# Patient Record
Sex: FEMALE | Race: BLACK OR AFRICAN AMERICAN | NOT HISPANIC OR LATINO | URBAN - METROPOLITAN AREA
[De-identification: names, ages, dates, MRNs, and addresses within clinical notes are randomized per-mention and may not be internally consistent; named-entity substitution may affect disease eponyms.]

---

## 2021-09-14 ENCOUNTER — EMERGENCY (EMERGENCY)
Facility: HOSPITAL | Age: 26
LOS: 0 days | Discharge: HOME | End: 2021-09-14
Attending: STUDENT IN AN ORGANIZED HEALTH CARE EDUCATION/TRAINING PROGRAM | Admitting: STUDENT IN AN ORGANIZED HEALTH CARE EDUCATION/TRAINING PROGRAM
Payer: MEDICAID

## 2021-09-14 VITALS
OXYGEN SATURATION: 97 % | WEIGHT: 250 LBS | RESPIRATION RATE: 20 BRPM | SYSTOLIC BLOOD PRESSURE: 177 MMHG | TEMPERATURE: 98 F | DIASTOLIC BLOOD PRESSURE: 86 MMHG | HEART RATE: 64 BPM

## 2021-09-14 DIAGNOSIS — L98.9 DISORDER OF THE SKIN AND SUBCUTANEOUS TISSUE, UNSPECIFIED: ICD-10-CM

## 2021-09-14 DIAGNOSIS — Z72.89 OTHER PROBLEMS RELATED TO LIFESTYLE: ICD-10-CM

## 2021-09-14 DIAGNOSIS — L91.0 HYPERTROPHIC SCAR: ICD-10-CM

## 2021-09-14 PROCEDURE — 99284 EMERGENCY DEPT VISIT MOD MDM: CPT | Mod: 25

## 2021-09-14 PROCEDURE — 76882 US LMTD JT/FCL EVL NVASC XTR: CPT | Mod: 26

## 2021-09-14 NOTE — ED PROCEDURE NOTE - ATTENDING CONTRIBUTION TO CARE
I personally supervised the study. I reviewed the images and interpretation by the resident/ACP and have edited where appropriate.

## 2021-09-14 NOTE — ED PROVIDER NOTE - PATIENT PORTAL LINK FT
You can access the FollowMyHealth Patient Portal offered by [facility] by registering at the following website: [email or web address]. By joining [employer]’s FollowMyHealth portal, you will also be able to view your health information using other applications (apps) compatible with our system.

## 2021-09-14 NOTE — ED PROVIDER NOTE - ATTENDING CONTRIBUTION TO CARE
27 yo f, no pmh presents for L shin wound. pt scraped her knee 3 months ago on wooden picnic table. abrasion healed w/o infection. since then, pt developed raised lesion, slightly darker. no fluctuance/ tenderness/ discharge    vss  gen- NAD, aaox3  card-rrr  lungs-ctab, no wheezing or rhonchi  neuro- full str/sensation, cn ii-xii grossly intact, normal coordination and gait  L shin - 1inch round raised lesion, no fluctance/erythema/drainage    keloid, will ref to derm

## 2021-09-14 NOTE — ED PROVIDER NOTE - OBJECTIVE STATEMENT
The patient is a 26 year old -American female with no stated PMHx presenting with a chief complaint of left leg wound. About 3 months ago the patient scraped her left shin against a wooden picnic table and had an abrasion. The abrasion healed without ever developing pus or discharge, per patient, but there remain a small lesion to the area. The lesion has been non purulent and is not painful. No recent F/C. No similar wounds in the past.     SHx: non smoker. Occasional EtOH. No illict drug us.

## 2021-09-14 NOTE — ED PROVIDER NOTE - NSFOLLOWUPINSTRUCTIONS_ED_ALL_ED_FT
Rash    A rash is a change in the color of the skin. A rash can also change the way your skin feels. There are many different conditions and factors that can cause a rash, most of which are not dangerous. Make sure to follow up with your primary care physician or a dermatologist as instructed by your health care provider. You most likely have a keloid, which is a collection of scar tissue where you previously injured yourself.     SEEK IMMEDIATE MEDICAL CARE IF YOU HAVE ANY OF THE FOLLOWING SYMPTOMS: fever, blisters, a rash inside your mouth, vaginal or anal pain, or altered mental status.

## 2021-09-14 NOTE — ED PROVIDER NOTE - NS ED ROS FT
Constitutional:  No fever, chills, lethargy, or abnormal weight loss  Eyes:  No eye pain or visual changes  ENMT: No nasal discharge, no sore throat. No neck pain or stiffness  Cardiac:  No chest pain or palpitations  Respiratory:  No cough or respiratory distress  GI:  No nausea, vomiting, diarrhea or abdominal pain  :  No dysuria, frequency, or hematuria  MS:  No back or joint pain  Neuro:  No headache. No numbness, weakness, or tingling  Skin:  +per hpi  Except as documented in the HPI,  all other systems are negative

## 2021-09-14 NOTE — ED PROVIDER NOTE - CARE PROVIDER_API CALL
Jaziel Dash  DERMATOLOGY  [address] [place] [address], [address] [place] [address]  Phone: [phone]  Fax: [phone]  Follow Up Time: 1-3 Days

## 2021-09-14 NOTE — ED PROVIDER NOTE - PHYSICAL EXAMINATION
VITAL SIGNS: I have reviewed nursing notes and confirm.  CONSTITUTIONAL: Well-appearing, non-toxic, in NAD  SKIN: Warm dry, normal skin turgor. There is a 0.75cm raised dark keloid lesion to the left anterior shin that is NTTP without underlying flutuance or drainage.   HEAD: NCAT  EYES: No conjunctival injection, scleral anicteric  ENT: Moist mucous membranes, normal pharynx with no erythema or exudates  NECK: Supple; non tender. Full ROM. No cervical LAD  CARD: RRR, no murmurs, rubs or gallops  RESP: Clear to ausculation bilaterally.  No rales, rhonchi, or wheezing.  ABD: Soft, non-distended, non-tender, no rebound or guarding. No CVA tenderness  EXT: Full ROM, no bony tenderness, no pedal edema, no calf tenderness  NEURO: Normal motor, normal sensory. CN II-XII grossly intact. Cerebellar testing normal. Normal gait.  PSYCH: Cooperative, appropriate.

## 2022-02-17 ENCOUNTER — NON-APPOINTMENT (OUTPATIENT)
Age: 27
End: 2022-02-17

## 2022-02-17 ENCOUNTER — APPOINTMENT (OUTPATIENT)
Dept: OBGYN | Facility: CLINIC | Age: 27
End: 2022-02-17
Payer: MEDICAID

## 2022-02-17 PROCEDURE — 99214 OFFICE O/P EST MOD 30 MIN: CPT

## 2022-02-17 PROCEDURE — 76830 TRANSVAGINAL US NON-OB: CPT

## 2022-02-17 PROCEDURE — 87490 CHLMYD TRACH DNA DIR PROBE: CPT

## 2022-02-18 ENCOUNTER — LABORATORY RESULT (OUTPATIENT)
Age: 27
End: 2022-02-18

## 2022-03-03 ENCOUNTER — APPOINTMENT (OUTPATIENT)
Dept: OBGYN | Facility: CLINIC | Age: 27
End: 2022-03-03
Payer: MEDICAID

## 2022-03-03 PROCEDURE — 76817 TRANSVAGINAL US OBSTETRIC: CPT

## 2022-03-03 PROCEDURE — 0502F SUBSEQUENT PRENATAL CARE: CPT

## 2022-03-15 ENCOUNTER — APPOINTMENT (OUTPATIENT)
Dept: OBGYN | Facility: CLINIC | Age: 27
End: 2022-03-15
Payer: MEDICAID

## 2022-03-15 PROCEDURE — 99213 OFFICE O/P EST LOW 20 MIN: CPT

## 2022-03-18 NOTE — ED ADULT TRIAGE NOTE - TEMPERATURE IN CELSIUS (DEGREES C)
Patient is on phone very short of breath, does not feel water pill is helping. Was seen 2 weeks ago for bp   36.9

## 2022-03-26 ENCOUNTER — EMERGENCY (EMERGENCY)
Facility: HOSPITAL | Age: 27
LOS: 0 days | Discharge: HOME | End: 2022-03-26
Attending: EMERGENCY MEDICINE | Admitting: EMERGENCY MEDICINE
Payer: MEDICAID

## 2022-03-26 VITALS
DIASTOLIC BLOOD PRESSURE: 83 MMHG | RESPIRATION RATE: 16 BRPM | HEART RATE: 66 BPM | SYSTOLIC BLOOD PRESSURE: 124 MMHG | WEIGHT: 237 LBS | OXYGEN SATURATION: 99 % | TEMPERATURE: 97 F

## 2022-03-26 DIAGNOSIS — F17.200 NICOTINE DEPENDENCE, UNSPECIFIED, UNCOMPLICATED: ICD-10-CM

## 2022-03-26 DIAGNOSIS — O26.891 OTHER SPECIFIED PREGNANCY RELATED CONDITIONS, FIRST TRIMESTER: ICD-10-CM

## 2022-03-26 DIAGNOSIS — O23.41 UNSPECIFIED INFECTION OF URINARY TRACT IN PREGNANCY, FIRST TRIMESTER: ICD-10-CM

## 2022-03-26 DIAGNOSIS — F12.90 CANNABIS USE, UNSPECIFIED, UNCOMPLICATED: ICD-10-CM

## 2022-03-26 DIAGNOSIS — O99.331 SMOKING (TOBACCO) COMPLICATING PREGNANCY, FIRST TRIMESTER: ICD-10-CM

## 2022-03-26 DIAGNOSIS — O21.1 HYPEREMESIS GRAVIDARUM WITH METABOLIC DISTURBANCE: ICD-10-CM

## 2022-03-26 DIAGNOSIS — R10.13 EPIGASTRIC PAIN: ICD-10-CM

## 2022-03-26 DIAGNOSIS — Z3A.10 10 WEEKS GESTATION OF PREGNANCY: ICD-10-CM

## 2022-03-26 DIAGNOSIS — O21.9 VOMITING OF PREGNANCY, UNSPECIFIED: ICD-10-CM

## 2022-03-26 LAB
ALBUMIN SERPL ELPH-MCNC: 4.5 G/DL — SIGNIFICANT CHANGE UP (ref 3.5–5.2)
ALP SERPL-CCNC: 67 U/L — SIGNIFICANT CHANGE UP (ref 30–115)
ALT FLD-CCNC: 18 U/L — SIGNIFICANT CHANGE UP (ref 0–41)
ANION GAP SERPL CALC-SCNC: 19 MMOL/L — HIGH (ref 7–14)
APPEARANCE UR: ABNORMAL
APTT BLD: 29.1 SEC — SIGNIFICANT CHANGE UP (ref 27–39.2)
AST SERPL-CCNC: 17 U/L — SIGNIFICANT CHANGE UP (ref 0–41)
BACTERIA # UR AUTO: ABNORMAL
BASOPHILS # BLD AUTO: 0.02 K/UL — SIGNIFICANT CHANGE UP (ref 0–0.2)
BASOPHILS NFR BLD AUTO: 0.3 % — SIGNIFICANT CHANGE UP (ref 0–1)
BILIRUB SERPL-MCNC: 0.4 MG/DL — SIGNIFICANT CHANGE UP (ref 0.2–1.2)
BILIRUB UR-MCNC: ABNORMAL
BLD GP AB SCN SERPL QL: SIGNIFICANT CHANGE UP
BUN SERPL-MCNC: 6 MG/DL — LOW (ref 10–20)
CALCIUM SERPL-MCNC: 10.2 MG/DL — HIGH (ref 8.5–10.1)
CHLORIDE SERPL-SCNC: 99 MMOL/L — SIGNIFICANT CHANGE UP (ref 98–110)
CO2 SERPL-SCNC: 19 MMOL/L — SIGNIFICANT CHANGE UP (ref 17–32)
COLOR SPEC: YELLOW — SIGNIFICANT CHANGE UP
CREAT SERPL-MCNC: 0.6 MG/DL — LOW (ref 0.7–1.5)
DIFF PNL FLD: NEGATIVE — SIGNIFICANT CHANGE UP
EGFR: 126 ML/MIN/1.73M2 — SIGNIFICANT CHANGE UP
EOSINOPHIL # BLD AUTO: 0.03 K/UL — SIGNIFICANT CHANGE UP (ref 0–0.7)
EOSINOPHIL NFR BLD AUTO: 0.5 % — SIGNIFICANT CHANGE UP (ref 0–8)
EPI CELLS # UR: >27 /HPF — HIGH (ref 0–5)
GLUCOSE SERPL-MCNC: 89 MG/DL — SIGNIFICANT CHANGE UP (ref 70–99)
GLUCOSE UR QL: SIGNIFICANT CHANGE UP
HCG SERPL-ACNC: HIGH MIU/ML
HCT VFR BLD CALC: 39.1 % — SIGNIFICANT CHANGE UP (ref 37–47)
HGB BLD-MCNC: 13.9 G/DL — SIGNIFICANT CHANGE UP (ref 12–16)
HYALINE CASTS # UR AUTO: 14 /LPF — HIGH (ref 0–7)
IMM GRANULOCYTES NFR BLD AUTO: 0.2 % — SIGNIFICANT CHANGE UP (ref 0.1–0.3)
INR BLD: 1.22 RATIO — SIGNIFICANT CHANGE UP (ref 0.65–1.3)
KETONES UR-MCNC: ABNORMAL
LEUKOCYTE ESTERASE UR-ACNC: NEGATIVE — SIGNIFICANT CHANGE UP
LIDOCAIN IGE QN: 27 U/L — SIGNIFICANT CHANGE UP (ref 7–60)
LYMPHOCYTES # BLD AUTO: 1.7 K/UL — SIGNIFICANT CHANGE UP (ref 1.2–3.4)
LYMPHOCYTES # BLD AUTO: 29.2 % — SIGNIFICANT CHANGE UP (ref 20.5–51.1)
MCHC RBC-ENTMCNC: 29 PG — SIGNIFICANT CHANGE UP (ref 27–31)
MCHC RBC-ENTMCNC: 35.5 G/DL — SIGNIFICANT CHANGE UP (ref 32–37)
MCV RBC AUTO: 81.6 FL — SIGNIFICANT CHANGE UP (ref 81–99)
MONOCYTES # BLD AUTO: 0.51 K/UL — SIGNIFICANT CHANGE UP (ref 0.1–0.6)
MONOCYTES NFR BLD AUTO: 8.7 % — SIGNIFICANT CHANGE UP (ref 1.7–9.3)
NEUTROPHILS # BLD AUTO: 3.56 K/UL — SIGNIFICANT CHANGE UP (ref 1.4–6.5)
NEUTROPHILS NFR BLD AUTO: 61.1 % — SIGNIFICANT CHANGE UP (ref 42.2–75.2)
NITRITE UR-MCNC: NEGATIVE — SIGNIFICANT CHANGE UP
NRBC # BLD: 0 /100 WBCS — SIGNIFICANT CHANGE UP (ref 0–0)
PH UR: 6 — SIGNIFICANT CHANGE UP (ref 5–8)
PLATELET # BLD AUTO: 248 K/UL — SIGNIFICANT CHANGE UP (ref 130–400)
POTASSIUM SERPL-MCNC: 3.7 MMOL/L — SIGNIFICANT CHANGE UP (ref 3.5–5)
POTASSIUM SERPL-SCNC: 3.7 MMOL/L — SIGNIFICANT CHANGE UP (ref 3.5–5)
PROT SERPL-MCNC: 7.5 G/DL — SIGNIFICANT CHANGE UP (ref 6–8)
PROT UR-MCNC: ABNORMAL
PROTHROM AB SERPL-ACNC: 14 SEC — HIGH (ref 9.95–12.87)
RBC # BLD: 4.79 M/UL — SIGNIFICANT CHANGE UP (ref 4.2–5.4)
RBC # FLD: 11.9 % — SIGNIFICANT CHANGE UP (ref 11.5–14.5)
RBC CASTS # UR COMP ASSIST: 0 /HPF — SIGNIFICANT CHANGE UP (ref 0–4)
SODIUM SERPL-SCNC: 137 MMOL/L — SIGNIFICANT CHANGE UP (ref 135–146)
SP GR SPEC: 1.04 — HIGH (ref 1.01–1.03)
UROBILINOGEN FLD QL: ABNORMAL
WBC # BLD: 5.83 K/UL — SIGNIFICANT CHANGE UP (ref 4.8–10.8)
WBC # FLD AUTO: 5.83 K/UL — SIGNIFICANT CHANGE UP (ref 4.8–10.8)
WBC UR QL: 9 /HPF — HIGH (ref 0–5)

## 2022-03-26 PROCEDURE — 76815 OB US LIMITED FETUS(S): CPT | Mod: 26

## 2022-03-26 PROCEDURE — 76856 US EXAM PELVIC COMPLETE: CPT | Mod: 26

## 2022-03-26 PROCEDURE — 99285 EMERGENCY DEPT VISIT HI MDM: CPT

## 2022-03-26 RX ORDER — NITROFURANTOIN MACROCRYSTAL 50 MG
1 CAPSULE ORAL
Qty: 14 | Refills: 0
Start: 2022-03-26 | End: 2022-04-01

## 2022-03-26 RX ORDER — SODIUM CHLORIDE 9 MG/ML
1000 INJECTION INTRAMUSCULAR; INTRAVENOUS; SUBCUTANEOUS ONCE
Refills: 0 | Status: COMPLETED | OUTPATIENT
Start: 2022-03-26 | End: 2022-03-26

## 2022-03-26 RX ORDER — FAMOTIDINE 10 MG/ML
20 INJECTION INTRAVENOUS DAILY
Refills: 0 | Status: DISCONTINUED | OUTPATIENT
Start: 2022-03-26 | End: 2022-03-26

## 2022-03-26 RX ORDER — ONDANSETRON 8 MG/1
4 TABLET, FILM COATED ORAL ONCE
Refills: 0 | Status: COMPLETED | OUTPATIENT
Start: 2022-03-26 | End: 2022-03-26

## 2022-03-26 RX ADMIN — ONDANSETRON 4 MILLIGRAM(S): 8 TABLET, FILM COATED ORAL at 08:28

## 2022-03-26 RX ADMIN — FAMOTIDINE 104 MILLIGRAM(S): 10 INJECTION INTRAVENOUS at 08:28

## 2022-03-26 RX ADMIN — SODIUM CHLORIDE 1000 MILLILITER(S): 9 INJECTION INTRAMUSCULAR; INTRAVENOUS; SUBCUTANEOUS at 08:28

## 2022-03-26 NOTE — ED PROVIDER NOTE - PATIENT PORTAL LINK FT
You can access the FollowMyHealth Patient Portal offered by [facility] by registering at the following website: [email or web address]. By joining [employer]’s FollowMyHealth portal, you will also be able to view your health information using other applications (apps) compatible with our system.

## 2022-03-26 NOTE — ED PROVIDER NOTE - CLINICAL SUMMARY MEDICAL DECISION MAKING FREE TEXT BOX
pt tolerating po water and jello in ED, feels much better, dc home w macrobid for uti, already has script for antiemetics from her OB, f/u OB 1-2 weeks, strict return precautions provided

## 2022-03-26 NOTE — ED PROVIDER NOTE - PROGRESS NOTE DETAILS
Patient tolerated PO. She will fill rx for antiemetic that was prescribed for her by her OB. Will treat with Macrobid for UTI

## 2022-03-26 NOTE — ED PROVIDER NOTE - NS ED ROS FT
Review of Systems    Constitutional: (-) fever, (-) chills  Eyes/ENT: (-) blurry vision, (-) epistaxis, (-) sore throat  Cardiovascular: (-) chest pain, (-) syncope  Respiratory: (-) cough, (-) shortness of breath  Gastrointestinal: (+)epigastric burning, (+) nausea, (+) vomiting, (-) diarrhea  Musculoskeletal: (-) neck pain, (-) back pain, (-) body aches  Integumentary: (-) rash, (-) edema  Neurological: (-) headache, (-) altered mental status  Psychiatric: (-) hallucinations  Allergic/Immunologic: (-) pruritus

## 2022-03-26 NOTE — ED PROVIDER NOTE - NS ED ATTENDING STATEMENT MOD
This was a shared visit with the CHRIS. I reviewed and verified the documentation and independently performed the documented:

## 2022-03-26 NOTE — ED PROVIDER NOTE - OBJECTIVE STATEMENT
27 yo F , LMP unknown, 10 weeks pregnant c/o increased vomiting x 3days. + epigastric burning.  No abdominal pains. No f/c/c/d. No vaginal bleeding. No urinary complaints. 27 yo F , LMP unknown, 10 weeks pregnant c/o increased vomiting x 3days. + epigastric burning.  No abdominal pains. No f/c/c/d. No vaginal bleeding. No urinary complaints. +uses Marijuana

## 2022-03-26 NOTE — ED PROVIDER NOTE - ATTENDING CONTRIBUTION TO CARE
26-year-old female no past medical history/past surgical history, G3, P0, approximately 10 weeks pregnant, presents with 3 days of intractable nonbilious nonbloody emesis.  Loose stools associated.  Epigastric burning that radiates to her chest and throat associated.  No fever.  No dysuria frequency hematuria.  No vaginal bleeding or discharge.  States she has had an ultrasound with this pregnancy and it was normal.  OB/GYN Dr. Lee, has appointment in 1 week.  Patient's mother at the bedside.    On exam, AFVSS, Well appearing, No acute distress, NCAT, EOMI, PERRLA, dry MM, Neck supple, LCTAB, RRR nl s1s2 No mrg, Abdomen Soft NTND, AAOx3, No Focal Deficits, No LE edema or calf TTP, actively vomiting in the ED    A/P; hyperemesis gravidarum–mild dehydration–we will do labs UA sono IV fluid Pepcid Zofran and reassess p.o. trial

## 2022-03-28 LAB
CULTURE RESULTS: SIGNIFICANT CHANGE UP
SPECIMEN SOURCE: SIGNIFICANT CHANGE UP

## 2022-04-05 ENCOUNTER — APPOINTMENT (OUTPATIENT)
Dept: OBGYN | Facility: CLINIC | Age: 27
End: 2022-04-05
Payer: MEDICAID

## 2022-04-05 PROCEDURE — 99213 OFFICE O/P EST LOW 20 MIN: CPT

## 2022-04-07 ENCOUNTER — OUTPATIENT (OUTPATIENT)
Dept: OUTPATIENT SERVICES | Facility: HOSPITAL | Age: 27
LOS: 1 days | Discharge: HOME | End: 2022-04-07

## 2022-04-07 ENCOUNTER — APPOINTMENT (OUTPATIENT)
Dept: ANTEPARTUM | Facility: CLINIC | Age: 27
End: 2022-04-07
Payer: MEDICAID

## 2022-04-07 ENCOUNTER — ASOB RESULT (OUTPATIENT)
Age: 27
End: 2022-04-07

## 2022-04-07 PROCEDURE — 76813 OB US NUCHAL MEAS 1 GEST: CPT | Mod: 26

## 2022-04-12 ENCOUNTER — APPOINTMENT (OUTPATIENT)
Dept: OBGYN | Facility: CLINIC | Age: 27
End: 2022-04-12
Payer: MEDICAID

## 2022-04-12 PROCEDURE — 99213 OFFICE O/P EST LOW 20 MIN: CPT

## 2022-05-05 ENCOUNTER — APPOINTMENT (OUTPATIENT)
Dept: OBGYN | Facility: CLINIC | Age: 27
End: 2022-05-05
Payer: MEDICAID

## 2022-05-05 PROCEDURE — 99213 OFFICE O/P EST LOW 20 MIN: CPT

## 2022-05-05 PROCEDURE — 76815 OB US LIMITED FETUS(S): CPT

## 2022-06-01 ENCOUNTER — APPOINTMENT (OUTPATIENT)
Dept: ANTEPARTUM | Facility: CLINIC | Age: 27
End: 2022-06-01
Payer: MEDICAID

## 2022-06-01 ENCOUNTER — ASOB RESULT (OUTPATIENT)
Age: 27
End: 2022-06-01

## 2022-06-01 ENCOUNTER — OUTPATIENT (OUTPATIENT)
Dept: OUTPATIENT SERVICES | Facility: HOSPITAL | Age: 27
LOS: 1 days | Discharge: HOME | End: 2022-06-01

## 2022-06-01 PROCEDURE — 76817 TRANSVAGINAL US OBSTETRIC: CPT | Mod: 26

## 2022-06-01 PROCEDURE — 76811 OB US DETAILED SNGL FETUS: CPT | Mod: 26

## 2022-06-06 DIAGNOSIS — Z36.86 ENCOUNTER FOR ANTENATAL SCREENING FOR CERVICAL LENGTH: ICD-10-CM

## 2022-06-06 DIAGNOSIS — O99.210 OBESITY COMPLICATING PREGNANCY, UNSPECIFIED TRIMESTER: ICD-10-CM

## 2022-06-06 DIAGNOSIS — Z36.3 ENCOUNTER FOR ANTENATAL SCREENING FOR MALFORMATIONS: ICD-10-CM

## 2022-06-07 ENCOUNTER — APPOINTMENT (OUTPATIENT)
Dept: OBGYN | Facility: CLINIC | Age: 27
End: 2022-06-07
Payer: MEDICAID

## 2022-06-07 PROCEDURE — 99213 OFFICE O/P EST LOW 20 MIN: CPT

## 2022-06-22 ENCOUNTER — ASOB RESULT (OUTPATIENT)
Age: 27
End: 2022-06-22

## 2022-06-22 ENCOUNTER — OUTPATIENT (OUTPATIENT)
Dept: OUTPATIENT SERVICES | Facility: HOSPITAL | Age: 27
LOS: 1 days | Discharge: HOME | End: 2022-06-22

## 2022-06-22 ENCOUNTER — APPOINTMENT (OUTPATIENT)
Dept: ANTEPARTUM | Facility: CLINIC | Age: 27
End: 2022-06-22
Payer: MEDICAID

## 2022-06-22 DIAGNOSIS — O99.210 OBESITY COMPLICATING PREGNANCY, UNSPECIFIED TRIMESTER: ICD-10-CM

## 2022-06-22 DIAGNOSIS — Z36.3 ENCOUNTER FOR ANTENATAL SCREENING FOR MALFORMATIONS: ICD-10-CM

## 2022-06-22 DIAGNOSIS — Z3A.23 23 WEEKS GESTATION OF PREGNANCY: ICD-10-CM

## 2022-06-22 PROCEDURE — 76815 OB US LIMITED FETUS(S): CPT | Mod: 26

## 2022-06-28 ENCOUNTER — APPOINTMENT (OUTPATIENT)
Dept: OBGYN | Facility: CLINIC | Age: 27
End: 2022-06-28

## 2022-06-28 PROCEDURE — 76811 OB US DETAILED SNGL FETUS: CPT

## 2022-06-28 PROCEDURE — 99213 OFFICE O/P EST LOW 20 MIN: CPT

## 2022-07-14 ENCOUNTER — APPOINTMENT (OUTPATIENT)
Dept: OBGYN | Facility: CLINIC | Age: 27
End: 2022-07-14

## 2022-07-21 ENCOUNTER — NON-APPOINTMENT (OUTPATIENT)
Age: 27
End: 2022-07-21

## 2022-07-21 DIAGNOSIS — Z3A.27 27 WEEKS GESTATION OF PREGNANCY: ICD-10-CM

## 2022-07-28 ENCOUNTER — APPOINTMENT (OUTPATIENT)
Dept: OBGYN | Facility: CLINIC | Age: 27
End: 2022-07-28

## 2022-07-28 PROCEDURE — 99213 OFFICE O/P EST LOW 20 MIN: CPT

## 2022-08-16 ENCOUNTER — APPOINTMENT (OUTPATIENT)
Dept: OBGYN | Facility: CLINIC | Age: 27
End: 2022-08-16

## 2022-08-16 PROCEDURE — 99213 OFFICE O/P EST LOW 20 MIN: CPT

## 2022-08-22 ENCOUNTER — OUTPATIENT (OUTPATIENT)
Dept: EMERGENCY DEPT | Facility: HOSPITAL | Age: 27
LOS: 1 days | Discharge: HOME | End: 2022-08-22

## 2022-08-22 VITALS
SYSTOLIC BLOOD PRESSURE: 114 MMHG | WEIGHT: 235.89 LBS | OXYGEN SATURATION: 99 % | DIASTOLIC BLOOD PRESSURE: 85 MMHG | TEMPERATURE: 99 F | HEART RATE: 107 BPM | RESPIRATION RATE: 18 BRPM

## 2022-08-22 PROCEDURE — 99284 EMERGENCY DEPT VISIT MOD MDM: CPT

## 2022-08-22 RX ORDER — ACETAMINOPHEN 500 MG
650 TABLET ORAL ONCE
Refills: 0 | Status: COMPLETED | OUTPATIENT
Start: 2022-08-22 | End: 2022-08-22

## 2022-08-22 RX ORDER — GLYCERIN ADULT
1 SUPPOSITORY, RECTAL RECTAL ONCE
Refills: 0 | Status: COMPLETED | OUTPATIENT
Start: 2022-08-22 | End: 2022-08-22

## 2022-08-22 RX ADMIN — Medication 1 SUPPOSITORY(S): at 23:51

## 2022-08-22 RX ADMIN — Medication 650 MILLIGRAM(S): at 23:19

## 2022-08-22 NOTE — ED PROVIDER NOTE - TOBACCO USE
was treated with adjuvant radiation therapy and adjuvant endocrine therapy with Anastrozole which she initiated in January 2020. I reassured her that based on her clinical examination and most recent breast imaging that there is no evidence of any recurrent disease or new abnormalities. We reviewed the importance of continued endocrine therapy. Signs and symptoms of recurrence were reviewed. She verbalizes understanding that she should notify our office if she identifies any abnormalities on self examination as it may require further workup. I would like to see her back in 3 months for a clinical exam and bilateral mammograms. In the interim, I encouraged her to resume self examinations on a monthly basis and to alert me of any changes. I also encouraged her to eat healthy, stay active, and limit alcohol intake for risk reduction purposes. I answered all of her questions thoroughly, and she does seem pleased with this plan of approach. I encouraged her to contact me in the interim if any new questions or concerns arise.     Summary:  - f/u in 3 months with bilateral mammograms  - continue endocrine therapy per medical oncology recommendations    Moe Singh MD Former smoker

## 2022-08-22 NOTE — ED PROVIDER NOTE - ATTENDING CONTRIBUTION TO CARE
28 yo f , ~7.5 months pregnant, OBGYN- Dr. Lee  pt presents for eval of constipation/bloating. last bm 4 days ago. pt still passing gas.  no previous abd surgeries.    vss  gen- NAD, aaox3  card-rrr  lungs-ctab, no wheezing or rhonchi  abd-sntnd, no guarding or rebound  neuro- full str/sensation, cn ii-xii grossly intact, normal coordination and gait    will give suppository, FHR

## 2022-08-22 NOTE — ED ADULT TRIAGE NOTE - CHIEF COMPLAINT QUOTE
Pt c/o anal pain. Pt states she is constipated x 4 days and is having a lot of rectal pain. Pt 7.5 months pregnant

## 2022-08-22 NOTE — ED PROVIDER NOTE - NS ED ROS FT
Review of Systems    Constitutional: (-) fever  Cardiovascular: (-) chest pain, (-) syncope  Respiratory: (-) cough, (-) shortness of breath  Gastrointestinal: (-) vomiting, (-) diarrhea, (+) abdominal pain (+) constipation  Musculoskeletal: (-) neck pain, (-) back pain, (-) joint pain  Integumentary: (-) rash, (-) edema  Neurological: (-) headache, (-) altered mental status    Except as documented in the HPI, all other systems are negative.

## 2022-08-22 NOTE — ED ADULT NURSE NOTE - OBJECTIVE STATEMENT
Pt c/o anal pain. Pt states she is constipated x 4 days and is having a lot of rectal pain. Pt 7.5 months pregnant AOx4 Fall Risk

## 2022-08-22 NOTE — ED PROVIDER NOTE - PHYSICAL EXAMINATION
Vital Signs: I have reviewed the initial vital signs.  Constitutional: well-nourished, appears stated age, no acute distress.  HEENT: Airway patent, moist MM, EOMI, PERRLA.  CV: regular rate, regular rhythm, well-perfused extremities,   Lungs: Clear to ascultation bilaterally, no increased work of breathing.  ABD: gravid uterus, non-tender  MSK: Neck supple, nontender, normal range of motion,  INTEG: Skin warm, dry, no rash.  NEURO: A&Ox3, moving all extremities, normal speech  PSYCH: Calm, cooperative, normal affect and interaction.

## 2022-08-22 NOTE — ED PROVIDER NOTE - OBJECTIVE STATEMENT
Patient is a 27y F 7.5 months pregnant Patient of Dr. Lee presenting for abdominal pain and constipation. Last BM was 4 days ago. Patient was started on stool softeners by OB (colase) but hasn't been helping. Still passing gas.

## 2022-08-23 VITALS — DIASTOLIC BLOOD PRESSURE: 84 MMHG | SYSTOLIC BLOOD PRESSURE: 135 MMHG | HEART RATE: 71 BPM

## 2022-08-23 DIAGNOSIS — O26.893 OTHER SPECIFIED PREGNANCY RELATED CONDITIONS, THIRD TRIMESTER: ICD-10-CM

## 2022-08-23 DIAGNOSIS — K59.00 CONSTIPATION, UNSPECIFIED: ICD-10-CM

## 2022-08-23 RX ADMIN — Medication 10 MILLIGRAM(S): at 01:23

## 2022-08-23 NOTE — OB PROVIDER TRIAGE NOTE - HISTORY OF PRESENT ILLNESS
26 y/o  at 32w0d, EDD1, dated by LMP c/w first trimester sonogram, presents for constipation. Reports taking stool softener, last bowel movement was 4 days ago. Reports intermittent rectal pressure. Reports vomiting since the beginning of pregnancy. Denies nausea. Reports passing flatus. Denies abdominal pain, LOF, vaginal bleeding, dyschezia, hematuria or dysuria. Reports good fetal movement

## 2022-08-23 NOTE — OB PROVIDER TRIAGE NOTE - NSHPPHYSICALEXAM_GEN_ALL_CORE
Vital Signs Last 24 Hrs  T(C): 37.1 (23 Aug 2022 00:41), Max: 37.1 (23 Aug 2022 00:41)  T(F): 98.7 (23 Aug 2022 00:41), Max: 98.7 (23 Aug 2022 00:41)  HR: 71 (23 Aug 2022 00:45) (71 - 107)  BP: 135/84 (23 Aug 2022 00:45) (114/85 - 135/84)  RR: 18 (23 Aug 2022 00:41) (18 - 18)  SpO2: 99% (22 Aug 2022 22:03) (99% - 99%)    Parameters below as of 22 Aug 2022 22:03  Patient On (Oxygen Delivery Method): room air    GA: AAOx3 in NAD  abd: gravid, nontender, no palpable contractions  EFM: 155/mod variability/accels +  toco: no contractions  SVE: deferred  BSS: vertex, MVP 3cm, ant placenta, BPP 8/8

## 2022-08-23 NOTE — OB PROVIDER TRIAGE NOTE - NS_OBGYNHISTORY_OBGYN_ALL_OB_FT
OB: eTOPx2 with D&Cx2    GYN:   h/o trichomonas and chlamydia earlier in this pregnancy, s/p treatment, neg CASTILLO per patient  denies h/o fibroids, cysts, abnormal paps, or STI's

## 2022-08-23 NOTE — OB RN TRIAGE NOTE - FALL HARM RISK - UNIVERSAL INTERVENTIONS
Bed in lowest position, wheels locked, appropriate side rails in place/Call bell, personal items and telephone in reach/Instruct patient to call for assistance before getting out of bed or chair/Non-slip footwear when patient is out of bed/[place] to call system/Physically safe environment - no spills, clutter or unnecessary equipment/Purposeful Proactive Rounding/Room/bathroom lighting operational, light cord in reach

## 2022-08-23 NOTE — OB PROVIDER TRIAGE NOTE - NSOBPROVIDERNOTE_OBGYN_ALL_OB_FT
26 y/o  at 32w0d, with rectal pressure, likely secondary to constipation, s/p glycerin suppository, BPP 10/10 with reassuring maternal and fetal status  - discharge with PTL precautions  - advised to increased fiber and water intake in diet  - dulcolax suppository sent to pharmacy  - encourage to f/u with PMD as scheduled     Dr. Lee aware

## 2022-09-06 ENCOUNTER — APPOINTMENT (OUTPATIENT)
Dept: ANTEPARTUM | Facility: CLINIC | Age: 27
End: 2022-09-06

## 2022-09-06 ENCOUNTER — APPOINTMENT (OUTPATIENT)
Dept: OBGYN | Facility: CLINIC | Age: 27
End: 2022-09-06

## 2022-09-06 VITALS
DIASTOLIC BLOOD PRESSURE: 74 MMHG | HEIGHT: 62 IN | SYSTOLIC BLOOD PRESSURE: 120 MMHG | BODY MASS INDEX: 46.01 KG/M2 | WEIGHT: 250 LBS

## 2022-09-06 PROCEDURE — 76819 FETAL BIOPHYS PROFIL W/O NST: CPT

## 2022-09-06 PROCEDURE — 99213 OFFICE O/P EST LOW 20 MIN: CPT

## 2022-09-06 PROCEDURE — 76815 OB US LIMITED FETUS(S): CPT

## 2022-09-13 ENCOUNTER — APPOINTMENT (OUTPATIENT)
Dept: ANTEPARTUM | Facility: CLINIC | Age: 27
End: 2022-09-13

## 2022-09-13 ENCOUNTER — APPOINTMENT (OUTPATIENT)
Dept: OBGYN | Facility: CLINIC | Age: 27
End: 2022-09-13

## 2022-09-13 VITALS
WEIGHT: 260 LBS | DIASTOLIC BLOOD PRESSURE: 85 MMHG | HEIGHT: 62 IN | SYSTOLIC BLOOD PRESSURE: 120 MMHG | BODY MASS INDEX: 47.84 KG/M2

## 2022-09-13 PROCEDURE — 99213 OFFICE O/P EST LOW 20 MIN: CPT

## 2022-09-16 ENCOUNTER — NON-APPOINTMENT (OUTPATIENT)
Age: 27
End: 2022-09-16

## 2022-09-20 ENCOUNTER — APPOINTMENT (OUTPATIENT)
Dept: OBGYN | Facility: CLINIC | Age: 27
End: 2022-09-20

## 2022-09-20 ENCOUNTER — APPOINTMENT (OUTPATIENT)
Dept: ANTEPARTUM | Facility: CLINIC | Age: 27
End: 2022-09-20

## 2022-09-28 ENCOUNTER — APPOINTMENT (OUTPATIENT)
Dept: ANTEPARTUM | Facility: CLINIC | Age: 27
End: 2022-09-28

## 2022-10-03 ENCOUNTER — RESULT REVIEW (OUTPATIENT)
Age: 27
End: 2022-10-03

## 2022-10-03 ENCOUNTER — TRANSCRIPTION ENCOUNTER (OUTPATIENT)
Age: 27
End: 2022-10-03

## 2022-10-03 ENCOUNTER — INPATIENT (INPATIENT)
Facility: HOSPITAL | Age: 27
LOS: 4 days | Discharge: HOME | End: 2022-10-08
Attending: OBSTETRICS & GYNECOLOGY | Admitting: OBSTETRICS & GYNECOLOGY

## 2022-10-03 ENCOUNTER — APPOINTMENT (OUTPATIENT)
Dept: OBGYN | Facility: CLINIC | Age: 27
End: 2022-10-03

## 2022-10-03 VITALS — TEMPERATURE: 99 F

## 2022-10-03 DIAGNOSIS — Z98.890 OTHER SPECIFIED POSTPROCEDURAL STATES: Chronic | ICD-10-CM

## 2022-10-03 LAB
ALBUMIN SERPL ELPH-MCNC: 2.5 G/DL — LOW (ref 3.5–5.2)
ALBUMIN SERPL ELPH-MCNC: 2.9 G/DL — LOW (ref 3.5–5.2)
ALBUMIN SERPL ELPH-MCNC: 3 G/DL — LOW (ref 3.5–5.2)
ALBUMIN SERPL ELPH-MCNC: 3 G/DL — LOW (ref 3.5–5.2)
ALP SERPL-CCNC: 232 U/L — HIGH (ref 30–115)
ALP SERPL-CCNC: 294 U/L — HIGH (ref 30–115)
ALP SERPL-CCNC: 296 U/L — HIGH (ref 30–115)
ALP SERPL-CCNC: 303 U/L — HIGH (ref 30–115)
ALT FLD-CCNC: 10 U/L — SIGNIFICANT CHANGE UP (ref 0–41)
ALT FLD-CCNC: 11 U/L — SIGNIFICANT CHANGE UP (ref 0–41)
ALT FLD-CCNC: 11 U/L — SIGNIFICANT CHANGE UP (ref 0–41)
ALT FLD-CCNC: 8 U/L — SIGNIFICANT CHANGE UP (ref 0–41)
AMPHET UR-MCNC: NEGATIVE — SIGNIFICANT CHANGE UP
ANION GAP SERPL CALC-SCNC: 11 MMOL/L — SIGNIFICANT CHANGE UP (ref 7–14)
ANION GAP SERPL CALC-SCNC: 15 MMOL/L — HIGH (ref 7–14)
ANION GAP SERPL CALC-SCNC: 15 MMOL/L — HIGH (ref 7–14)
ANION GAP SERPL CALC-SCNC: 20 MMOL/L — HIGH (ref 7–14)
ANISOCYTOSIS BLD QL: SLIGHT — SIGNIFICANT CHANGE UP
APPEARANCE UR: ABNORMAL
APTT BLD: 24.6 SEC — LOW (ref 27–39.2)
AST SERPL-CCNC: 17 U/L — SIGNIFICANT CHANGE UP (ref 0–41)
AST SERPL-CCNC: 22 U/L — SIGNIFICANT CHANGE UP (ref 0–41)
AST SERPL-CCNC: 23 U/L — SIGNIFICANT CHANGE UP (ref 0–41)
AST SERPL-CCNC: 26 U/L — SIGNIFICANT CHANGE UP (ref 0–41)
BACTERIA # UR AUTO: ABNORMAL
BARBITURATES UR SCN-MCNC: NEGATIVE — SIGNIFICANT CHANGE UP
BASOPHILS # BLD AUTO: 0 K/UL — SIGNIFICANT CHANGE UP (ref 0–0.2)
BASOPHILS # BLD AUTO: 0.02 K/UL — SIGNIFICANT CHANGE UP (ref 0–0.2)
BASOPHILS # BLD AUTO: 0.02 K/UL — SIGNIFICANT CHANGE UP (ref 0–0.2)
BASOPHILS # BLD AUTO: 0.03 K/UL — SIGNIFICANT CHANGE UP (ref 0–0.2)
BASOPHILS NFR BLD AUTO: 0 % — SIGNIFICANT CHANGE UP (ref 0–1)
BASOPHILS NFR BLD AUTO: 0.2 % — SIGNIFICANT CHANGE UP (ref 0–1)
BASOPHILS NFR BLD AUTO: 0.2 % — SIGNIFICANT CHANGE UP (ref 0–1)
BASOPHILS NFR BLD AUTO: 0.3 % — SIGNIFICANT CHANGE UP (ref 0–1)
BENZODIAZ UR-MCNC: NEGATIVE — SIGNIFICANT CHANGE UP
BILIRUB SERPL-MCNC: 0.5 MG/DL — SIGNIFICANT CHANGE UP (ref 0.2–1.2)
BILIRUB SERPL-MCNC: 0.6 MG/DL — SIGNIFICANT CHANGE UP (ref 0.2–1.2)
BILIRUB SERPL-MCNC: 0.6 MG/DL — SIGNIFICANT CHANGE UP (ref 0.2–1.2)
BILIRUB SERPL-MCNC: 0.7 MG/DL — SIGNIFICANT CHANGE UP (ref 0.2–1.2)
BILIRUB UR-MCNC: ABNORMAL
BLD GP AB SCN SERPL QL: SIGNIFICANT CHANGE UP
BUN SERPL-MCNC: 4 MG/DL — LOW (ref 10–20)
BUN SERPL-MCNC: <3 MG/DL — LOW (ref 10–20)
BUPRENORPHINE SCREEN, URINE RESULT: POSITIVE
BURR CELLS BLD QL SMEAR: SLIGHT — SIGNIFICANT CHANGE UP
CALCIUM SERPL-MCNC: 7.5 MG/DL — LOW (ref 8.4–10.5)
CALCIUM SERPL-MCNC: 7.8 MG/DL — LOW (ref 8.4–10.5)
CALCIUM SERPL-MCNC: 8.3 MG/DL — LOW (ref 8.4–10.5)
CALCIUM SERPL-MCNC: 8.4 MG/DL — SIGNIFICANT CHANGE UP (ref 8.4–10.5)
CHLORIDE SERPL-SCNC: 102 MMOL/L — SIGNIFICANT CHANGE UP (ref 98–110)
CHLORIDE SERPL-SCNC: 102 MMOL/L — SIGNIFICANT CHANGE UP (ref 98–110)
CHLORIDE SERPL-SCNC: 104 MMOL/L — SIGNIFICANT CHANGE UP (ref 98–110)
CHLORIDE SERPL-SCNC: 105 MMOL/L — SIGNIFICANT CHANGE UP (ref 98–110)
CO2 SERPL-SCNC: 18 MMOL/L — SIGNIFICANT CHANGE UP (ref 17–32)
CO2 SERPL-SCNC: 19 MMOL/L — SIGNIFICANT CHANGE UP (ref 17–32)
CO2 SERPL-SCNC: 21 MMOL/L — SIGNIFICANT CHANGE UP (ref 17–32)
CO2 SERPL-SCNC: 24 MMOL/L — SIGNIFICANT CHANGE UP (ref 17–32)
COCAINE METAB.OTHER UR-MCNC: NEGATIVE — SIGNIFICANT CHANGE UP
COLOR SPEC: ABNORMAL
CREAT ?TM UR-MCNC: 327 MG/DL — SIGNIFICANT CHANGE UP
CREAT SERPL-MCNC: 0.7 MG/DL — SIGNIFICANT CHANGE UP (ref 0.7–1.5)
CREAT SERPL-MCNC: 0.8 MG/DL — SIGNIFICANT CHANGE UP (ref 0.7–1.5)
CREAT SERPL-MCNC: 0.9 MG/DL — SIGNIFICANT CHANGE UP (ref 0.7–1.5)
CREAT SERPL-MCNC: 1.3 MG/DL — SIGNIFICANT CHANGE UP (ref 0.7–1.5)
DIFF PNL FLD: ABNORMAL
EGFR: 103 ML/MIN/1.73M2 — SIGNIFICANT CHANGE UP
EGFR: 121 ML/MIN/1.73M2 — SIGNIFICANT CHANGE UP
EGFR: 57 ML/MIN/1.73M2 — LOW
EGFR: 89 ML/MIN/1.73M2 — SIGNIFICANT CHANGE UP
EOSINOPHIL # BLD AUTO: 0 K/UL — SIGNIFICANT CHANGE UP (ref 0–0.7)
EOSINOPHIL # BLD AUTO: 0 K/UL — SIGNIFICANT CHANGE UP (ref 0–0.7)
EOSINOPHIL # BLD AUTO: 0.01 K/UL — SIGNIFICANT CHANGE UP (ref 0–0.7)
EOSINOPHIL # BLD AUTO: 0.05 K/UL — SIGNIFICANT CHANGE UP (ref 0–0.7)
EOSINOPHIL NFR BLD AUTO: 0 % — SIGNIFICANT CHANGE UP (ref 0–8)
EOSINOPHIL NFR BLD AUTO: 0 % — SIGNIFICANT CHANGE UP (ref 0–8)
EOSINOPHIL NFR BLD AUTO: 0.1 % — SIGNIFICANT CHANGE UP (ref 0–8)
EOSINOPHIL NFR BLD AUTO: 0.7 % — SIGNIFICANT CHANGE UP (ref 0–8)
EPI CELLS # UR: >27 /HPF — HIGH (ref 0–5)
FENTANYL UR QL: NEGATIVE — SIGNIFICANT CHANGE UP
FIBRINOGEN PPP-MCNC: >700 MG/DL — HIGH (ref 204.4–570.6)
GIANT PLATELETS BLD QL SMEAR: PRESENT — SIGNIFICANT CHANGE UP
GLUCOSE SERPL-MCNC: 104 MG/DL — HIGH (ref 70–99)
GLUCOSE SERPL-MCNC: 70 MG/DL — SIGNIFICANT CHANGE UP (ref 70–99)
GLUCOSE SERPL-MCNC: 71 MG/DL — SIGNIFICANT CHANGE UP (ref 70–99)
GLUCOSE SERPL-MCNC: 83 MG/DL — SIGNIFICANT CHANGE UP (ref 70–99)
GLUCOSE UR QL: NEGATIVE — SIGNIFICANT CHANGE UP
HCT VFR BLD CALC: 27.4 % — LOW (ref 37–47)
HCT VFR BLD CALC: 31.8 % — LOW (ref 37–47)
HCT VFR BLD CALC: 32.2 % — LOW (ref 37–47)
HCT VFR BLD CALC: 33.6 % — LOW (ref 37–47)
HGB BLD-MCNC: 11 G/DL — LOW (ref 12–16)
HGB BLD-MCNC: 11.2 G/DL — LOW (ref 12–16)
HGB BLD-MCNC: 11.3 G/DL — LOW (ref 12–16)
HGB BLD-MCNC: 9.3 G/DL — LOW (ref 12–16)
HIV 1 & 2 AB SERPL IA.RAPID: SIGNIFICANT CHANGE UP
HYALINE CASTS # UR AUTO: 57 /LPF — HIGH (ref 0–7)
IMM GRANULOCYTES NFR BLD AUTO: 1.5 % — HIGH (ref 0.1–0.3)
IMM GRANULOCYTES NFR BLD AUTO: 1.9 % — HIGH (ref 0.1–0.3)
IMM GRANULOCYTES NFR BLD AUTO: 1.9 % — HIGH (ref 0.1–0.3)
INR BLD: 0.98 RATIO — SIGNIFICANT CHANGE UP (ref 0.65–1.3)
KETONES UR-MCNC: SIGNIFICANT CHANGE UP
L&D DRUG SCREEN, URINE: SIGNIFICANT CHANGE UP
LDH SERPL L TO P-CCNC: 225 — SIGNIFICANT CHANGE UP (ref 50–242)
LDH SERPL L TO P-CCNC: 283 — HIGH (ref 50–242)
LDH SERPL L TO P-CCNC: 308 — HIGH (ref 50–242)
LDH SERPL L TO P-CCNC: 407 — HIGH (ref 50–242)
LEUKOCYTE ESTERASE UR-ACNC: ABNORMAL
LYMPHOCYTES # BLD AUTO: 0.86 K/UL — LOW (ref 1.2–3.4)
LYMPHOCYTES # BLD AUTO: 1.04 K/UL — LOW (ref 1.2–3.4)
LYMPHOCYTES # BLD AUTO: 1.12 K/UL — LOW (ref 1.2–3.4)
LYMPHOCYTES # BLD AUTO: 10.6 % — LOW (ref 20.5–51.1)
LYMPHOCYTES # BLD AUTO: 2.66 K/UL — SIGNIFICANT CHANGE UP (ref 1.2–3.4)
LYMPHOCYTES # BLD AUTO: 35.4 % — SIGNIFICANT CHANGE UP (ref 20.5–51.1)
LYMPHOCYTES # BLD AUTO: 5.7 % — LOW (ref 20.5–51.1)
LYMPHOCYTES # BLD AUTO: 8.3 % — LOW (ref 20.5–51.1)
MAGNESIUM SERPL-MCNC: 4.1 MG/DL — CRITICAL HIGH (ref 1.8–2.4)
MAGNESIUM SERPL-MCNC: 4.7 MG/DL — CRITICAL HIGH (ref 1.8–2.4)
MANUAL SMEAR VERIFICATION: SIGNIFICANT CHANGE UP
MCHC RBC-ENTMCNC: 28 PG — SIGNIFICANT CHANGE UP (ref 27–31)
MCHC RBC-ENTMCNC: 28.1 PG — SIGNIFICANT CHANGE UP (ref 27–31)
MCHC RBC-ENTMCNC: 28.4 PG — SIGNIFICANT CHANGE UP (ref 27–31)
MCHC RBC-ENTMCNC: 28.4 PG — SIGNIFICANT CHANGE UP (ref 27–31)
MCHC RBC-ENTMCNC: 33.6 G/DL — SIGNIFICANT CHANGE UP (ref 32–37)
MCHC RBC-ENTMCNC: 33.9 G/DL — SIGNIFICANT CHANGE UP (ref 32–37)
MCHC RBC-ENTMCNC: 34.6 G/DL — SIGNIFICANT CHANGE UP (ref 32–37)
MCHC RBC-ENTMCNC: 34.8 G/DL — SIGNIFICANT CHANGE UP (ref 32–37)
MCV RBC AUTO: 80.9 FL — LOW (ref 81–99)
MCV RBC AUTO: 82 FL — SIGNIFICANT CHANGE UP (ref 81–99)
MCV RBC AUTO: 83.4 FL — SIGNIFICANT CHANGE UP (ref 81–99)
MCV RBC AUTO: 83.5 FL — SIGNIFICANT CHANGE UP (ref 81–99)
METHADONE UR-MCNC: NEGATIVE — SIGNIFICANT CHANGE UP
MONOCYTES # BLD AUTO: 0.7 K/UL — HIGH (ref 0.1–0.6)
MONOCYTES # BLD AUTO: 0.83 K/UL — HIGH (ref 0.1–0.6)
MONOCYTES # BLD AUTO: 1.2 K/UL — HIGH (ref 0.1–0.6)
MONOCYTES # BLD AUTO: 1.75 K/UL — HIGH (ref 0.1–0.6)
MONOCYTES NFR BLD AUTO: 11 % — HIGH (ref 1.7–9.3)
MONOCYTES NFR BLD AUTO: 11.5 % — HIGH (ref 1.7–9.3)
MONOCYTES NFR BLD AUTO: 7.1 % — SIGNIFICANT CHANGE UP (ref 1.7–9.3)
MONOCYTES NFR BLD AUTO: 8.9 % — SIGNIFICANT CHANGE UP (ref 1.7–9.3)
NEUTROPHILS # BLD AUTO: 16.29 K/UL — HIGH (ref 1.4–6.5)
NEUTROPHILS # BLD AUTO: 3.85 K/UL — SIGNIFICANT CHANGE UP (ref 1.4–6.5)
NEUTROPHILS # BLD AUTO: 7.92 K/UL — HIGH (ref 1.4–6.5)
NEUTROPHILS # BLD AUTO: 8.14 K/UL — HIGH (ref 1.4–6.5)
NEUTROPHILS NFR BLD AUTO: 51.1 % — SIGNIFICANT CHANGE UP (ref 42.2–75.2)
NEUTROPHILS NFR BLD AUTO: 78.1 % — HIGH (ref 42.2–75.2)
NEUTROPHILS NFR BLD AUTO: 78.7 % — HIGH (ref 42.2–75.2)
NEUTROPHILS NFR BLD AUTO: 83.2 % — HIGH (ref 42.2–75.2)
NEUTS BAND # BLD: 1.8 % — SIGNIFICANT CHANGE UP (ref 0–6)
NITRITE UR-MCNC: NEGATIVE — SIGNIFICANT CHANGE UP
NRBC # BLD: 0 /100 WBCS — SIGNIFICANT CHANGE UP (ref 0–0)
NRBC # BLD: 1 /100 — HIGH (ref 0–0)
NRBC # BLD: SIGNIFICANT CHANGE UP /100 WBCS (ref 0–0)
OPIATES UR-MCNC: NEGATIVE — SIGNIFICANT CHANGE UP
OVALOCYTES BLD QL SMEAR: SLIGHT — SIGNIFICANT CHANGE UP
OXYCODONE UR-MCNC: NEGATIVE — SIGNIFICANT CHANGE UP
PCP UR-MCNC: NEGATIVE — SIGNIFICANT CHANGE UP
PH UR: 6.5 — SIGNIFICANT CHANGE UP (ref 5–8)
PLAT MORPH BLD: NORMAL — SIGNIFICANT CHANGE UP
PLATELET # BLD AUTO: 160 K/UL — SIGNIFICANT CHANGE UP (ref 130–400)
PLATELET # BLD AUTO: 181 K/UL — SIGNIFICANT CHANGE UP (ref 130–400)
PLATELET # BLD AUTO: 183 K/UL — SIGNIFICANT CHANGE UP (ref 130–400)
PLATELET # BLD AUTO: 188 K/UL — SIGNIFICANT CHANGE UP (ref 130–400)
POIKILOCYTOSIS BLD QL AUTO: SLIGHT — SIGNIFICANT CHANGE UP
POLYCHROMASIA BLD QL SMEAR: SLIGHT — SIGNIFICANT CHANGE UP
POTASSIUM SERPL-MCNC: 3.3 MMOL/L — LOW (ref 3.5–5)
POTASSIUM SERPL-MCNC: 3.4 MMOL/L — LOW (ref 3.5–5)
POTASSIUM SERPL-MCNC: 3.9 MMOL/L — SIGNIFICANT CHANGE UP (ref 3.5–5)
POTASSIUM SERPL-MCNC: 4.2 MMOL/L — SIGNIFICANT CHANGE UP (ref 3.5–5)
POTASSIUM SERPL-SCNC: 3.3 MMOL/L — LOW (ref 3.5–5)
POTASSIUM SERPL-SCNC: 3.4 MMOL/L — LOW (ref 3.5–5)
POTASSIUM SERPL-SCNC: 3.9 MMOL/L — SIGNIFICANT CHANGE UP (ref 3.5–5)
POTASSIUM SERPL-SCNC: 4.2 MMOL/L — SIGNIFICANT CHANGE UP (ref 3.5–5)
PRENATAL SYPHILIS TEST: SIGNIFICANT CHANGE UP
PROMYELOCYTES # FLD: 1.8 % — HIGH (ref 0–0)
PROPOXYPHENE QUALITATIVE URINE RESULT: NEGATIVE — SIGNIFICANT CHANGE UP
PROT ?TM UR-MCNC: 218 MG/DLG/24H — SIGNIFICANT CHANGE UP
PROT SERPL-MCNC: 4.4 G/DL — LOW (ref 6–8)
PROT SERPL-MCNC: 5.7 G/DL — LOW (ref 6–8)
PROT SERPL-MCNC: 5.7 G/DL — LOW (ref 6–8)
PROT SERPL-MCNC: 5.8 G/DL — LOW (ref 6–8)
PROT UR-MCNC: ABNORMAL
PROT/CREAT UR-RTO: 0.7 RATIO — HIGH (ref 0–0.2)
PROTHROM AB SERPL-ACNC: 11.2 SEC — SIGNIFICANT CHANGE UP (ref 9.95–12.87)
RBC # BLD: 3.28 M/UL — LOW (ref 4.2–5.4)
RBC # BLD: 3.88 M/UL — LOW (ref 4.2–5.4)
RBC # BLD: 3.98 M/UL — LOW (ref 4.2–5.4)
RBC # BLD: 4.03 M/UL — LOW (ref 4.2–5.4)
RBC # FLD: 12.4 % — SIGNIFICANT CHANGE UP (ref 11.5–14.5)
RBC # FLD: 12.6 % — SIGNIFICANT CHANGE UP (ref 11.5–14.5)
RBC # FLD: 12.9 % — SIGNIFICANT CHANGE UP (ref 11.5–14.5)
RBC # FLD: 12.9 % — SIGNIFICANT CHANGE UP (ref 11.5–14.5)
RBC BLD AUTO: ABNORMAL
RBC CASTS # UR COMP ASSIST: 1 /HPF — SIGNIFICANT CHANGE UP (ref 0–4)
SARS-COV-2 RNA SPEC QL NAA+PROBE: SIGNIFICANT CHANGE UP
SODIUM SERPL-SCNC: 136 MMOL/L — SIGNIFICANT CHANGE UP (ref 135–146)
SODIUM SERPL-SCNC: 138 MMOL/L — SIGNIFICANT CHANGE UP (ref 135–146)
SODIUM SERPL-SCNC: 139 MMOL/L — SIGNIFICANT CHANGE UP (ref 135–146)
SODIUM SERPL-SCNC: 143 MMOL/L — SIGNIFICANT CHANGE UP (ref 135–146)
SP GR SPEC: 1.02 — SIGNIFICANT CHANGE UP (ref 1.01–1.03)
URATE SERPL-MCNC: 5 MG/DL — SIGNIFICANT CHANGE UP (ref 2.5–7)
URATE SERPL-MCNC: 6.3 MG/DL — SIGNIFICANT CHANGE UP (ref 2.5–7)
URATE SERPL-MCNC: 6.7 MG/DL — SIGNIFICANT CHANGE UP (ref 2.5–7)
URATE SERPL-MCNC: 6.9 MG/DL — SIGNIFICANT CHANGE UP (ref 2.5–7)
UROBILINOGEN FLD QL: ABNORMAL
WBC # BLD: 10.42 K/UL — SIGNIFICANT CHANGE UP (ref 4.8–10.8)
WBC # BLD: 19.57 K/UL — HIGH (ref 4.8–10.8)
WBC # BLD: 7.52 K/UL — SIGNIFICANT CHANGE UP (ref 4.8–10.8)
WBC # BLD: 9.84 K/UL — SIGNIFICANT CHANGE UP (ref 4.8–10.8)
WBC # FLD AUTO: 10.42 K/UL — SIGNIFICANT CHANGE UP (ref 4.8–10.8)
WBC # FLD AUTO: 19.57 K/UL — HIGH (ref 4.8–10.8)
WBC # FLD AUTO: 7.52 K/UL — SIGNIFICANT CHANGE UP (ref 4.8–10.8)
WBC # FLD AUTO: 9.84 K/UL — SIGNIFICANT CHANGE UP (ref 4.8–10.8)
WBC UR QL: 82 /HPF — HIGH (ref 0–5)

## 2022-10-03 PROCEDURE — 59514 CESAREAN DELIVERY ONLY: CPT | Mod: U7

## 2022-10-03 PROCEDURE — 99254 IP/OBS CNSLTJ NEW/EST MOD 60: CPT

## 2022-10-03 PROCEDURE — 99222 1ST HOSP IP/OBS MODERATE 55: CPT

## 2022-10-03 RX ORDER — MAGNESIUM HYDROXIDE 400 MG/1
30 TABLET, CHEWABLE ORAL
Refills: 0 | Status: DISCONTINUED | OUTPATIENT
Start: 2022-10-03 | End: 2022-10-08

## 2022-10-03 RX ORDER — CEFAZOLIN SODIUM 1 G
2000 VIAL (EA) INJECTION ONCE
Refills: 0 | Status: COMPLETED | OUTPATIENT
Start: 2022-10-03 | End: 2022-10-03

## 2022-10-03 RX ORDER — ACETAMINOPHEN 500 MG
975 TABLET ORAL
Refills: 0 | Status: DISCONTINUED | OUTPATIENT
Start: 2022-10-03 | End: 2022-10-04

## 2022-10-03 RX ORDER — SODIUM CHLORIDE 9 MG/ML
300 INJECTION, SOLUTION INTRAVENOUS ONCE
Refills: 0 | Status: DISCONTINUED | OUTPATIENT
Start: 2022-10-03 | End: 2022-10-03

## 2022-10-03 RX ORDER — HYDRALAZINE HCL 50 MG
10 TABLET ORAL ONCE
Refills: 0 | Status: DISCONTINUED | OUTPATIENT
Start: 2022-10-03 | End: 2022-10-03

## 2022-10-03 RX ORDER — OXYTOCIN 10 UNIT/ML
333.33 VIAL (ML) INJECTION
Qty: 20 | Refills: 0 | Status: DISCONTINUED | OUTPATIENT
Start: 2022-10-03 | End: 2022-10-04

## 2022-10-03 RX ORDER — MAGNESIUM SULFATE 500 MG/ML
2 VIAL (ML) INJECTION
Qty: 40 | Refills: 0 | Status: DISCONTINUED | OUTPATIENT
Start: 2022-10-03 | End: 2022-10-04

## 2022-10-03 RX ORDER — CHLORHEXIDINE GLUCONATE 213 G/1000ML
1 SOLUTION TOPICAL ONCE
Refills: 0 | Status: DISCONTINUED | OUTPATIENT
Start: 2022-10-03 | End: 2022-10-03

## 2022-10-03 RX ORDER — CITRIC ACID/SODIUM CITRATE 300-500 MG
30 SOLUTION, ORAL ORAL ONCE
Refills: 0 | Status: COMPLETED | OUTPATIENT
Start: 2022-10-03 | End: 2022-10-03

## 2022-10-03 RX ORDER — CITRIC ACID/SODIUM CITRATE 300-500 MG
15 SOLUTION, ORAL ORAL EVERY 6 HOURS
Refills: 0 | Status: DISCONTINUED | OUTPATIENT
Start: 2022-10-03 | End: 2022-10-03

## 2022-10-03 RX ORDER — HYDRALAZINE HCL 50 MG
5 TABLET ORAL ONCE
Refills: 0 | Status: COMPLETED | OUTPATIENT
Start: 2022-10-03 | End: 2022-10-03

## 2022-10-03 RX ORDER — OXYCODONE HYDROCHLORIDE 5 MG/1
5 TABLET ORAL ONCE
Refills: 0 | Status: DISCONTINUED | OUTPATIENT
Start: 2022-10-03 | End: 2022-10-08

## 2022-10-03 RX ORDER — ENOXAPARIN SODIUM 100 MG/ML
40 INJECTION SUBCUTANEOUS EVERY 24 HOURS
Refills: 0 | Status: DISCONTINUED | OUTPATIENT
Start: 2022-10-04 | End: 2022-10-04

## 2022-10-03 RX ORDER — NIFEDIPINE 30 MG
10 TABLET, EXTENDED RELEASE 24 HR ORAL ONCE
Refills: 0 | Status: COMPLETED | OUTPATIENT
Start: 2022-10-03 | End: 2022-10-03

## 2022-10-03 RX ORDER — TETANUS TOXOID, REDUCED DIPHTHERIA TOXOID AND ACELLULAR PERTUSSIS VACCINE, ADSORBED 5; 2.5; 8; 8; 2.5 [IU]/.5ML; [IU]/.5ML; UG/.5ML; UG/.5ML; UG/.5ML
0.5 SUSPENSION INTRAMUSCULAR ONCE
Refills: 0 | Status: DISCONTINUED | OUTPATIENT
Start: 2022-10-03 | End: 2022-10-08

## 2022-10-03 RX ORDER — CEFAZOLIN SODIUM 1 G
3000 VIAL (EA) INJECTION ONCE
Refills: 0 | Status: DISCONTINUED | OUTPATIENT
Start: 2022-10-03 | End: 2022-10-03

## 2022-10-03 RX ORDER — METOCLOPRAMIDE HCL 10 MG
10 TABLET ORAL ONCE
Refills: 0 | Status: DISCONTINUED | OUTPATIENT
Start: 2022-10-03 | End: 2022-10-03

## 2022-10-03 RX ORDER — SENNA PLUS 8.6 MG/1
2 TABLET ORAL AT BEDTIME
Refills: 0 | Status: DISCONTINUED | OUTPATIENT
Start: 2022-10-03 | End: 2022-10-08

## 2022-10-03 RX ORDER — SODIUM CHLORIDE 9 MG/ML
1000 INJECTION, SOLUTION INTRAVENOUS
Refills: 0 | Status: DISCONTINUED | OUTPATIENT
Start: 2022-10-03 | End: 2022-10-03

## 2022-10-03 RX ORDER — DIPHENHYDRAMINE HCL 50 MG
25 CAPSULE ORAL EVERY 6 HOURS
Refills: 0 | Status: DISCONTINUED | OUTPATIENT
Start: 2022-10-03 | End: 2022-10-08

## 2022-10-03 RX ORDER — AMPICILLIN TRIHYDRATE 250 MG
1 CAPSULE ORAL EVERY 4 HOURS
Refills: 0 | Status: DISCONTINUED | OUTPATIENT
Start: 2022-10-03 | End: 2022-10-03

## 2022-10-03 RX ORDER — OXYTOCIN 10 UNIT/ML
333.33 VIAL (ML) INJECTION
Qty: 20 | Refills: 0 | Status: DISCONTINUED | OUTPATIENT
Start: 2022-10-03 | End: 2022-10-03

## 2022-10-03 RX ORDER — SODIUM CHLORIDE 9 MG/ML
1000 INJECTION, SOLUTION INTRAVENOUS
Refills: 0 | Status: DISCONTINUED | OUTPATIENT
Start: 2022-10-03 | End: 2022-10-04

## 2022-10-03 RX ORDER — MAGNESIUM SULFATE 500 MG/ML
4 VIAL (ML) INJECTION ONCE
Refills: 0 | Status: COMPLETED | OUTPATIENT
Start: 2022-10-03 | End: 2022-10-03

## 2022-10-03 RX ORDER — INFLUENZA VIRUS VACCINE 15; 15; 15; 15 UG/.5ML; UG/.5ML; UG/.5ML; UG/.5ML
0.5 SUSPENSION INTRAMUSCULAR ONCE
Refills: 0 | Status: DISCONTINUED | OUTPATIENT
Start: 2022-10-03 | End: 2022-10-03

## 2022-10-03 RX ORDER — SIMETHICONE 80 MG/1
80 TABLET, CHEWABLE ORAL EVERY 6 HOURS
Refills: 0 | Status: DISCONTINUED | OUTPATIENT
Start: 2022-10-03 | End: 2022-10-08

## 2022-10-03 RX ORDER — LABETALOL HCL 100 MG
20 TABLET ORAL ONCE
Refills: 0 | Status: DISCONTINUED | OUTPATIENT
Start: 2022-10-03 | End: 2022-10-03

## 2022-10-03 RX ORDER — OXYTOCIN 10 UNIT/ML
2 VIAL (ML) INJECTION
Qty: 30 | Refills: 0 | Status: DISCONTINUED | OUTPATIENT
Start: 2022-10-03 | End: 2022-10-03

## 2022-10-03 RX ORDER — FENTANYL/BUPIVACAINE/NS/PF 2MCG/ML-.1
250 PLASTIC BAG, INJECTION (ML) INJECTION
Refills: 0 | Status: DISCONTINUED | OUTPATIENT
Start: 2022-10-03 | End: 2022-10-03

## 2022-10-03 RX ORDER — LANOLIN
1 OINTMENT (GRAM) TOPICAL EVERY 6 HOURS
Refills: 0 | Status: DISCONTINUED | OUTPATIENT
Start: 2022-10-03 | End: 2022-10-08

## 2022-10-03 RX ORDER — HYDRALAZINE HCL 50 MG
10 TABLET ORAL ONCE
Refills: 0 | Status: COMPLETED | OUTPATIENT
Start: 2022-10-03 | End: 2022-10-03

## 2022-10-03 RX ORDER — FAMOTIDINE 10 MG/ML
20 INJECTION INTRAVENOUS ONCE
Refills: 0 | Status: COMPLETED | OUTPATIENT
Start: 2022-10-03 | End: 2022-10-03

## 2022-10-03 RX ORDER — IBUPROFEN 200 MG
600 TABLET ORAL EVERY 6 HOURS
Refills: 0 | Status: DISCONTINUED | OUTPATIENT
Start: 2022-10-03 | End: 2022-10-04

## 2022-10-03 RX ORDER — ONDANSETRON 8 MG/1
4 TABLET, FILM COATED ORAL ONCE
Refills: 0 | Status: COMPLETED | OUTPATIENT
Start: 2022-10-03 | End: 2022-10-03

## 2022-10-03 RX ORDER — AZITHROMYCIN 500 MG/1
500 TABLET, FILM COATED ORAL ONCE
Refills: 0 | Status: COMPLETED | OUTPATIENT
Start: 2022-10-03 | End: 2022-10-03

## 2022-10-03 RX ORDER — ACETAMINOPHEN 500 MG
1000 TABLET ORAL ONCE
Refills: 0 | Status: COMPLETED | OUTPATIENT
Start: 2022-10-03 | End: 2022-10-03

## 2022-10-03 RX ORDER — AMPICILLIN TRIHYDRATE 250 MG
2 CAPSULE ORAL ONCE
Refills: 0 | Status: COMPLETED | OUTPATIENT
Start: 2022-10-03 | End: 2022-10-03

## 2022-10-03 RX ORDER — OXYCODONE HYDROCHLORIDE 5 MG/1
5 TABLET ORAL
Refills: 0 | Status: COMPLETED | OUTPATIENT
Start: 2022-10-03 | End: 2022-10-10

## 2022-10-03 RX ORDER — CEFAZOLIN SODIUM 1 G
1000 VIAL (EA) INJECTION ONCE
Refills: 0 | Status: COMPLETED | OUTPATIENT
Start: 2022-10-03 | End: 2022-10-03

## 2022-10-03 RX ORDER — DEXAMETHASONE 0.5 MG/5ML
4 ELIXIR ORAL EVERY 6 HOURS
Refills: 0 | Status: DISCONTINUED | OUTPATIENT
Start: 2022-10-03 | End: 2022-10-03

## 2022-10-03 RX ORDER — NALOXONE HYDROCHLORIDE 4 MG/.1ML
0.1 SPRAY NASAL
Refills: 0 | Status: DISCONTINUED | OUTPATIENT
Start: 2022-10-03 | End: 2022-10-03

## 2022-10-03 RX ADMIN — Medication 5 MILLIGRAM(S): at 06:14

## 2022-10-03 RX ADMIN — Medication 50 GM/HR: at 06:30

## 2022-10-03 RX ADMIN — Medication 100 MILLIGRAM(S): at 19:22

## 2022-10-03 RX ADMIN — Medication 400 MILLIGRAM(S): at 07:48

## 2022-10-03 RX ADMIN — Medication 2 MILLIUNIT(S)/MIN: at 11:55

## 2022-10-03 RX ADMIN — Medication 5 MILLIGRAM(S): at 22:07

## 2022-10-03 RX ADMIN — Medication 10 MILLIGRAM(S): at 07:31

## 2022-10-03 RX ADMIN — Medication 400 MILLIGRAM(S): at 22:00

## 2022-10-03 RX ADMIN — Medication 1000 MILLIGRAM(S): at 22:42

## 2022-10-03 RX ADMIN — Medication 100 MILLIGRAM(S): at 19:57

## 2022-10-03 RX ADMIN — Medication 108 GRAM(S): at 10:47

## 2022-10-03 RX ADMIN — Medication 300 GRAM(S): at 06:30

## 2022-10-03 RX ADMIN — Medication 200 GRAM(S): at 06:49

## 2022-10-03 RX ADMIN — AZITHROMYCIN 255 MILLIGRAM(S): 500 TABLET, FILM COATED ORAL at 20:22

## 2022-10-03 RX ADMIN — Medication 108 GRAM(S): at 14:51

## 2022-10-03 RX ADMIN — Medication 50 GM/HR: at 21:51

## 2022-10-03 RX ADMIN — Medication 15 MILLILITER(S): at 09:41

## 2022-10-03 RX ADMIN — Medication 108 GRAM(S): at 18:43

## 2022-10-03 RX ADMIN — FAMOTIDINE 20 MILLIGRAM(S): 10 INJECTION INTRAVENOUS at 13:30

## 2022-10-03 RX ADMIN — ONDANSETRON 4 MILLIGRAM(S): 8 TABLET, FILM COATED ORAL at 15:12

## 2022-10-03 NOTE — PROGRESS NOTE ADULT - SUBJECTIVE AND OBJECTIVE BOX
PGY1 Magnesium Note     Subjective:   Pt evaluated at bedside for magnesium check. She denies headache, vision changes, dizziness, SOB, chest pain, RUQ/epigastric pain. Five minutes later patient was evaluated at bedside for a decel. Patient was sitting up and vomiting. After 2 min the FHR recovered, the ISE was checked and determined to be in place.    Objective:   Vital Signs Last 24 Hrs  T(C): 36.7 (03 Oct 2022 09:41), Max: 37.2 (03 Oct 2022 05:26)  T(F): 98.06 (03 Oct 2022 07:14), Max: 98.9 (03 Oct 2022 05:26)  HR: 107 (03 Oct 2022 10:05) (67 - 120)  BP: 147/70 (03 Oct 2022 09:53) (141/69 - 197/104)  RR: 16 (03 Oct 2022 09:41) (16 - 16)  SpO2: 100% (03 Oct 2022 10:05) (99% - 100%)    10-03-22 @ 07:01  -  10-03-22 @ 10:06  --------------------------------------------------------  IN: 375 mL / OUT: 0 mL / NET: 375 mL    Gen: NAD, AAOx3  CV: S1S2, RRR, no M/R/G  Pulm: CTAB, no rhonchi or rales or wheezing  Ext: no calf tenderness or edema SCDs in place  Neuro: 2+ DTR bilaterally  Abd: soft, gravid, nontender, no rebound or guarding, no epigastric tenderness    EFM: 150 bpm/moderate variability/+accels/prolonged decel  Weinert: q3min  SVE: 3/80/-2 by Dr. Armando    Medications:  hydralazine IVP: 5 mg 10/3 @618  mag: started 10/3 @30  amp: first dose 10/3 @45    Labs:                         11.2   7.52  )-----------( 183      ( 03 Oct 2022 06:14 )             32.2   10-03    139  |  104  |  <3<L>  ----------------------------<  70  3.4<L>   |  24  |  0.7    Ca    8.3<L>      03 Oct 2022 06:14    TPro  5.7<L>  /  Alb  3.0<L>  /  TBili  0.6  /  DBili  x   /  AST  22  /  ALT  11  /  AlkPhos  294<H>  10-03      Urinalysis Basic - ( 03 Oct 2022 08:10 )    Color: Karla / Appearance: Slightly Turbid / S.020 / pH: x  Gluc: x / Ketone: Trace  / Bili: Small / Urobili: 12 mg/dL   Blood: x / Protein: 100 mg/dL / Nitrite: Negative   Leuk Esterase: Large / RBC: x / WBC x   Sq Epi: x / Non Sq Epi: x / Bacteria: x

## 2022-10-03 NOTE — CONSULT NOTE ADULT - SUBJECTIVE AND OBJECTIVE BOX
MFM Consult    TRIAGE/ADMISSIONI HPI:  HPI:    26yo now P1 POD#0 s/p primary  section for arrest of dilation in the setting of pre-eclampsia with severe features. Presented to L&D at 0540 on 10/3/2022 with severe range blood pressure and lower extremity edema. Diagnosed with pre-eclampsia with severe features and received 5mg IVP hydralazine at 0618 and 10mg hydralazine @0720. Magnesium for seizure prophylaxis started at 0630.  Labor course complicated by arrest of dilation and oliguria noted prior to proceeding to the operating room. Per surgical team,  section was uncomplicated and no concern for an intraoperative bladder injury. Postoperatively patient had no urine output and continued to intermittently have severe range blood pressures requiring an additional dose of 5mg IVP of hydralazine at 2207.   M consulted for management of anuria in the setting of pre-eclampsia with severe features     PAST MEDICAL & SURGICAL HISTORY:   section   S/P dilation and curettage      Obstetric history:     2 ETOP with D&C   1 primary LTCS (arrest of dilation at 5-6cm, pre-eclampsia with severe features)     GYN history:  No abnormal pap smears, no STDs     Allergies: No Known Allergies    Intolerances: None       MEDICATIONS  (STANDING):  acetaminophen     Tablet .. 975 milliGRAM(s) Oral <User Schedule>  diphtheria/tetanus/pertussis (acellular) Vaccine (ADAcel) 0.5 milliLiter(s) IntraMuscular once  ibuprofen  Tablet. 600 milliGRAM(s) Oral every 6 hours  lactated ringers. 1000 milliLiter(s) (125 mL/Hr) IV Continuous <Continuous>  magnesium sulfate Infusion 2 Gm/Hr (50 mL/Hr) IV Continuous <Continuous>  oxytocin Infusion 333.333 milliUNIT(s)/Min (1000 mL/Hr) IV Continuous <Continuous>  senna 2 Tablet(s) Oral at bedtime  simethicone 80 milliGRAM(s) Chew every 6 hours      FAMILY HISTORY:  No pertinent family history in first degree relatives    Social history:  No alcohol use, drug use, or smoking.     Review of systems:  A complete review of systems was obtained and is negative except as described in the HPI.    General:  In no apparent distress  T(F): 98.4 (10-03-22 @ 21:30), Max: 98.9 (10-03-22 @ 05:26)  HR: 103 (10-03-22 @ 23:49) (67 - 126)  BP: 170/84 (10-03-22 @ 23:49) (113/62 - 197/104)  RR: 16 (10-03-22 @ 09:41) (16 - 16)  SpO2: 99% (10-03-22 @ 23:48) (93% - 100%)  I&O's Summary    02 Oct 2022 07:01  -  03 Oct 2022 07:00  --------------------------------------------------------  IN: 125 mL / OUT: 0 mL / NET: 125 mL    03 Oct 2022 07:01  -  03 Oct 2022 23:52  --------------------------------------------------------  IN: 3115 mL / OUT: 155 mL / NET: 2960 mL    UO: 0cc (from 8208-9111)     HEENT:  Atraumatic.  Extraocular muscles intact.  CV:  Normal S1 S2.  No murmurs.  Pulmonary:  Clear to auscultation bilaterally.  No wheezing.  Abdomen:  Soft, appropriately tender near Pfannenstiel incision, nondistended, no rebound, no guarding. Surgical dressing with scant serosanguinous drainage   Musculoskeletal:  No calf tenderness  Neurology:  Deep tendon reflexes 2+ bilaterally.     LABORATORY:                        11.3   19.57 )-----------( 188      ( 03 Oct 2022 22:39 )             33.6     10-03    136  |  102  |  <3<L>  ----------------------------<  71  3.9   |  19  |  0.9    Ca    7.5<L>      03 Oct 2022 18:08  Mg     4.7     10-03    TPro  4.4<L>  /  Alb  2.5<L>  /  TBili  0.5  /  DBili  x   /  AST  17  /  ALT  8   /  AlkPhos  232<H>  10-03    PT/INR - ( 03 Oct 2022 06:14 )   PT: 11.20 sec;   INR: 0.98 ratio         PTT - ( 03 Oct 2022 06:14 )  PTT:24.6 sec  Urinalysis Basic - ( 03 Oct 2022 08:10 )    Color: Karla / Appearance: Slightly Turbid / S.020 / pH: x  Gluc: x / Ketone: Trace  / Bili: Small / Urobili: 12 mg/dL   Blood: x / Protein: 100 mg/dL / Nitrite: Negative   Leuk Esterase: Large / RBC: 1 /HPF / WBC 82 /HPF   Sq Epi: x / Non Sq Epi: >27 /HPF / Bacteria: Moderate        IMAGING:   BSS: bladder nondistended and collapsed around indwelling urinary catheter         MFM Consult    HPI:    26yo now P1 POD#0 s/p primary  section for arrest of dilation in the setting of pre-eclampsia with severe features. Presented to L&D at 0540 on 10/3/2022 with severe range blood pressure and lower extremity edema. Diagnosed with pre-eclampsia with severe features and received 5mg IVP hydralazine at 0618 and 10mg hydralazine @0720. Magnesium for seizure prophylaxis started at 0630.  Labor course complicated by arrest of dilation and oliguria noted prior to proceeding to the operating room. Per surgical team,  section was uncomplicated and no concern for an intraoperative bladder injury. Postoperatively patient had no urine output and continued to intermittently have severe range blood pressures requiring an additional dose of 5mg IVP of hydralazine at 2207.   M consulted for management of anuria in the setting of pre-eclampsia with severe features     PAST MEDICAL & SURGICAL HISTORY:   section   S/P dilation and curettage      Obstetric history:     2 ETOP with D&C   1 primary LTCS (arrest of dilation at 5-6cm, pre-eclampsia with severe features)     GYN history:  No abnormal pap smears, no STDs     Allergies: No Known Allergies    Intolerances: None       MEDICATIONS  (STANDING):  acetaminophen     Tablet .. 975 milliGRAM(s) Oral <User Schedule>  diphtheria/tetanus/pertussis (acellular) Vaccine (ADAcel) 0.5 milliLiter(s) IntraMuscular once  ibuprofen  Tablet. 600 milliGRAM(s) Oral every 6 hours  lactated ringers. 1000 milliLiter(s) (125 mL/Hr) IV Continuous <Continuous>  magnesium sulfate Infusion 2 Gm/Hr (50 mL/Hr) IV Continuous <Continuous>  oxytocin Infusion 333.333 milliUNIT(s)/Min (1000 mL/Hr) IV Continuous <Continuous>  senna 2 Tablet(s) Oral at bedtime  simethicone 80 milliGRAM(s) Chew every 6 hours      FAMILY HISTORY:  No pertinent family history in first degree relatives    Social history:  No alcohol use, drug use, or smoking.     Review of systems:  A complete review of systems was obtained and is negative except as described in the HPI.    General:  In no apparent distress  T(F): 98.4 (10-03-22 @ 21:30), Max: 98.9 (10-03-22 @ 05:26)  HR: 103 (10-03-22 @ 23:49) (67 - 126)  BP: 170/84 (10-03-22 @ 23:49) (113/62 - 197/104)  RR: 16 (10-03-22 @ 09:41) (16 - 16)  SpO2: 99% (10-03-22 @ 23:48) (93% - 100%)  I&O's Summary    02 Oct 2022 07:01  -  03 Oct 2022 07:00  --------------------------------------------------------  IN: 125 mL / OUT: 0 mL / NET: 125 mL    03 Oct 2022 07:01  -  03 Oct 2022 23:52  --------------------------------------------------------  IN: 3115 mL / OUT: 155 mL / NET: 2960 mL    UO: 0cc (from 5836-8059)     HEENT:  Atraumatic.  Extraocular muscles intact.  CV:  Normal S1 S2.  No murmurs.  Pulmonary:  Clear to auscultation bilaterally.  No wheezing.  Abdomen:  Soft, appropriately tender near Pfannenstiel incision, nondistended, no rebound, no guarding. Surgical dressing with scant serosanguinous drainage   Musculoskeletal:  No calf tenderness  Neurology:  Deep tendon reflexes 2+ bilaterally.     LABORATORY:                        11.3   19.57 )-----------( 188      ( 03 Oct 2022 22:39 )             33.6     10-03    136  |  102  |  <3<L>  ----------------------------<  71  3.9   |  19  |  0.9    Ca    7.5<L>      03 Oct 2022 18:08  Mg     4.7     10    TPro  4.4<L>  /  Alb  2.5<L>  /  TBili  0.5  /  DBili  x   /  AST  17  /  ALT  8   /  AlkPhos  232<H>  10    PT/INR - ( 03 Oct 2022 06:14 )   PT: 11.20 sec;   INR: 0.98 ratio         PTT - ( 03 Oct 2022 06:14 )  PTT:24.6 sec  Urinalysis Basic - ( 03 Oct 2022 08:10 )    Color: Karla / Appearance: Slightly Turbid / S.020 / pH: x  Gluc: x / Ketone: Trace  / Bili: Small / Urobili: 12 mg/dL   Blood: x / Protein: 100 mg/dL / Nitrite: Negative   Leuk Esterase: Large / RBC: 1 /HPF / WBC 82 /HPF   Sq Epi: x / Non Sq Epi: >27 /HPF / Bacteria: Moderate        IMAGING:   BSS: bladder nondistended and collapsed around indwelling urinary catheter         MFM Consult    HPI:    26yo now P1 POD#0 s/p primary  section for arrest of dilation in the setting of pre-eclampsia with severe features. Presented to L&D at 0540 on 10/3/2022 with severe range blood pressure and lower extremity edema. Diagnosed with pre-eclampsia with severe features and received 5mg IVP hydralazine at 0618 and 10mg hydralazine @0720. Magnesium for seizure prophylaxis started at 0630.  Labor course complicated by arrest of dilation and oliguria noted prior to proceeding to the operating room. Per surgical team,  section was uncomplicated and no concern for an intraoperative bladder injury. Postoperatively patient had no urine output and continued to intermittently have severe range blood pressures requiring an additional dose of 5mg IVP of hydralazine at 2207.   M consulted for management of anuria in the setting of pre-eclampsia with severe features     PAST MEDICAL & SURGICAL HISTORY:   section   S/P dilation and curettage      Obstetric history:     2 ETOP with D&C   1 primary LTCS (arrest of dilation at 5-6cm, pre-eclampsia with severe features)     GYN history:  No abnormal pap smears, no STDs     Allergies: No Known Allergies    Intolerances: None       MEDICATIONS  (STANDING):  acetaminophen     Tablet .. 975 milliGRAM(s) Oral <User Schedule>  diphtheria/tetanus/pertussis (acellular) Vaccine (ADAcel) 0.5 milliLiter(s) IntraMuscular once  ibuprofen  Tablet. 600 milliGRAM(s) Oral every 6 hours  lactated ringers. 1000 milliLiter(s) (125 mL/Hr) IV Continuous <Continuous>  magnesium sulfate Infusion 2 Gm/Hr (50 mL/Hr) IV Continuous <Continuous>  oxytocin Infusion 333.333 milliUNIT(s)/Min (1000 mL/Hr) IV Continuous <Continuous>  senna 2 Tablet(s) Oral at bedtime  simethicone 80 milliGRAM(s) Chew every 6 hours      FAMILY HISTORY:  Mother cHTN, prediabetes    Social history:  No alcohol use, drug use, or smoking.     Review of systems:  A complete review of systems was obtained and is negative except as described in the HPI.    General:  In no apparent distress  T(F): 98.4 (10-03-22 @ 21:30), Max: 98.9 (10-03-22 @ 05:26)  HR: 103 (10-03-22 @ 23:49) (67 - 126)  BP: 170/84 (10-03-22 @ 23:49) (113/62 - 197/104)  RR: 16 (10-03-22 @ 09:41) (16 - 16)  SpO2: 99% (10-03-22 @ 23:48) (93% - 100%)  I&O's Summary    02 Oct 2022 07:01  -  03 Oct 2022 07:00  --------------------------------------------------------  IN: 125 mL / OUT: 0 mL / NET: 125 mL    03 Oct 2022 07:01  -  03 Oct 2022 23:52  --------------------------------------------------------  IN: 3115 mL / OUT: 155 mL / NET: 2960 mL    UO: 0cc (from 0676-7066)     Urine output  11 AM 45  12 PM 10  1 PM 10  2 PM 5  3 PM 0  4 PM  5  5  PM 15  6  PM 15  7  PM 50  (8:10 PM delivery)  10 PM - present 0    HEENT:  Atraumatic.  Extraocular muscles intact.  CV:  Normal S1 S2.  No murmurs.  Pulmonary:  Clear to auscultation bilaterally.  No wheezing.  Abdomen:  Soft, appropriately tender near Pfannenstiel incision, nondistended, no rebound, no guarding. Surgical dressing with scant serosanguinous drainage   Musculoskeletal:  No calf tenderness  Neurology:  Deep tendon reflexes 2+ bilaterally.     LABORATORY:                            11.3   19.57 )-----------( 188      ( 03 Oct 2022 22:39 )             33.6                         9.3    10.42 )-----------( 160      ( 03 Oct 2022 18:08 )             27.4                         11.0   9.84  )-----------( 181      ( 03 Oct 2022 12:11 )             31.8                         11.2   7.52  )-----------( 183      ( 03 Oct 2022 06:14 )             32.2         10-03    143  |  105  |  4   ----------------------------<  104  4.2   |  18  |  1.3    GFR 57      10-03    136  |  102  |  <3  ----------------------------<  71  3.9   |  19  |  0.9  10-03    138  |  102  |  <3  ----------------------------<  83  3.3   |  21  |  0.8  10-03    139  |  104  |  <3  ----------------------------<  70  3.4   |  24  |  0.7    Ca    7.8      03 Oct 2022 22:39  Ca    7.5      03 Oct 2022 18:08  Ca    8.4      03 Oct 2022 12:11  Ca    8.3      03 Oct 2022 06:14  Mg     6.3 [1.8 - 2.4]     10-  Mg     4.7 [1.8 - 2.4]     10-03  Mg     4.1 [1.8 - 2.4]     10-    TPro  5.7  /  Alb  2.9  /  TBili  0.6  /  DBili  x   /  AST  26  /  ALT  10  /  AlkPhos  303  10-03-22  TPro  4.4  /  Alb  2.5  /  TBili  0.5  /  DBili  x   /  AST  17  /  ALT  8   /  AlkPhos  232  10-03-22  TPro  5.8  /  Alb  3.0  /  TBili  0.7  /  DBili  x   /  AST  23  /  ALT  11  /  AlkPhos  296  10-03-22  TPro  5.7  /  Alb  3.0  /  TBili  0.6  /  DBili  x   /  AST  22  /  ALT  11  /  AlkPhos  294  10-03-22    PT/INR - ( 03 Oct 2022 06:14 )   PT: 11.20 sec;   INR: 0.98 ratio         PTT - ( 03 Oct 2022 06:14 )  PTT:24.6 sec  Urinalysis Basic - ( 03 Oct 2022 08:10 )    Color: Karla / Appearance: Slightly Turbid / S.020 / pH: x  Gluc: x / Ketone: Trace  / Bili: Small / Urobili: 12 mg/dL   Blood: x / Protein: 100 mg/dL / Nitrite: Negative   Leuk Esterase: Large / RBC: 1 /HPF / WBC 82 /HPF   Sq Epi: x / Non Sq Epi: >27 /HPF / Bacteria: Moderate        IMAGING:   BSS: bladder nondistended and collapsed around indwelling urinary catheter

## 2022-10-03 NOTE — PROGRESS NOTE ADULT - ASSESSMENT
A/P: 26 yo  @37w6d, GBS pos, morbidly obese, suspected fetal macrosomia (AC>97%), preeclampsia with severe features in early labor with category 2 tracing undergoing resuscitation  -Continue current management   -Pain management prn  -Continuous EFM/toco  -F/u UrpRcr  -Reevaluate     Dr. Palm to be made aware

## 2022-10-03 NOTE — CHART NOTE - NSCHARTNOTEFT_GEN_A_CORE
PGY1 Note    FHR was noted to decel to 120bpm from 150 at 0809. Patient was AROM, clear, ISE and IUPC was placed by Dr. Armando. After resuscitative measures, the FHR recovered 0818.    SVE 3/80/-2, AROMcheco Dr. at bedside and Dr. Palm aware.

## 2022-10-03 NOTE — PROGRESS NOTE ADULT - ASSESSMENT
A/P:  27y  at 37w6d GA with preeclampsia with severe features on magnesium for seizure prophylaxis, in labor, on pitocin, s/p epidural, s/p AROM clear.     -At this time, patient has not made progress since .  Due to suspected macrosomia, poor fetal tolerance to pitocin and preeclampsia with severe features, discussed possibility of proceeding with primary  section.  R/B/A of c/s explained.  Patient voiced understanding and at this time would like to proceed with augmentation.  -cont ISE/IUPC  -clear liquid diet, IVF  -pain management with epidural  -will repeat labs at 6PM   -magnesium until 24hrs postpartum  -seizure precautions  -will re-evaluate in 1 hour    Dr. Palm aware.

## 2022-10-03 NOTE — PROGRESS NOTE ADULT - ASSESSMENT
A/P:  27y  at 37w6d GA with preeclampsia with severe features on magnesium for seizure prophylaxis, in labor, on pitocin, s/p epidural, s/p AROM clear.     -At this time, patient has not made progress since .  Due to suspected macrosomia, arrest of dilation and preeclampsia with severe features, discussed recommendation of proceeding with primary  section.  R/B/A of c/s explained.  Patient voiced understanding and signed consent.  -NPO  -ancef preop  -anesthesia and peds to be made aware  -pain management with epidural  -f/u 6PM labs  -magnesium until 24hrs postpartum  -seizure precautions    Dr. Palm aware.

## 2022-10-03 NOTE — CHART NOTE - NSCHARTNOTEFT_GEN_A_CORE
PGY 1 Note    Pt seen at bedside follow back to back severe range BPs, @2146 153/111, @2101 171/95. 5mg IVP hydralazine administered @2207.     Reevaluate BPs in 20 min    Dr. Campbell aware, Dr. Palm to be aware

## 2022-10-03 NOTE — PROGRESS NOTE ADULT - ASSESSMENT
A/P: 27y  at 37w6d GA with preeclampsia with severe features on magnesium for seizure prophylaxis, blood pressures currently in the normal range, s/p epidural in early labor progressing well  - continue magnesium until 24hrs post partum  - next labs and magnesium level at 1800  - f/u BPs; Current BPs 135/69  - stricts I/Os  - continuous EFM and toco  - Seizure precautions  - Reevaluate in 2hours     Dr. Palm aware

## 2022-10-03 NOTE — OB RN TRIAGE NOTE - FALL HARM RISK - UNIVERSAL INTERVENTIONS
Bed in lowest position, wheels locked, appropriate side rails in place/Call bell, personal items and telephone in reach/Instruct patient to call for assistance before getting out of bed or chair/Non-slip footwear when patient is out of bed/[place] to call system/Physically safe environment - no spills, clutter or unnecessary equipment/Purposeful Proactive Rounding/Room/bathroom lighting operational, light cord in reach

## 2022-10-03 NOTE — OB PROVIDER H&P - NSHPSOCIALHISTORY_GEN_ALL_CORE
reports marijuana use prior to knowledge of pregnancy, none since.  Carlos a/t/other drug use reports marijuana use prior to knowledge of pregnancy, none since.  Carlos a/t/other drug use    FOB in [facility], grandmother to assist with care of baby.

## 2022-10-03 NOTE — BRIEF OPERATIVE NOTE - NSICDXBRIEFPREOP_GEN_ALL_CORE_FT
PRE-OP DIAGNOSIS:  Arrest of dilation, delivered, current hospitalization 03-Oct-2022 21:07:12  Jasimna Armando  Severe pre-eclampsia 03-Oct-2022 21:07:47  Jasmina Armando

## 2022-10-03 NOTE — CONSULT NOTE ADULT - ASSESSMENT
A/P: 28yo now P1 POD#0 s/p primary  section for arrest of dilation with an EBL of 800cc; with preeclampsia with severe features on magnesium for seizure prophylaxis, now with anuria and EVELIO   -recommend discontinuation of magnesium for seizure prophylaxis. Would recommend use of Keppra 500mg BID for seizure prophylaxis in the setting of anuria and increasing anuria    -would recommend continuing LR at 125cc/hr   -PELs q6 hours   -diet: clear liquid diet   -activity: bedrest with bathroom privileges   -strict I/Os   -neuro checks q2 hours   -additional recommendations to follow in the morning     Discussed with Dr. Matias. A/P: 26yo now P1 POD#0 s/p primary  section for arrest of dilation with an EBL of 800cc; with preeclampsia with severe features on magnesium for seizure prophylaxis, now with anuria and EVELIO     #EVELIO   -recommend discontinuation of magnesium for seizure prophylaxis. Would recommend use of Keppra 500mg BID for seizure prophylaxis in the setting of anuria and increasing creatinine  -strict I/Os   -would recommend completion of FeNa once patient produces urine   -continue indwelling urinary catheter for critical monitoring of urine output     #anuria   -would recommend fluid challenge with 1000cc LR bolus followed by 20mg IV lasix   -would recommend continuing LR at 125cc/hr   -strict I/Os  -continue indwelling urinary catheter for critical monitoring of urine output     #pre-eclampsia with severe features   -recommend discontinuation of magnesium for seizure prophylaxis. Would recommend use of Keppra 500mg BID for seizure prophylaxis in the setting of anuria and increasing creatinine  -strict I/Os  -PELs q6 hours   -diet: clear liquid diet   -activity: bedrest with bathroom privileges   -strict I/Os   -neuro checks q2 hours   -continue indwelling urinary catheter for critical monitoring of urine output     #elevated WBC   -would recommend a lactate level with next set of PELs to r/o sepsis in the setting of anuria       Dr. Matias at bedside  A/P: 26yo now P1 POD#0 s/p primary  section for arrest of dilation with an EBL of 800cc; with preeclampsia with severe features on magnesium for seizure prophylaxis, now with anuria and EVELIO     #EVELIO   -recommend discontinuation of magnesium for seizure prophylaxis. Would recommend use of Keppra 500mg BID for seizure prophylaxis in the setting of anuria and increasing creatinine  -strict I/Os   -would recommend completion of FeNa once patient produces urine   -continue indwelling urinary catheter for critical monitoring of urine output     #anuria   -would recommend fluid challenge with 1000cc LR bolus followed by 20mg IV lasix   -would recommend continuing LR at 125cc/hr   -strict I/Os  -continue indwelling urinary catheter for critical monitoring of urine output     #pre-eclampsia with severe features   -recommend discontinuation of magnesium for seizure prophylaxis. Would recommend use of Keppra 500mg BID for seizure prophylaxis in the setting of anuria and increasing creatinine  -strict I/Os  -PELs q4 hours   -diet: clear liquid diet   -activity: bedrest with bathroom privileges   -strict I/Os   -neuro checks q2 hours   -continue indwelling urinary catheter for critical monitoring of urine output     #elevated WBC   -would recommend a lactate level with next set of PELs to r/o sepsis in the setting of anuria       Dr. Matias at bedside

## 2022-10-03 NOTE — OB RN INTRAOPERATIVE NOTE - NSSELHIDDEN_OBGYN_ALL_OB_FT
[[email or web address]_OB_FT:[id number]],[[email or web address]_ALL_OB_FT:[id number]],[[email or web address]_OBGYN_ALL_OB_FT:[id number]]

## 2022-10-03 NOTE — OB PROVIDER H&P - NSRUBEOLARESULTS_OBGYN_ALL_OB
Patient seen by ISI Fields and FELICIA Dooley. Patient and R and L arm wounds stable with no s/s of infection. Patient BP elevated at first 189/103 and retook 15 min later at 140/90. Patient educated on BP and hypertension and risks/complications of uncontrolled BP. Patient to see family provider about BP next visit. Continued with plan of care to both R and L arm wounds. Patient brought in VAC supplies for visit. Patient to follow-up in clinic 3x/week for routine dressing changes and follow-up with provider in 2 weeks.     Writer has taken care of this patient today during their wound care visit  with the assistance of GLENDA Frye.        
immune

## 2022-10-03 NOTE — PROGRESS NOTE ADULT - ASSESSMENT
A/P: 27y  at 37w6d GA with preeclampsia with severe features s/p hydralazine IVP, with macrosomia (AC >97%ile), h/o trich and chlamydia this pregnancy with CASTILLO, on magnesium for seizure prophylaxis.  - next labs and magnesium level at 1200  - f/u BPs; Current BPs 139/71  - stricts I/Os  - continuous EFM and toco  - Seizure precautions  - Reevaluate in 2hours     Dr. Suresh and Dr. Palm aware

## 2022-10-03 NOTE — PROCEDURE NOTE - ADDITIONAL PROCEDURE DETAILS
Thorough discussion of patient's history, as indicated above.  Discussed risks of epidural, including PDPH, inadequate analgesia occasionally requiring epidural catheter replacement, bleeding, infection and spinal cord injury.  Patient expressed understanding of these risks, signed informed consent and wishes to proceed with epidural catheter insertion.  Lumbar epidural performed at L3-4. Standard ASA monitors including BP, pulse oximetry, FHR. Sterile gloves, chlorhexidine prep. 1% lidocaine for local infiltration. 17g Touhy. RAE to saline at 7.5cm.  27G Nader spinal needle inserted through epidural needle.  +CSF/Negative heme or paresthesias.  1cc of 0.25% bupivacaine injected easily.  Nader needle removed.  Epidural catheter threaded easily to 13cm. Touhy needle removed. Catheter secured in place. Aspiration negative for blood/CSF. Test dose consisting of 3ml 1.5% lidocaine with epinephrine was negative. Patient tolerated procedure, was hemodynamically stable throughout and did not complain of pain or paresthesias. T10 level bilaterally. Epidural infusion consisting of 250ml 0.0625% bupivacaine with fentanyl 2mcg/ml infusing at 10 mL/hour with patient controlled bolus of 5cc q 15 minutes as needed for pain. Thorough discussion of patient's history, as indicated above.  Discussed risks of epidural, including PDPH, inadequate analgesia occasionally requiring epidural catheter replacement, bleeding, infection and spinal cord injury.  Patient expressed understanding of these risks, signed informed consent and wishes to proceed with epidural catheter insertion.  Lumbar epidural performed at L3-4. Standard ASA monitors including BP, pulse oximetry, FHR. Sterile gloves, chlorhexidine prep. 1% lidocaine for local infiltration. 17g Touhy. RAE to saline at 7.5cm.  27G Nader spinal needle inserted through epidural needle.  +CSF/Negative heme or paresthesias.  1cc of 0.25% bupivacaine injected easily.  Nader needle removed.  Epidural catheter threaded easily to 13cm. Touhy needle removed. Catheter secured in place. Aspiration negative for blood/CSF. Test dose consisting of 3ml 1.5% lidocaine with epinephrine was negative. Patient tolerated procedure, was hemodynamically stable throughout and did not complain of pain or paresthesias. T10 level bilaterally. Epidural infusion consisting of 250ml 0.0625% bupivacaine with fentanyl 2mcg/ml infusing at 10 mL/hour with patient controlled bolus of 5cc q 15 minutes as needed for pain.    At 7:38pm patient went into OR with arrest of dilatation for a  with an existing , well working, epidural. Baby was delivered at 7:50pm, the procedure ended at 8:47pm and the patient was in the PACU by 8:58pm. Case was uneventful and will monitor Urine output by OB team

## 2022-10-03 NOTE — PROGRESS NOTE ADULT - SUBJECTIVE AND OBJECTIVE BOX
PGY 4 Note    Patient seen at bedside for evaluation of labor progression and magnesium check. Reports strong pain with contractions.  Denies headache, changes in vision, chest pain, SOB, RUQ/epigastric pain, N/V, fevers, chills, diarrhea, LE pain/swelling.    Vital Signs Last 24 Hrs  T(C): 36.8 (03 Oct 2022 16:27), Max: 37.2 (03 Oct 2022 05:26)  T(F): 98.24 (03 Oct 2022 16:27), Max: 98.9 (03 Oct 2022 05:26)  HR: 97 (03 Oct 2022 17:40) (67 - 120)  BP: 147/86 (03 Oct 2022 17:37) (125/65 - 197/104)  RR: 16 (03 Oct 2022 09:41) (16 - 16)  SpO2: 100% (03 Oct 2022 17:40) (93% - 100%)      EFM: 120/mod/+accel, cat I  TOCO: q5m  SVE: 5-6/90/-2 @1657    Labs:                        11.0   9.84  )-----------( 181      ( 03 Oct 2022 12:11 )             31.8     10-    138  |  102  |  <3<L>  ----------------------------<  83  3.3<L>   |  21  |  0.8    Ca    8.4      03 Oct 2022 12:11  Mg     4.1     10-    TPro  5.8<L>  /  Alb  3.0<L>  /  TBili  0.7  /  DBili  x   /  AST  23  /  ALT  11  /  AlkPhos  296<H>  10-    ABO RH Interpretation: B POS (10-03-22 @ 06:14)    Urinalysis Basic - ( 03 Oct 2022 08:10 )    Color: Karla / Appearance: Slightly Turbid / S.020 / pH: x  Gluc: x / Ketone: Trace  / Bili: Small / Urobili: 12 mg/dL   Blood: x / Protein: 100 mg/dL / Nitrite: Negative   Leuk Esterase: Large / RBC: 1 /HPF / WBC 82 /HPF   Sq Epi: x / Non Sq Epi: >27 /HPF / Bacteria: Moderate          Meds: ampicillin  IVPB 1 Gram(s) IV Intermittent every 4 hours  chlorhexidine 2% Cloths 1 Application(s) Topical once  citric acid/sodium citrate Solution 15 milliLiter(s) Oral every 6 hours  fentanyl (2 MICROgram(s)/mL) + bupivacaine 0.0625%  in 0.9% Sodium Chloride PCEA 250 milliLiter(s) Epidural PCA Continuous, started @0910  hydrALAZINE Injectable 10 milliGRAM(s) IV Push once  influenza   Vaccine 0.5 milliLiter(s) IntraMuscular once  labetalol Injectable 20 milliGRAM(s) IV Push once  lactated ringers Bolus 300 milliLiter(s) IV Bolus once  lactated ringers. 1000 milliLiter(s) IV Continuous <Continuous>  magnesium sulfate Infusion 2 Gm/Hr IV Continuous <Continuous>  oxytocin Infusion 333.333 milliUNIT(s)/Min IV Continuous <Continuous>  oxytocin Infusion. 2 milliUNIT(s)/Min IV Continuous <Continuous>  acetaminophen   IVPB .. 1000 milliGRAM(s) IV Intermittent once  ampicillin  IVPB 2 Gram(s) IV Intermittent once  famotidine Injectable 20 milliGRAM(s) IV Push once  hydrALAZINE Injectable 5 milliGRAM(s) IV Push once  hydrALAZINE Injectable 10 milliGRAM(s) IV Push once  magnesium sulfate  IVPB 4 Gram(s) IV Intermittent once  NIFEdipine IR 10 milliGRAM(s) Oral once  ondansetron Injectable 4 milliGRAM(s) IV Push once

## 2022-10-03 NOTE — PROGRESS NOTE ADULT - ASSESSMENT
A/P: 27y  at 37w6d GA with preeclampsia with severe features s/p hydralazine IVP, with macrosomia (AC >97%ile), h/o trich and chlamydia this pregnancy with CASTILLO, on magnesium for seizure prophylaxis, with category 2 tracing undergoing resuscitation.  - next labs and magnesium level at 1200  - f/u BPs; Current BPs 136/61  - stricts I/Os  - continuous EFM and toco  - Seizure precautions  - Reevaluate in 2hours     Dr. Suresh and Dr. Palm aware

## 2022-10-03 NOTE — OB PROVIDER DELIVERY SUMMARY - NSSELHIDDEN_OBGYN_ALL_OB_FT
[NS_[email or web address]_ALL_OB_FT:[id number]],[NS_DeliveryAssist1_OBGYN_ALL_OB_FT:[id number]],[NS_DeliveryRN_OBGYN_ALL_OB_FT:[id number]]

## 2022-10-03 NOTE — CHART NOTE - NSCHARTNOTEFT_GEN_A_CORE
PGY-4 Note:     Pt s/p primary C/S for arrest of dilation in the setting of severe pre-eclampsia. Per report received, minimal urine output was noticed prior to entering the OR for her primary  section. Report received from surgical team, surgery was uncomplicated and no concern for bladder injury. No urine output has been produced postoperatively.     S/p IVP 5mg of hydralazine at 2207 for consecutively elevated severe blood pressures.       Vital Signs Last 24 Hrs  T(C): 36.9 (03 Oct 2022 21:30), Max: 37.2 (03 Oct 2022 05:26)  T(F): 98.4 (03 Oct 2022 21:30), Max: 98.9 (03 Oct 2022 05:26)  HR: 101 (03 Oct 2022 23:13) (67 - 126)  BP: 140/87 (03 Oct 2022 23:08) (113/62 - 197/104)  RR: 16 (03 Oct 2022 09:41) (16 - 16)  SpO2: 100% (03 Oct 2022 23:08) (93% - 100%)    Physical Exam:   GA: AOX3, no apparent distress  Resp: CTAB  Cardio: RRR  Abd: soft, nontender, appropriately tender near Pfannenstiel incision; surgical dressing with scant serosanguinous drainage   Extr: no erythema or pain to palpation bilaterally; 2+ bilateral pitting edema to the level of the thighs        A/P: 28yo now P1 POD#0 s/p primary  section for arrest of dilation with an EBL of 800cc; with preeclampsia with severe features on magnesium for seizure prophylaxis, now with anuria   -STAT pre-eclamptic labs (CBC, CMP, uric acid, LDH, Mg)   -diet: clear liquid diet   -activity: bedrest with bathroom privileges   -strict I/Os   -neuro checks q2 hours   -case discussed with on-call with MFM attending regarding anuria. Dependent on the results of the STAT labs will discuss additional management of anuria    To be discussed with Dr. Palm. Discussed with Dr. Matias

## 2022-10-03 NOTE — OB PROVIDER H&P - ASSESSMENT
A/P: 28 yo  @37w6d, GBS pos, morbidly obese, severely elevated BPs, preeclampsia with severe features,    -admit l&d  -cont efm/toco  -clears/IVF  -vitals q15  -amp for GBS ppx  -IVP 5mg hydralazine  -magnesium sulfate for seizure ppx  -seizure precautions  -neuro checks q2hr  -admission labs, PELs, COVID, UDS  -plan for IOL    Dr. Campbell and Dr. rBown aware A/P: 28 yo  @37w6d, GBS pos, morbidly obese, suspected fetal macrosomia (AC>97%), severely elevated BPs, preeclampsia with severe features,    -admit l&d  -cont efm/toco  -clears/IVF  -vitals q15  -amp for GBS ppx  -IVP 5mg hydralazine  -magnesium sulfate for seizure ppx  -seizure precautions  -neuro checks q2hr  -admission labs, PELs, COVID, UDS  -plan for IOL    Dr. Campbell and Dr. Brown aware

## 2022-10-03 NOTE — BRIEF OPERATIVE NOTE - NSICDXBRIEFPOSTOP_GEN_ALL_CORE_FT
POST-OP DIAGNOSIS:  Severe pre-eclampsia 03-Oct-2022 21:07:54  Jasmina Armando  Arrest of dilation, delivered, current hospitalization 03-Oct-2022 21:07:51  Jasmina Armando

## 2022-10-03 NOTE — OB PROVIDER H&P - NSHPPHYSICALEXAM_GEN_ALL_CORE
Vital Signs Last 24 Hrs  T(C): 37.2 (03 Oct 2022 05:26), Max: 37.2 (03 Oct 2022 05:26)  T(F): 98.9 (03 Oct 2022 05:26), Max: 98.9 (03 Oct 2022 05:26)  HR: 80 (03 Oct 2022 06:00) (67 - 82)  BP: 157/87 (03 Oct 2022 05:56) (157/87 - 197/104)  SpO2: 100% (03 Oct 2022 06:00) (100% - 100%)    Gen: NAD, sitting comfortably  Abd: Gravid, soft, NT, palpable ctx  SVE: 1.5/30/-3, vtx, intact  EFM: 145/mod/+accels  [place]: none  Sono: Vital Signs Last 24 Hrs  T(C): 37.2 (03 Oct 2022 05:26), Max: 37.2 (03 Oct 2022 05:26)  T(F): 98.9 (03 Oct 2022 05:26), Max: 98.9 (03 Oct 2022 05:26)  HR: 80 (03 Oct 2022 06:00) (67 - 82)  BP: 157/87 (03 Oct 2022 05:56) (157/87 - 197/104)  SpO2: 100% (03 Oct 2022 06:00) (100% - 100%)    Gen: NAD, sitting comfortably  Abd: Gravid, soft, NT, palpable ctx  SVE: 1.5/30/-3, vtx, intact  EFM: 145/mod/+accels  [place]: none  Sono: ?? Vital Signs Last 24 Hrs  T(C): 37.2 (03 Oct 2022 05:26), Max: 37.2 (03 Oct 2022 05:26)  T(F): 98.9 (03 Oct 2022 05:26), Max: 98.9 (03 Oct 2022 05:26)  HR: 80 (03 Oct 2022 06:00) (67 - 82)  BP: 157/87 (03 Oct 2022 05:56) (157/87 - 197/104)  SpO2: 100% (03 Oct 2022 06:00) (100% - 100%)    Gen: NAD, sitting comfortably  Abd: Gravid, soft, NT, palpable ctx  SVE: 1.5/30/-3, vtx, intact  EFM: 145/mod/+accels  [place]: none  Sono: cephalic

## 2022-10-03 NOTE — CHART NOTE - NSCHARTNOTEFT_GEN_A_CORE
PGY4 Note    Patient with severe range BPs as follows:    173/87 @0655 --> 196/117 @0716    Given hydralazine 10mg IV push.  Remains asymptomatic apart from strong contractions.      Dr. Barreto aware. PGY4 Note    Patient with severe range BPs as follows:    173/87 @0655 --> 196/117 @0716    Given hydralazine 10mg IV push.  Remains asymptomatic apart from strong contractions.  Offered epidural but patient declined at this time. Ordered IV tylenol.   Will repeat BP in 20 minutes.      Dr. Barreto aware.

## 2022-10-03 NOTE — CONSULT NOTE ADULT - ATTENDING COMMENTS
[facility] Staff    I saw and evaluated Ms. JANET HUTCHISON      General:  Ms. JANET HUTCHISON is lying in bed.  She appears comfortable.    Vital Signs Last 24 Hrs  T(C): 36.9 (03 Oct 2022 21:30), Max: 37.2 (03 Oct 2022 05:26)  T(F): 98.4 (03 Oct 2022 21:30), Max: 98.9 (03 Oct 2022 05:26)  HR: 108 (04 Oct 2022 00:33) (67 - 126)  BP: 160/84 (04 Oct 2022 00:29) (113/62 - 197/104)  BP(mean): --  RR: 16 (03 Oct 2022 09:41) (16 - 16)  SpO2: 99% (04 Oct 2022 00:33) (93% - 100%)    Cardiovascular:  Normal S1 S2.  No murmurs.  Pulmonary:  Clear to auscultation bilaterally.  No wheezing.  Abdomen:  Soft, nontender, nondistended, no rebound, no guarding. Incision is covered with a dressing  Extremities:  Nontender calves.  Neurology:  Deep tendon reflexes 2+ bilaterally.    Ms. Hutchison is a 26yo now P1 POD#0 s/p primary  section for arrest of dilation with an EBL of 800cc; with preeclampsia with severe features on magnesium for seizure prophylaxis, with oliguria/ now with anuria.    The differential diagnosis for the anuria is likely acute kidney injury given the increase in creatinine and the decrease in GFR.  Other possibilities include hypovolemia.  This is less likely given the stable pulse similar to prior to delivery and the stable hematocrit (actually an increase perhaps due to the preeclampsia).  Of note her urine output was low even prior to delivery.  Sepsis is also on the differential diagnosis in the context of an elevated WBC and anuria.  However Ms. Hutchison does not have mental status changes and an elevated WBC can be seen with  delivery.    Recommendations   - 1000 cc bolus and 20 mg IV Lasix.  If no improvement consult Nephrology overnight.  -  Echocardiogram.  -  D/c magnesium sulfate and start IV Keppra for seizure prophylaxis  -  Monitor labs.  Check lactate with the next lab  -  Check Urine FeNa  -  Monitor vital signs.  -  Strict Is and Os.    Noel Matias MD [facility] Staff    I saw and evaluated Ms. JANET HUTCHISON      General:  Ms. JANET HUTCHISON is lying in bed.  She appears comfortable.    Vital Signs Last 24 Hrs  T(C): 36.9 (03 Oct 2022 21:30), Max: 37.2 (03 Oct 2022 05:26)  T(F): 98.4 (03 Oct 2022 21:30), Max: 98.9 (03 Oct 2022 05:26)  HR: 108 (04 Oct 2022 00:33) (67 - 126)  BP: 160/84 (04 Oct 2022 00:29) (113/62 - 197/104)  BP(mean): --  RR: 16 (03 Oct 2022 09:41) (16 - 16)  SpO2: 99% (04 Oct 2022 00:33) (93% - 100%)    Cardiovascular:  Normal S1 S2.  No murmurs.  Pulmonary:  Clear to auscultation bilaterally.  No wheezing.  Abdomen:  Soft, nontender, nondistended, no rebound, no guarding. Incision is covered with a dressing  Extremities:  Nontender calves.  Neurology:  Deep tendon reflexes 2+ bilaterally.    Ms. Hutchison is a 28yo now P1 POD#0 s/p primary  section for arrest of dilation with an EBL of 800cc; with preeclampsia with severe features on magnesium for seizure prophylaxis, with oliguria/ now with anuria.    The differential diagnosis for the anuria is likely acute kidney injury given the increase in creatinine and the decrease in GFR.  Other possibilities include hypovolemia.  This is less likely given the stable pulse similar to prior to delivery and the stable hematocrit (actually an increase perhaps due to the preeclampsia).  Of note her urine output was low even prior to delivery.  Sepsis is also on the differential diagnosis in the context of an elevated WBC and anuria.  However Ms. Hutchison does not have mental status changes and an elevated WBC can be seen with  delivery.    Recommendations   - 1000 cc bolus and 20 mg IV Lasix.  If no improvement consult Nephrology overnight.  -  Echocardiogram.  -  D/c magnesium sulfate and start IV Keppra for seizure prophylaxis  -  Monitor labs.  Check lactate with the next lab draw.  -  Check Urine FeNa  -  Monitor vital signs.  -  Strict Is and Os.  - If the suspicion for hypovolemia as a cause for the anuria increases recommend CT scan.    Noel Matias MD [facility] Staff    I saw and evaluated Ms. JANET HUTCHISON      General:  Ms. JANET HUTCHISON is lying in bed.  She appears comfortable.    Vital Signs Last 24 Hrs  T(C): 36.9 (03 Oct 2022 21:30), Max: 37.2 (03 Oct 2022 05:26)  T(F): 98.4 (03 Oct 2022 21:30), Max: 98.9 (03 Oct 2022 05:26)  HR: 108 (04 Oct 2022 00:33) (67 - 126)  BP: 160/84 (04 Oct 2022 00:29) (113/62 - 197/104)  BP(mean): --  RR: 16 (03 Oct 2022 09:41) (16 - 16)  SpO2: 99% (04 Oct 2022 00:33) (93% - 100%)    Cardiovascular:  Normal S1 S2.  No murmurs.  Pulmonary:  Clear to auscultation bilaterally.  No wheezing.  Abdomen:  Soft, nontender, nondistended, no rebound, no guarding. Incision is covered with a dressing  Extremities:  Nontender calves.  Neurology:  Deep tendon reflexes 2+ bilaterally.    Ms. Hutchison is a 28yo now P1 POD#0 s/p primary  section for arrest of dilation with an EBL of 800cc; with preeclampsia with severe features on magnesium for seizure prophylaxis, with oliguria/ now with anuria.    The differential diagnosis for the anuria is likely acute kidney injury given the increase in creatinine and the decrease in GFR.  Other possibilities include hypovolemia.  This is less likely given the stable pulse similar to prior to delivery and the stable hematocrit (actually an increase perhaps due to the preeclampsia).  Of note her urine output was low even prior to delivery.  Sepsis is also on the differential diagnosis in the context of an elevated WBC and anuria.  However Ms. Hutchison does not have mental status changes and an elevated WBC can be seen with  delivery.    Recommendations   - 1000 cc bolus and 20 mg IV Lasix.  If no improvement consult Nephrology overnight.  -  Echocardiogram.  -  D/c magnesium sulfate and start IV Keppra for seizure prophylaxis  -  Monitor labs.  Check lactate with the next lab draw.  -  Check Urine FeNa  -  Monitor vital signs and treat blood pressures as indicated.  -  Strict Is and Os.  -  If the suspicion for hypovolemia as a cause for the anuria increases recommend CT scan.    Noel Matias MD

## 2022-10-03 NOTE — OB RN PATIENT PROFILE - HEART RATE (BEATS/MIN)
Patient called and wanted to know if a possible side effect of stopping her lexapro could be not getting her period  She stated she is 2 weeks late  She did take a pregnancy test which was negative  92

## 2022-10-03 NOTE — OB RN PATIENT PROFILE - FALL HARM RISK - UNIVERSAL INTERVENTIONS
Bed in lowest position, wheels locked, appropriate side rails in place/Call bell, personal items and telephone in reach/Instruct patient to call for assistance before getting out of bed or chair/Non-slip footwear when patient is out of bed/[place] to call system/Physically safe environment - no spills, clutter or unnecessary equipment/Purposeful Proactive Rounding/Room/bathroom lighting operational, light cord in reach

## 2022-10-03 NOTE — OB PROVIDER H&P - HISTORY OF PRESENT ILLNESS
28 y/o  at 32w0d, EDD1, dated by LMP c/w first trimester sonogram, presents for back pain and vaginal spotting. On presentation to L&D, pt had back to back severe range BPs, to 194/116. Pt denies HA, vision change, CP, SOB, RUQ pain or worsening lower leg swelling. Denies any h/o HTN or elevated BPs in this pregnancy. Reports +FM, denies LOF. Pregnancy complicated by constipation, morbid obesity, . GBS positive 26 y/o  at 32w0d, EDD1, dated by LMP c/w first trimester sonogram, presents for back pain and vaginal spotting. On presentation to L&D, pt had back to back severe range BPs, to 194/116. Pt denies HA, vision change, CP, SOB, RUQ pain or worsening lower leg swelling. Denies any h/o HTN or elevated BPs in this pregnancy. Reports +FM, denies LOF. Pregnancy complicated by constipation, morbid obesity, . Suspected fetal macrosomia, AC >97%il on 34w sonogram. GBS positive. 26 y/o  at 37w6d, EDD1, dated by LMP c/w first trimester sonogram, presents for back pain and vaginal spotting. On presentation to L&D, pt had back to back severe range BPs, to 194/116. Pt denies HA, vision change, CP, SOB, RUQ pain. Mother of patient reports pt has had 3-4 days of worsening bilateral lower leg swelling. Denies any h/o HTN or elevated BPs in this pregnancy. Reports +FM, denies LOF. Pregnancy complicated by constipation, morbid obesity, . Suspected fetal macrosomia, AC >97%il on 34w sonogram. GBS positive.

## 2022-10-03 NOTE — CHART NOTE - NSCHARTNOTEFT_GEN_A_CORE
PGY2 Note    Patient seen at bedside due to sustained severe range blood pressures. 5mg IVP hydralazine administered. Will repeat blood pressure in 20 minutes per protocol.    Patient has met criteria for preeclampsia with severe features by blood pressure criteria. Will begin magnesium for seizure prophylaxis    Dr. Campbell and Dr. Brown aware

## 2022-10-03 NOTE — OB RN DELIVERY SUMMARY - NSSELHIDDEN_OBGYN_ALL_OB_FT
[NS_DeliveryAttending1_OBGYN_ALL_OB_FT:[id number]],[NS_DeliveryAssist1_OBGYN_ALL_OB_FT:[id number]],[NS_DeliveryRN_OBGYN_ALL_OB_FT:[id number]] [NS_DeliveryAttending1_OBGYN_ALL_OB_FT:[id number]],[NS_DeliveryAssist1_OBGYN_ALL_OB_FT:[id number]=],[NS_DeliveryRN_OBGYN_ALL_OB_FT:[id number]]

## 2022-10-03 NOTE — PROGRESS NOTE ADULT - SUBJECTIVE AND OBJECTIVE BOX
PGY1 Magnesium Note     Subjective:   Pt evaluated at bedside for recurrent late decels and magnesium check. She denies headache, vision changes, dizziness, SOB, chest pain, RUQ/epigastric pain.    Objective:   Vital Signs Last 24 Hrs  T(C): 36.7 (03 Oct 2022 09:41), Max: 37.2 (03 Oct 2022 05:26)  T(F): 98.06 (03 Oct 2022 07:14), Max: 98.9 (03 Oct 2022 05:26)  HR: 92 (03 Oct 2022 11:35) (67 - 120)  BP: 136/61 (03 Oct 2022 11:27) (126/70 - 197/104)  RR: 16 (03 Oct 2022 09:41) (16 - 16)  SpO2: 100% (03 Oct 2022 11:35) (98% - 100%)    10-03-22 @ 07:01  -  10-03-22 @ 10:06  --------------------------------------------------------  IN: 375 mL / OUT: 0 mL / NET: 375 mL    Gen: NAD, AAOx3  CV: S1S2, RRR, no M/R/G  Pulm: CTAB, no rhonchi or rales or wheezing  Ext: no calf tenderness or edema SCDs in place  Neuro: 2+ DTR bilaterally  Abd: soft, gravid, nontender, no rebound or guarding, no epigastric tenderness    EFM: 140 bpm/moderate variability/+accels  [place]: q3min  SVE: deferred, last exam 3/80/-2 by Dr. Armando @1001    Medications:  hydralazine IVP: 5 mg 10/3 @0618  mag: started 10/3 @0630  amp: first dose 10/3 @0645    Labs:                         11.2   7.52  )-----------( 183      ( 03 Oct 2022 06:14 )             32.2   10-03    139  |  104  |  <3<L>  ----------------------------<  70  3.4<L>   |  24  |  0.7    Ca    8.3<L>      03 Oct 2022 06:14    TPro  5.7<L>  /  Alb  3.0<L>  /  TBili  0.6  /  DBili  x   /  AST  22  /  ALT  11  /  AlkPhos  294<H>  10-03      Urinalysis Basic - ( 03 Oct 2022 08:10 )    Color: Karla / Appearance: Slightly Turbid / S.020 / pH: x  Gluc: x / Ketone: Trace  / Bili: Small / Urobili: 12 mg/dL   Blood: x / Protein: 100 mg/dL / Nitrite: Negative   Leuk Esterase: Large / RBC: x / WBC x   Sq Epi: x / Non Sq Epi: x / Bacteria: x

## 2022-10-03 NOTE — PROGRESS NOTE ADULT - SUBJECTIVE AND OBJECTIVE BOX
PGY3 Magnesium Note     Subjective:   Pt evaluated at bedside for magnesium check. She denies headache, vision changes, dizziness, SOB, chest pain, RUQ/epigastric pain.    Objective:   Vital signs: T(F): 98.06 (10-03-22 @ 07:14), Max: 98.9 (10-03-22 @ 05:26)  HR: 90 (10-03-22 @ 15:30) (67 - 120)  BP: 135/69 (10-03-22 @ 15:26) (125/65 - 197/104)  RR: 16 (10-03-22 @ 09:41) (16 - 16)  SpO2: 100% (10-03-22 @ 15:30) (93% - 100%)    10-02-22 @ 07:01  -  10-03-22 @ 07:00  --------------------------------------------------------  IN: 125 mL / OUT: 0 mL / NET: 125 mL    10-03-22 @ 07:01  -  10-03-22 @ 15:34  --------------------------------------------------------  IN: 2100 mL / OUT: 70 mL / NET: 2030 mL    (9799-2845) 5cc    Gen: NAD, AAOx3  CV: S1S2, RRR, no M/R/G  Pulm: CTAB, no rhonchi or rales or wheezing  Ext: no calf tenderness or edema SCDs in place  Neuro: 2+ DTR bilaterally  Abd: soft, GRAVId, nontender, no rebound or guarding, no epigastric tenderness    EFM: 120bpm/moderate variability/+accelerations/no decelerations  [place]: irregular 50montevideo units  SVE:  5.5/90/-1 per Padmini [place]    Medications:    acetaminophen   IVPB ..: 400 (10-03-22 @ 07:48)  ampicillin  IVPB: 200 (10-03-22 @ 06:49)  ampicillin  IVPB: 108 (10-03-22 @ 14:51),  108 (10-03-22 @ 10:47)  citric acid/sodium citrate Solution: 15 (10-03-22 @ 09:41)  famotidine Injectable: 20 (10-03-22 @ 13:30)  hydrALAZINE Injectable: 5 (10-03-22 @ 06:14)  hydrALAZINE Injectable: 10 (10-03-22 @ 07:31)  magnesium sulfate  IVPB: 300 (10-03-22 @ 06:30)  magnesium sulfate Infusion: 50 (10-03-22 @ 05:48)  ondansetron Injectable: 4 (10-03-22 @ 15:12)  oxytocin Infusion.: 2 (10-03-22 @ 10:42) now off      Labs:                         11.0   9.84  )-----------( 181      ( 03 Oct 2022 12:11 )             31.8   10    138  |  102  |  <3<L>  ----------------------------<  83  3.3<L>   |  21  |  0.8    Ca    8.4      03 Oct 2022 12:11  Mg     4.1     10-03    TPro  5.8<L>  /  Alb  3.0<L>  /  TBili  0.7  /  DBili  x   /  AST  23  /  ALT  11  /  AlkPhos  296<H>  10      Urinalysis Basic - ( 03 Oct 2022 08:10 )    Color: Karla / Appearance: Slightly Turbid / S.020 / pH: x  Gluc: x / Ketone: Trace  / Bili: Small / Urobili: 12 mg/dL   Blood: x / Protein: 100 mg/dL / Nitrite: Negative   Leuk Esterase: Large / RBC: 1 /HPF / WBC 82 /HPF   Sq Epi: x / Non Sq Epi: >27 /HPF / Bacteria: Moderate           PGY3 Magnesium Note     Subjective:   Pt evaluated at bedside for magnesium check. She denies headache, vision changes, dizziness, SOB, chest pain, RUQ/epigastric pain.    Objective:   Vital signs: T(F): 98.06 (10-03-22 @ 07:14), Max: 98.9 (10-03-22 @ 05:26)  HR: 90 (10-03-22 @ 15:30) (67 - 120)  BP: 135/69 (10-03-22 @ 15:26) (125/65 - 197/104)  RR: 16 (10-03-22 @ 09:41) (16 - 16)  SpO2: 100% (10-03-22 @ 15:30) (93% - 100%)    10-02-22 @ 07:01  -  10-03-22 @ 07:00  --------------------------------------------------------  IN: 125 mL / OUT: 0 mL / NET: 125 mL    10-03-22 @ 07:01  -  10-03-22 @ 15:34  --------------------------------------------------------  IN: 2100 mL / OUT: 70 mL / NET: 2030 mL    UO([id number]) 5cc    Gen: NAD, AAOx3  CV: S1S2, RRR, no M/R/G  Pulm: CTAB, no rhonchi or rales or wheezing  Ext: no calf tenderness or edema SCDs in place  Neuro: 2+ DTR bilaterally  Abd: soft, GRAVId, nontender, no rebound or guarding, no epigastric tenderness    EFM: 120bpm/moderate variability/+accelerations/no decelerations  [place]: irregular 50montevideo units  SVE:  5.5/90/-1 per Padmini [place]    Medications:    acetaminophen   IVPB ..: 400 (10-03-22 @ 07:48)  ampicillin  IVPB: 200 (10-03-22 @ 06:49)  ampicillin  IVPB: 108 (10-03-22 @ 14:51),  108 (10-03-22 @ 10:47)  citric acid/sodium citrate Solution: 15 (10-03-22 @ 09:41)  famotidine Injectable: 20 (10-03-22 @ 13:30)  hydrALAZINE Injectable: 5 (10-03-22 @ 06:14)  hydrALAZINE Injectable: 10 (10-03-22 @ 07:31)  magnesium sulfate  IVPB: 300 (10-03-22 @ 06:30)  magnesium sulfate Infusion: 50 (10-03-22 @ 05:48)  ondansetron Injectable: 4 (10-03-22 @ 15:12)  oxytocin Infusion.: 2 (10-03-22 @ 10:42) now off      Labs:                         11.0   9.84  )-----------( 181      ( 03 Oct 2022 12:11 )             31.8   10-03    138  |  102  |  <3<L>  ----------------------------<  83  3.3<L>   |  21  |  0.8    Ca    8.4      03 Oct 2022 12:11  Mg     4.1     10-03    TPro  5.8<L>  /  Alb  3.0<L>  /  TBili  0.7  /  DBili  x   /  AST  23  /  ALT  11  /  AlkPhos  296<H>  10-03      Urinalysis Basic - ( 03 Oct 2022 08:10 )    Color: Karla / Appearance: Slightly Turbid / S.020 / pH: x  Gluc: x / Ketone: Trace  / Bili: Small / Urobili: 12 mg/dL   Blood: x / Protein: 100 mg/dL / Nitrite: Negative   Leuk Esterase: Large / RBC: 1 /HPF / WBC 82 /HPF   Sq Epi: x / Non Sq Epi: >27 /HPF / Bacteria: Moderate

## 2022-10-03 NOTE — OB PROVIDER H&P - NSHPLABSRESULTS_GEN_ALL_CORE
Type and Screen: B pos  Antibody screen: neg   Rubella: immune   RPR: neg  HbSAg: neg  HIV: NR    Second Trimester:  1 hour Glucola: 114    sonogram  3/3: 6w3d, IUP  5/5: 16w4d, 168g, ant placenta  6/28: 24w0d, 686g (51.8%), ant placenta, no gross morphological abnormalities seen Type and Screen: B pos  Antibody screen: neg   Rubella: immune   RPR: neg  HbSAg: neg  HIV: NR    Second Trimester:  1 hour Glucola: 114    9/13  GBS pos    sonogram  3/3: 6w3d, IUP  5/5: 16w4d, 168g, ant placenta  6/28: 24w0d, 686g (51.8%), ant placenta, no gross morphological abnormalities seen  34w, EFW 2864g (79.3%), AC 97.6%

## 2022-10-03 NOTE — PROGRESS NOTE ADULT - SUBJECTIVE AND OBJECTIVE BOX
PGY3 Note    Patient seen at bedside. No complaints at the moment.    T(F): 98.06 (10-03 @ 07:14), Max: 98.9 (10-03 @ 05:26)  HR: 120 (10-03 @ 10:00)  BP: 147/70 (10-03 @ 09:53) (141/69 - 197/104)  RR: 16 (10-03 @ 09:41)  EFM: 135bpm/moderate variability/+accelerations/occational prolonged decelerations  TOCO: q3mins 200montevideo units, IUPC in place  SVE: 380/-2 vtx ruptured ISE in place    Medications:  acetaminophen   IVPB ..: 400 (10-03 @ 07:48)  ampicillin  IVPB: 200 (10-03 @ 06:49)  citric acid/sodium citrate Solution: 15 (10-03 @ 09:41)  hydrALAZINE Injectable: 5 (10-03 @ 06:14)  hydrALAZINE Injectable: 10 (10-03 @ 07:31)  magnesium sulfate  IVPB: 300 (10-03 @ 06:30)  magnesium sulfate Infusion: 50 (10-03 @ 05:48)      Labs:                        11.2   7.52  )-----------( 183      ( 03 Oct 2022 06:14 )             32.2     10-03    139  |  104  |  <3<L>  ----------------------------<  70  3.4<L>   |  24  |  0.7    Ca    8.3<L>      03 Oct 2022 06:14    TPro  5.7<L>  /  Alb  3.0<L>  /  TBili  0.6  /  DBili  x   /  AST  22  /  ALT  11  /  AlkPhos  294<H>  10-03    Prenatal Syphilis Test: Nonreact (10-03 @ 06:14)  Rapid HIV-1/2 Antibody: Nonreact (10-03 @ 06:14)  Uric Acid, Serum: 6.3 mg/dL (10-03 @ 06:14)  Antibody Screen: NEG (10-03-22 @ 06:14)    Urinalysis Basic - ( 03 Oct 2022 08:10 )    Color: Karla / Appearance: Slightly Turbid / S.020 / pH: x  Gluc: x / Ketone: Trace  / Bili: Small / Urobili: 12 mg/dL   Blood: x / Protein: 100 mg/dL / Nitrite: Negative   Leuk Esterase: Large / RBC: x / WBC x   Sq Epi: x / Non Sq Epi: x / Bacteria: x

## 2022-10-03 NOTE — PROGRESS NOTE ADULT - SUBJECTIVE AND OBJECTIVE BOX
PGY 4 Note    Patient seen at bedside for evaluation of labor progression. Comfortable s/p epidural. Denies headache, changes in vision, chest pain, SOB, RUQ/epigastric pain, N/V, fevers, chills, diarrhea, LE pain/swelling.    Vital Signs Last 24 Hrs  T(C): 36.8 (03 Oct 2022 16:27), Max: 37.2 (03 Oct 2022 05:26)  T(F): 98.24 (03 Oct 2022 16:27), Max: 98.9 (03 Oct 2022 05:26)  HR: 108 (03 Oct 2022 19:21) (67 - 126)  BP: 119/69 (03 Oct 2022 19:21) (113/62 - 197/104)  RR: 16 (03 Oct 2022 09:41) (16 - 16)  SpO2: 99% (03 Oct 2022 19:20) (93% - 100%)      EFM: 120/mod/+accel, cat I  TOCO: q5m  SVE: 5.5/90/-1 @1835    Labs:                        9.3    10.42 )-----------( 160      ( 03 Oct 2022 18:08 )             27.4     10-    136  |  102  |  <3<L>  ----------------------------<  71  3.9   |  19  |  0.9    Ca    7.5<L>      03 Oct 2022 18:08  Mg     4.7     10-    TPro  4.4<L>  /  Alb  2.5<L>  /  TBili  0.5  /  DBili  x   /  AST  17  /  ALT  8   /  AlkPhos  232<H>  10-    ABO RH Interpretation: B POS (10-03-22 @ 06:14)    Urinalysis Basic - ( 03 Oct 2022 08:10 )    Color: Karla / Appearance: Slightly Turbid / S.020 / pH: x  Gluc: x / Ketone: Trace  / Bili: Small / Urobili: 12 mg/dL   Blood: x / Protein: 100 mg/dL / Nitrite: Negative   Leuk Esterase: Large / RBC: 1 /HPF / WBC 82 /HPF   Sq Epi: x / Non Sq Epi: >27 /HPF / Bacteria: Moderate        Meds: ampicillin  IVPB 1 Gram(s) IV Intermittent every 4 hours  azithromycin  IVPB 500 milliGRAM(s) IV Intermittent once  ceFAZolin   IVPB 1000 milliGRAM(s) IV Intermittent once  chlorhexidine 2% Cloths 1 Application(s) Topical once  citric acid/sodium citrate Solution 15 milliLiter(s) Oral every 6 hours  citric acid/sodium citrate Solution 30 milliLiter(s) Oral once  fentanyl (2 MICROgram(s)/mL) + bupivacaine 0.0625%  in 0.9% Sodium Chloride PCEA 250 milliLiter(s) Epidural PCA Continuous  hydrALAZINE Injectable 10 milliGRAM(s) IV Push once  influenza   Vaccine 0.5 milliLiter(s) IntraMuscular once  labetalol Injectable 20 milliGRAM(s) IV Push once  lactated ringers Bolus 300 milliLiter(s) IV Bolus once  lactated ringers. 1000 milliLiter(s) IV Continuous <Continuous>  magnesium sulfate Infusion 2 Gm/Hr IV Continuous <Continuous>  metoclopramide Injectable 10 milliGRAM(s) IV Push once  oxytocin Infusion 333.333 milliUNIT(s)/Min IV Continuous <Continuous>  oxytocin Infusion. 2 milliUNIT(s)/Min IV Continuous <Continuous>  acetaminophen   IVPB .. 1000 milliGRAM(s) IV Intermittent once  ampicillin  IVPB 2 Gram(s) IV Intermittent once  ceFAZolin   IVPB 2000 milliGRAM(s) IV Intermittent once  famotidine Injectable 20 milliGRAM(s) IV Push once  hydrALAZINE Injectable 5 milliGRAM(s) IV Push once  hydrALAZINE Injectable 10 milliGRAM(s) IV Push once  magnesium sulfate  IVPB 4 Gram(s) IV Intermittent once  NIFEdipine IR 10 milliGRAM(s) Oral once  ondansetron Injectable 4 milliGRAM(s) IV Push once

## 2022-10-03 NOTE — CHART NOTE - NSCHARTNOTEFT_GEN_A_CORE
PGY 3 note    Patient seen and evaluated at the bedside for late decelerations. Patient repositions from high fowlers to left tilt, oxygen administered and pitocin discontinued    T(C): 36.7 (10-03-22 @ 09:41), Max: 37.2 (10-03-22 @ 05:26)  HR: 108 (10-03-22 @ 12:45) (67 - 120)  BP: 181/92 (10-03-22 @ 12:41) (126/70 - 197/104)  RR: 16 (10-03-22 @ 09:41) (16 - 16)  SpO2: 100% (10-03-22 @ 12:45) (98% - 100%)  BMI (kg/m2): 47.5 (10-03-22 @ 09:46)    Gen: A+OX3. NAD  Abd: Soft, Nontender. Gravid.  FHR: 150bpm/moderate variability/+accelerations/occaisional late decelerations  TOCO: 145montevideo units  SVE: 4/90/-2 vtx ruptured    A/P: 26 yo  @37w6d, GBS pos, morbidly obese, suspected fetal macrosomia (AC>97%), preeclampsia with severe features on magnesium for seizure prophylaxis, in early labor with category 2 tracing undergoing resuscitation  -Continue to resuscitate as needed  -will resume pitocin when safe to do so  -Pain management prn  -Continuous EFM/toco  -F/u UrpRcr  -Reevaluate     Dr. Palm to be made aware

## 2022-10-04 ENCOUNTER — APPOINTMENT (OUTPATIENT)
Dept: ANTEPARTUM | Facility: CLINIC | Age: 27
End: 2022-10-04

## 2022-10-04 LAB
ALBUMIN SERPL ELPH-MCNC: 2.1 G/DL — LOW (ref 3.5–5.2)
ALBUMIN SERPL ELPH-MCNC: 2.4 G/DL — LOW (ref 3.5–5.2)
ALBUMIN SERPL ELPH-MCNC: 2.7 G/DL — LOW (ref 3.5–5.2)
ALP SERPL-CCNC: 224 U/L — HIGH (ref 30–115)
ALP SERPL-CCNC: 255 U/L — HIGH (ref 30–115)
ALP SERPL-CCNC: 256 U/L — HIGH (ref 30–115)
ALT FLD-CCNC: 7 U/L — SIGNIFICANT CHANGE UP (ref 0–41)
ALT FLD-CCNC: 9 U/L — SIGNIFICANT CHANGE UP (ref 0–41)
ALT FLD-CCNC: 9 U/L — SIGNIFICANT CHANGE UP (ref 0–41)
ANION GAP SERPL CALC-SCNC: 14 MMOL/L — SIGNIFICANT CHANGE UP (ref 7–14)
ANION GAP SERPL CALC-SCNC: 14 MMOL/L — SIGNIFICANT CHANGE UP (ref 7–14)
ANION GAP SERPL CALC-SCNC: 19 MMOL/L — HIGH (ref 7–14)
APTT BLD: 24.4 SEC — LOW (ref 27–39.2)
APTT BLD: 30.9 SEC — SIGNIFICANT CHANGE UP (ref 27–39.2)
APTT BLD: 33.6 SEC — SIGNIFICANT CHANGE UP (ref 27–39.2)
AST SERPL-CCNC: 19 U/L — SIGNIFICANT CHANGE UP (ref 0–41)
AST SERPL-CCNC: 23 U/L — SIGNIFICANT CHANGE UP (ref 0–41)
AST SERPL-CCNC: 29 U/L — SIGNIFICANT CHANGE UP (ref 0–41)
BASOPHILS # BLD AUTO: 0.03 K/UL — SIGNIFICANT CHANGE UP (ref 0–0.2)
BASOPHILS # BLD AUTO: 0.03 K/UL — SIGNIFICANT CHANGE UP (ref 0–0.2)
BASOPHILS # BLD AUTO: 0.04 K/UL — SIGNIFICANT CHANGE UP (ref 0–0.2)
BASOPHILS NFR BLD AUTO: 0.2 % — SIGNIFICANT CHANGE UP (ref 0–1)
BILIRUB SERPL-MCNC: 0.3 MG/DL — SIGNIFICANT CHANGE UP (ref 0.2–1.2)
BILIRUB SERPL-MCNC: 0.5 MG/DL — SIGNIFICANT CHANGE UP (ref 0.2–1.2)
BILIRUB SERPL-MCNC: 0.5 MG/DL — SIGNIFICANT CHANGE UP (ref 0.2–1.2)
BUN SERPL-MCNC: 4 MG/DL — LOW (ref 10–20)
BUN SERPL-MCNC: 5 MG/DL — LOW (ref 10–20)
BUN SERPL-MCNC: 6 MG/DL — LOW (ref 10–20)
BUPRENORPHINE IN-HOUSE INTERPRETATION: NEGATIVE — SIGNIFICANT CHANGE UP
BUPRENORPHINE, UR RESULT: NEGATIVE NG/ML — SIGNIFICANT CHANGE UP
CALCIUM SERPL-MCNC: 7.2 MG/DL — LOW (ref 8.4–10.4)
CALCIUM SERPL-MCNC: 7.6 MG/DL — LOW (ref 8.4–10.5)
CALCIUM SERPL-MCNC: 7.9 MG/DL — LOW (ref 8.4–10.5)
CHLORIDE SERPL-SCNC: 100 MMOL/L — SIGNIFICANT CHANGE UP (ref 98–110)
CHLORIDE SERPL-SCNC: 100 MMOL/L — SIGNIFICANT CHANGE UP (ref 98–110)
CHLORIDE SERPL-SCNC: 98 MMOL/L — SIGNIFICANT CHANGE UP (ref 98–110)
CK SERPL-CCNC: 370 U/L — HIGH (ref 0–225)
CO2 SERPL-SCNC: 15 MMOL/L — LOW (ref 17–32)
CO2 SERPL-SCNC: 23 MMOL/L — SIGNIFICANT CHANGE UP (ref 17–32)
CO2 SERPL-SCNC: 24 MMOL/L — SIGNIFICANT CHANGE UP (ref 17–32)
CREAT ?TM UR-MCNC: 48 MG/DL — SIGNIFICANT CHANGE UP
CREAT SERPL-MCNC: 1.2 MG/DL — SIGNIFICANT CHANGE UP (ref 0.7–1.5)
CREAT SERPL-MCNC: 1.3 MG/DL — SIGNIFICANT CHANGE UP (ref 0.7–1.5)
CREAT SERPL-MCNC: 1.4 MG/DL — SIGNIFICANT CHANGE UP (ref 0.7–1.5)
EGFR: 53 ML/MIN/1.73M2 — LOW
EGFR: 57 ML/MIN/1.73M2 — LOW
EGFR: 63 ML/MIN/1.73M2 — SIGNIFICANT CHANGE UP
EOSINOPHIL # BLD AUTO: 0 K/UL — SIGNIFICANT CHANGE UP (ref 0–0.7)
EOSINOPHIL # BLD AUTO: 0.01 K/UL — SIGNIFICANT CHANGE UP (ref 0–0.7)
EOSINOPHIL # BLD AUTO: 0.12 K/UL — SIGNIFICANT CHANGE UP (ref 0–0.7)
EOSINOPHIL NFR BLD AUTO: 0 % — SIGNIFICANT CHANGE UP (ref 0–8)
EOSINOPHIL NFR BLD AUTO: 0.1 % — SIGNIFICANT CHANGE UP (ref 0–8)
EOSINOPHIL NFR BLD AUTO: 0.5 % — SIGNIFICANT CHANGE UP (ref 0–8)
GLUCOSE SERPL-MCNC: 132 MG/DL — HIGH (ref 70–99)
GLUCOSE SERPL-MCNC: 87 MG/DL — SIGNIFICANT CHANGE UP (ref 70–99)
GLUCOSE SERPL-MCNC: 99 MG/DL — SIGNIFICANT CHANGE UP (ref 70–99)
HCT VFR BLD CALC: 26.1 % — LOW (ref 37–47)
HCT VFR BLD CALC: 29 % — LOW (ref 37–47)
HCT VFR BLD CALC: 32 % — LOW (ref 37–47)
HGB BLD-MCNC: 10.1 G/DL — LOW (ref 12–16)
HGB BLD-MCNC: 10.9 G/DL — LOW (ref 12–16)
HGB BLD-MCNC: 9 G/DL — LOW (ref 12–16)
IMM GRANULOCYTES NFR BLD AUTO: 1 % — HIGH (ref 0.1–0.3)
IMM GRANULOCYTES NFR BLD AUTO: 1 % — HIGH (ref 0.1–0.3)
IMM GRANULOCYTES NFR BLD AUTO: 1.2 % — HIGH (ref 0.1–0.3)
INR BLD: 0.91 RATIO — SIGNIFICANT CHANGE UP (ref 0.65–1.3)
INR BLD: 0.95 RATIO — SIGNIFICANT CHANGE UP (ref 0.65–1.3)
INR BLD: 1.02 RATIO — SIGNIFICANT CHANGE UP (ref 0.65–1.3)
LACTATE SERPL-SCNC: 2.6 MMOL/L — HIGH (ref 0.7–2)
LACTATE SERPL-SCNC: 2.9 MMOL/L — HIGH (ref 0.7–2)
LACTATE SERPL-SCNC: 4.2 MMOL/L — CRITICAL HIGH (ref 0.7–2)
LDH SERPL L TO P-CCNC: 334 — HIGH (ref 50–242)
LDH SERPL L TO P-CCNC: 395 — HIGH (ref 50–242)
LDH SERPL L TO P-CCNC: 519 — HIGH (ref 50–242)
LYMPHOCYTES # BLD AUTO: 1.43 K/UL — SIGNIFICANT CHANGE UP (ref 1.2–3.4)
LYMPHOCYTES # BLD AUTO: 1.86 K/UL — SIGNIFICANT CHANGE UP (ref 1.2–3.4)
LYMPHOCYTES # BLD AUTO: 12.5 % — LOW (ref 20.5–51.1)
LYMPHOCYTES # BLD AUTO: 2.05 K/UL — SIGNIFICANT CHANGE UP (ref 1.2–3.4)
LYMPHOCYTES # BLD AUTO: 6 % — LOW (ref 20.5–51.1)
LYMPHOCYTES # BLD AUTO: 9.7 % — LOW (ref 20.5–51.1)
MAGNESIUM SERPL-MCNC: 6.3 MG/DL — CRITICAL HIGH (ref 1.8–2.4)
MCHC RBC-ENTMCNC: 28.1 PG — SIGNIFICANT CHANGE UP (ref 27–31)
MCHC RBC-ENTMCNC: 28.5 PG — SIGNIFICANT CHANGE UP (ref 27–31)
MCHC RBC-ENTMCNC: 28.9 PG — SIGNIFICANT CHANGE UP (ref 27–31)
MCHC RBC-ENTMCNC: 34.1 G/DL — SIGNIFICANT CHANGE UP (ref 32–37)
MCHC RBC-ENTMCNC: 34.5 G/DL — SIGNIFICANT CHANGE UP (ref 32–37)
MCHC RBC-ENTMCNC: 34.8 G/DL — SIGNIFICANT CHANGE UP (ref 32–37)
MCV RBC AUTO: 82.5 FL — SIGNIFICANT CHANGE UP (ref 81–99)
MCV RBC AUTO: 82.6 FL — SIGNIFICANT CHANGE UP (ref 81–99)
MCV RBC AUTO: 82.9 FL — SIGNIFICANT CHANGE UP (ref 81–99)
MONOCYTES # BLD AUTO: 1.14 K/UL — HIGH (ref 0.1–0.6)
MONOCYTES # BLD AUTO: 1.31 K/UL — HIGH (ref 0.1–0.6)
MONOCYTES # BLD AUTO: 1.73 K/UL — HIGH (ref 0.1–0.6)
MONOCYTES NFR BLD AUTO: 6.9 % — SIGNIFICANT CHANGE UP (ref 1.7–9.3)
MONOCYTES NFR BLD AUTO: 6.9 % — SIGNIFICANT CHANGE UP (ref 1.7–9.3)
MONOCYTES NFR BLD AUTO: 7.3 % — SIGNIFICANT CHANGE UP (ref 1.7–9.3)
NEUTROPHILS # BLD AUTO: 13.02 K/UL — HIGH (ref 1.4–6.5)
NEUTROPHILS # BLD AUTO: 15.7 K/UL — HIGH (ref 1.4–6.5)
NEUTROPHILS # BLD AUTO: 20.05 K/UL — HIGH (ref 1.4–6.5)
NEUTROPHILS NFR BLD AUTO: 79.3 % — HIGH (ref 42.2–75.2)
NEUTROPHILS NFR BLD AUTO: 82.2 % — HIGH (ref 42.2–75.2)
NEUTROPHILS NFR BLD AUTO: 84.8 % — HIGH (ref 42.2–75.2)
NORBUPRENORPHINE, UR RESULT: NEGATIVE NG/ML — SIGNIFICANT CHANGE UP
NRBC # BLD: 0 /100 WBCS — SIGNIFICANT CHANGE UP (ref 0–0)
PLATELET # BLD AUTO: 134 K/UL — SIGNIFICANT CHANGE UP (ref 130–400)
PLATELET # BLD AUTO: 150 K/UL — SIGNIFICANT CHANGE UP (ref 130–400)
PLATELET # BLD AUTO: 181 K/UL — SIGNIFICANT CHANGE UP (ref 130–400)
POTASSIUM SERPL-MCNC: 3.5 MMOL/L — SIGNIFICANT CHANGE UP (ref 3.5–5)
POTASSIUM SERPL-MCNC: 3.6 MMOL/L — SIGNIFICANT CHANGE UP (ref 3.5–5)
POTASSIUM SERPL-MCNC: 3.8 MMOL/L — SIGNIFICANT CHANGE UP (ref 3.5–5)
POTASSIUM SERPL-SCNC: 3.5 MMOL/L — SIGNIFICANT CHANGE UP (ref 3.5–5)
POTASSIUM SERPL-SCNC: 3.6 MMOL/L — SIGNIFICANT CHANGE UP (ref 3.5–5)
POTASSIUM SERPL-SCNC: 3.8 MMOL/L — SIGNIFICANT CHANGE UP (ref 3.5–5)
PROT SERPL-MCNC: 4.5 G/DL — LOW (ref 6–8)
PROT SERPL-MCNC: 5 G/DL — LOW (ref 6–8)
PROT SERPL-MCNC: 5.5 G/DL — LOW (ref 6–8)
PROTHROM AB SERPL-ACNC: 10.4 SEC — SIGNIFICANT CHANGE UP (ref 9.95–12.87)
PROTHROM AB SERPL-ACNC: 10.8 SEC — SIGNIFICANT CHANGE UP (ref 9.95–12.87)
PROTHROM AB SERPL-ACNC: 11.6 SEC — SIGNIFICANT CHANGE UP (ref 9.95–12.87)
RBC # BLD: 3.16 M/UL — LOW (ref 4.2–5.4)
RBC # BLD: 3.5 M/UL — LOW (ref 4.2–5.4)
RBC # BLD: 3.88 M/UL — LOW (ref 4.2–5.4)
RBC # FLD: 12.8 % — SIGNIFICANT CHANGE UP (ref 11.5–14.5)
RBC # FLD: 13.1 % — SIGNIFICANT CHANGE UP (ref 11.5–14.5)
RBC # FLD: 13.2 % — SIGNIFICANT CHANGE UP (ref 11.5–14.5)
SODIUM SERPL-SCNC: 132 MMOL/L — LOW (ref 135–146)
SODIUM SERPL-SCNC: 137 MMOL/L — SIGNIFICANT CHANGE UP (ref 135–146)
SODIUM SERPL-SCNC: 138 MMOL/L — SIGNIFICANT CHANGE UP (ref 135–146)
SODIUM UR-SCNC: 95 MMOL/L — SIGNIFICANT CHANGE UP
URATE SERPL-MCNC: 6.8 MG/DL — SIGNIFICANT CHANGE UP (ref 2.5–7)
URATE SERPL-MCNC: 6.9 MG/DL — SIGNIFICANT CHANGE UP (ref 2.5–7)
URATE SERPL-MCNC: 7.1 MG/DL — HIGH (ref 2.5–7)
WBC # BLD: 16.42 K/UL — HIGH (ref 4.8–10.8)
WBC # BLD: 19.1 K/UL — HIGH (ref 4.8–10.8)
WBC # BLD: 23.65 K/UL — HIGH (ref 4.8–10.8)
WBC # FLD AUTO: 16.42 K/UL — HIGH (ref 4.8–10.8)
WBC # FLD AUTO: 19.1 K/UL — HIGH (ref 4.8–10.8)
WBC # FLD AUTO: 23.65 K/UL — HIGH (ref 4.8–10.8)

## 2022-10-04 PROCEDURE — 99232 SBSQ HOSP IP/OBS MODERATE 35: CPT

## 2022-10-04 PROCEDURE — 71045 X-RAY EXAM CHEST 1 VIEW: CPT | Mod: 26

## 2022-10-04 PROCEDURE — 93306 TTE W/DOPPLER COMPLETE: CPT | Mod: 26

## 2022-10-04 PROCEDURE — 76770 US EXAM ABDO BACK WALL COMP: CPT | Mod: 26

## 2022-10-04 PROCEDURE — 88307 TISSUE EXAM BY PATHOLOGIST: CPT | Mod: 26

## 2022-10-04 RX ORDER — SODIUM CHLORIDE 9 MG/ML
1000 INJECTION, SOLUTION INTRAVENOUS
Refills: 0 | Status: DISCONTINUED | OUTPATIENT
Start: 2022-10-04 | End: 2022-10-04

## 2022-10-04 RX ORDER — LABETALOL HCL 100 MG
10 TABLET ORAL ONCE
Refills: 0 | Status: DISCONTINUED | OUTPATIENT
Start: 2022-10-04 | End: 2022-10-04

## 2022-10-04 RX ORDER — SODIUM CHLORIDE 9 MG/ML
1000 INJECTION, SOLUTION INTRAVENOUS ONCE
Refills: 0 | Status: DISCONTINUED | OUTPATIENT
Start: 2022-10-04 | End: 2022-10-04

## 2022-10-04 RX ORDER — SODIUM CHLORIDE 9 MG/ML
1000 INJECTION INTRAMUSCULAR; INTRAVENOUS; SUBCUTANEOUS
Refills: 0 | Status: DISCONTINUED | OUTPATIENT
Start: 2022-10-04 | End: 2022-10-04

## 2022-10-04 RX ORDER — POTASSIUM CHLORIDE 20 MEQ
40 PACKET (EA) ORAL ONCE
Refills: 0 | Status: COMPLETED | OUTPATIENT
Start: 2022-10-04 | End: 2022-10-04

## 2022-10-04 RX ORDER — LABETALOL HCL 100 MG
40 TABLET ORAL ONCE
Refills: 0 | Status: COMPLETED | OUTPATIENT
Start: 2022-10-04 | End: 2022-10-04

## 2022-10-04 RX ORDER — ACETAMINOPHEN 500 MG
1000 TABLET ORAL ONCE
Refills: 0 | Status: COMPLETED | OUTPATIENT
Start: 2022-10-04 | End: 2022-10-04

## 2022-10-04 RX ORDER — FUROSEMIDE 40 MG
20 TABLET ORAL ONCE
Refills: 0 | Status: COMPLETED | OUTPATIENT
Start: 2022-10-04 | End: 2022-10-04

## 2022-10-04 RX ORDER — PIPERACILLIN AND TAZOBACTAM 4; .5 G/20ML; G/20ML
2.25 INJECTION, POWDER, LYOPHILIZED, FOR SOLUTION INTRAVENOUS ONCE
Refills: 0 | Status: COMPLETED | OUTPATIENT
Start: 2022-10-04 | End: 2022-10-04

## 2022-10-04 RX ORDER — LABETALOL HCL 100 MG
20 TABLET ORAL ONCE
Refills: 0 | Status: COMPLETED | OUTPATIENT
Start: 2022-10-04 | End: 2022-10-04

## 2022-10-04 RX ORDER — SODIUM CHLORIDE 9 MG/ML
1000 INJECTION, SOLUTION INTRAVENOUS
Refills: 0 | Status: DISCONTINUED | OUTPATIENT
Start: 2022-10-04 | End: 2022-10-05

## 2022-10-04 RX ORDER — HYDRALAZINE HCL 50 MG
10 TABLET ORAL ONCE
Refills: 0 | Status: DISCONTINUED | OUTPATIENT
Start: 2022-10-04 | End: 2022-10-04

## 2022-10-04 RX ORDER — HEPARIN SODIUM 5000 [USP'U]/ML
5000 INJECTION INTRAVENOUS; SUBCUTANEOUS EVERY 12 HOURS
Refills: 0 | Status: DISCONTINUED | OUTPATIENT
Start: 2022-10-04 | End: 2022-10-05

## 2022-10-04 RX ORDER — CITRIC ACID/SODIUM CITRATE 300-500 MG
30 SOLUTION, ORAL ORAL EVERY 6 HOURS
Refills: 0 | Status: DISCONTINUED | OUTPATIENT
Start: 2022-10-04 | End: 2022-10-08

## 2022-10-04 RX ORDER — PIPERACILLIN AND TAZOBACTAM 4; .5 G/20ML; G/20ML
2.25 INJECTION, POWDER, LYOPHILIZED, FOR SOLUTION INTRAVENOUS ONCE
Refills: 0 | Status: DISCONTINUED | OUTPATIENT
Start: 2022-10-04 | End: 2022-10-04

## 2022-10-04 RX ORDER — ACETAMINOPHEN 500 MG
1000 TABLET ORAL ONCE
Refills: 0 | Status: DISCONTINUED | OUTPATIENT
Start: 2022-10-04 | End: 2022-10-04

## 2022-10-04 RX ORDER — LEVETIRACETAM 250 MG/1
500 TABLET, FILM COATED ORAL EVERY 12 HOURS
Refills: 0 | Status: DISCONTINUED | OUTPATIENT
Start: 2022-10-04 | End: 2022-10-05

## 2022-10-04 RX ORDER — SODIUM CHLORIDE 9 MG/ML
1000 INJECTION, SOLUTION INTRAVENOUS ONCE
Refills: 0 | Status: COMPLETED | OUTPATIENT
Start: 2022-10-04 | End: 2022-10-04

## 2022-10-04 RX ORDER — PIPERACILLIN AND TAZOBACTAM 4; .5 G/20ML; G/20ML
2.25 INJECTION, POWDER, LYOPHILIZED, FOR SOLUTION INTRAVENOUS EVERY 8 HOURS
Refills: 0 | Status: DISCONTINUED | OUTPATIENT
Start: 2022-10-04 | End: 2022-10-04

## 2022-10-04 RX ORDER — OXYCODONE HYDROCHLORIDE 5 MG/1
5 TABLET ORAL ONCE
Refills: 0 | Status: DISCONTINUED | OUTPATIENT
Start: 2022-10-04 | End: 2022-10-04

## 2022-10-04 RX ORDER — OXYCODONE HYDROCHLORIDE 5 MG/1
5 TABLET ORAL
Refills: 0 | Status: DISCONTINUED | OUTPATIENT
Start: 2022-10-04 | End: 2022-10-08

## 2022-10-04 RX ORDER — NIFEDIPINE 30 MG
30 TABLET, EXTENDED RELEASE 24 HR ORAL DAILY
Refills: 0 | Status: DISCONTINUED | OUTPATIENT
Start: 2022-10-04 | End: 2022-10-05

## 2022-10-04 RX ADMIN — Medication 20 MILLIGRAM(S): at 21:12

## 2022-10-04 RX ADMIN — Medication 20 MILLIGRAM(S): at 01:53

## 2022-10-04 RX ADMIN — Medication 20 MILLIGRAM(S): at 00:38

## 2022-10-04 RX ADMIN — SENNA PLUS 2 TABLET(S): 8.6 TABLET ORAL at 22:14

## 2022-10-04 RX ADMIN — HEPARIN SODIUM 5000 UNIT(S): 5000 INJECTION INTRAVENOUS; SUBCUTANEOUS at 22:14

## 2022-10-04 RX ADMIN — SODIUM CHLORIDE 1000 MILLILITER(S): 9 INJECTION, SOLUTION INTRAVENOUS at 00:12

## 2022-10-04 RX ADMIN — SIMETHICONE 80 MILLIGRAM(S): 80 TABLET, CHEWABLE ORAL at 18:57

## 2022-10-04 RX ADMIN — Medication 40 MILLIEQUIVALENT(S): at 22:38

## 2022-10-04 RX ADMIN — LEVETIRACETAM 400 MILLIGRAM(S): 250 TABLET, FILM COATED ORAL at 13:02

## 2022-10-04 RX ADMIN — Medication 400 MILLIGRAM(S): at 04:53

## 2022-10-04 RX ADMIN — Medication 20 MILLIGRAM(S): at 22:41

## 2022-10-04 RX ADMIN — Medication 400 MILLIGRAM(S): at 16:01

## 2022-10-04 RX ADMIN — OXYCODONE HYDROCHLORIDE 5 MILLIGRAM(S): 5 TABLET ORAL at 10:53

## 2022-10-04 RX ADMIN — Medication 1000 MILLIGRAM(S): at 05:26

## 2022-10-04 RX ADMIN — SIMETHICONE 80 MILLIGRAM(S): 80 TABLET, CHEWABLE ORAL at 06:12

## 2022-10-04 RX ADMIN — LEVETIRACETAM 400 MILLIGRAM(S): 250 TABLET, FILM COATED ORAL at 01:00

## 2022-10-04 RX ADMIN — Medication 40 MILLIGRAM(S): at 23:29

## 2022-10-04 RX ADMIN — Medication 20 MILLIGRAM(S): at 23:13

## 2022-10-04 RX ADMIN — OXYCODONE HYDROCHLORIDE 5 MILLIGRAM(S): 5 TABLET ORAL at 19:03

## 2022-10-04 RX ADMIN — Medication 30 MILLIGRAM(S): at 22:38

## 2022-10-04 RX ADMIN — SODIUM CHLORIDE 125 MILLILITER(S): 9 INJECTION INTRAMUSCULAR; INTRAVENOUS; SUBCUTANEOUS at 04:21

## 2022-10-04 RX ADMIN — PIPERACILLIN AND TAZOBACTAM 200 GRAM(S): 4; .5 INJECTION, POWDER, LYOPHILIZED, FOR SOLUTION INTRAVENOUS at 06:07

## 2022-10-04 RX ADMIN — SIMETHICONE 80 MILLIGRAM(S): 80 TABLET, CHEWABLE ORAL at 01:16

## 2022-10-04 RX ADMIN — Medication 40 MILLIGRAM(S): at 21:31

## 2022-10-04 RX ADMIN — SODIUM CHLORIDE 75 MILLILITER(S): 9 INJECTION, SOLUTION INTRAVENOUS at 12:16

## 2022-10-04 RX ADMIN — Medication 1000 MILLIGRAM(S): at 18:20

## 2022-10-04 NOTE — CONSULT NOTE ADULT - CONSULT REASON
pre-eclampsia with severe features, anuria
EVELIO with anuria
Post- anuria in patient with severe preeclampsia

## 2022-10-04 NOTE — CHART NOTE - NSCHARTNOTEFT_GEN_A_CORE
PGY-4 Note:     S/p IVF bolus of 1000cc LR followed by 20mg IV lasix resulted in urine output of 5cc. STAT urine lytes obtained from the 5cc of urine produced. FeNa of 1.8%. STAT repeat labs at 0300 obtained and resulted as below:                           10.1   23.65 )-----------( 181      ( 04 Oct 2022 03:15 )             29.0   10-04    132<L>  |  98  |  4<L>  ----------------------------<  132<H>  3.8   |  15<L>  |  1.3    Ca    7.6<L>      04 Oct 2022 03:15  Mg     6.3     10-03    TPro  5.0<L>  /  Alb  2.4<L>  /  TBili  0.5  /  DBili  x   /  AST  29  /  ALT  9   /  AlkPhos  255<H>  10-04    Lactate, Blood: 4.2      UO: 0cc (from 1724-3208)      Vital Signs Last 24 Hrs  T(C): 36.9 (03 Oct 2022 21:30), Max: 37.2 (03 Oct 2022 05:26)  T(F): 98.4 (03 Oct 2022 21:30), Max: 98.9 (03 Oct 2022 05:26)  HR: 85 (04 Oct 2022 04:34) (67 - 126)  BP: 136/78 (04 Oct 2022 04:19) (113/62 - 197/104)  RR: 16 (03 Oct 2022 09:41) (16 - 16)  SpO2: 97% (04 Oct 2022 04:33) (92% - 100%)    Physical Exam:   GA: AOX3, no apparent distress  Resp: CTAB  Cardio: RRR  Abd: soft, nontender, appropriately tender near Pfannenstiel incision; surgical dressing with scant serosanguinous drainage   Extr: no erythema or pain to palpation bilaterally; 2+ bilateral pitting edema to the level of the thighs      A/P: 28yo now P1 POD#1 s/p primary  section for arrest of dilation with an EBL of 800cc; with preeclampsia with severe features on keppra for seizure prophylaxis, now with persistent anuria and intrinsic acute kidney injury   -case discussed with on-call MFM attending. Recommending STAT nephrology and medical ICU consult for anuria in the setting of intrinsic acute kidney injury     #intrinsic EVELIO   -FeNa 1.8%   -STAT nephrology consult   -strict I/Os   -continue indwelling urinary catheter for critical monitoring of urine output     #anuria   -s/p 1000cc LR bolus followed by 20mg IV lasix with minimal response  -STAT CXR ordered to r/o fluid overload   -ECHO ordered   -strict I/Os  -continue indwelling urinary catheter for critical monitoring of urine output     #pre-eclampsia with severe features   -Keppra 500mg BID for seizure prophylaxis in the setting of anuria and increasing creatinine  -strict I/Os  -PELs q4 hours   -diet: clear liquid diet   -activity: bedrest with bathroom privileges   -strict I/Os   -neuro checks q2 hours   -continue indwelling urinary catheter for critical monitoring of urine output     #leukocytosis   -uptrending WBC (19 --> 23k)   -lactate of 4.2   -will consider starting antibiotics for postoperative infection prophylaxis after nephrology and ICU consult       Discussed with Dr. Matias and Dr. Palm PGY-4 Note:     S/p IVF bolus of 1000cc LR followed by 20mg IV lasix resulted in urine output of 5cc. STAT urine lytes obtained from the 5cc of urine produced. FeNa of 1.8%. STAT repeat labs at 0300 obtained and resulted as below:                           10.1   23.65 )-----------( 181      ( 04 Oct 2022 03:15 )             29.0   10-04    132<L>  |  98  |  4<L>  ----------------------------<  132<H>  3.8   |  15<L>  |  1.3    Ca    7.6<L>      04 Oct 2022 03:15  Mg     6.3     10-03    TPro  5.0<L>  /  Alb  2.4<L>  /  TBili  0.5  /  DBili  x   /  AST  29  /  ALT  9   /  AlkPhos  255<H>  10-04    Lactate, Blood: 4.2      UO: 0cc (from 8984-5023)      Vital Signs Last 24 Hrs  T(C): 36.9 (03 Oct 2022 21:30), Max: 37.2 (03 Oct 2022 05:26)  T(F): 98.4 (03 Oct 2022 21:30), Max: 98.9 (03 Oct 2022 05:26)  HR: 85 (04 Oct 2022 04:34) (67 - 126)  BP: 136/78 (04 Oct 2022 04:19) (113/62 - 197/104)  RR: 16 (03 Oct 2022 09:41) (16 - 16)  SpO2: 97% (04 Oct 2022 04:33) (92% - 100%)    Physical Exam:   GA: AOX3, no apparent distress  Resp: CTAB  Cardio: RRR  Abd: soft, nontender, appropriately tender near Pfannenstiel incision; surgical dressing with scant serosanguinous drainage   Extr: no erythema or pain to palpation bilaterally; 2+ bilateral pitting edema to the level of the thighs      A/P: 28yo now P1 POD#1 s/p primary  section for arrest of dilation with an EBL of 800cc; with preeclampsia with severe features on keppra for seizure prophylaxis, now with persistent anuria and intrinsic acute kidney injury   -case discussed with on-call MFM attending. Recommending STAT nephrology and medical ICU consult for anuria in the setting of intrinsic acute kidney injury     #intrinsic EVELIO   -FeNa 1.8%   -STAT nephrology consult   -strict I/Os   -continue indwelling urinary catheter for critical monitoring of urine output     #anuria   -s/p 1000cc LR bolus followed by 20mg IV lasix with minimal response  -STAT CXR ordered to r/o fluid overload   -ECHO ordered   -strict I/Os  -continue indwelling urinary catheter for critical monitoring of urine output     #pre-eclampsia with severe features   -Keppra 500mg BID for seizure prophylaxis in the setting of anuria and increasing creatinine  -strict I/Os  -PELs q4 hours   -diet: clear liquid diet   -activity: bedrest with bathroom privileges   -strict I/Os   -neuro checks q2 hours   -continue indwelling urinary catheter for critical monitoring of urine output     #leukocytosis   -uptrending WBC (19 --> 23k)   -lactate of 4.2   -will consider starting antibiotics for postoperative infection prophylaxis after nephrology and ICU consult       Discussed with Dr. Matias and Dr. Palm    Attending note:  All information reviewed with chief resident. Pt with anuria most likely secondarily to preeclampsia with severe features and evelio. Awaiting ICU consult and Nephrology consult. Awaiting cxr results and ECHO. For repeat labs at 7:00am

## 2022-10-04 NOTE — CHART NOTE - NSCHARTNOTEFT_GEN_A_CORE
PGY1 Note    Pt seen at bedside for back to back severe range BPs. Pt reports feeling fine, apart from some abdominal discomfort at the area of her CS incision.     20mg IV labetalol pushed @2112.   F/u epeat BP 10 minutes       Dr. Campbell and Dr. Martin aware PGY1 Note    Pt seen at bedside for back to back severe range BPs. Pt reports feeling fine, apart from some abdominal discomfort at the area of her CS incision.     20mg IV labetalol pushed @2112.   F/u repeat BP 10 minutes     ADDENDUM  repeat BP severe range. 40mg IV labetalol pushed @2131. Pt remains asymptomatic at this time.     F/u repeat BP 10 min      Dr. Campbell and Dr. Martin aware PGY1 Note    Pt seen at bedside for back to back severe range BPs. Pt reports feeling fine, apart from some abdominal discomfort at the area of her CS incision.     20mg IV labetalol pushed @2112.   F/u repeat BP 10 minutes     ADDENDUM  repeat BP severe range. 40mg IV labetalol pushed @2131. Pt remains asymptomatic at this time.     F/u repeat BP 10 min    ADDENDUM  repeat BP non-severe.   F/u BPs q15m       Dr. Campbell and Dr. Martin aware

## 2022-10-04 NOTE — CHART NOTE - NSCHARTNOTEFT_GEN_A_CORE
PGY1 Note    Pt seen at bedside for back to back severe range BPs. Pt denies any symptoms, holding baby, and sitting in chair currently. Recently started on Procardia 30mg XL and Lasix.     20mg IV push labetalol given @2313    f/u BP 10 min      Dr. Campbell and Dr. Martin aware PGY1 Note    Pt seen at bedside for back to back severe range BPs. Pt denies any symptoms, holding baby, and sitting in chair currently. Recently started on Procardia 30mg XL and Lasix.     20mg IV push labetalol given @2313    f/u BP 10 min      ADDENDUM  BP remains severe range    40mg labetalol IV push @2329  repeat BP 10 min    Dr. Campbell and Dr. Martin aware PGY1 Note    Pt seen at bedside for back to back severe range BPs. Pt denies any symptoms, holding baby, and sitting in chair currently. Recently started on Procardia 30mg XL and Lasix.     20mg IV push labetalol given @2313    f/u BP 10 min      ADDENDUM  BP remains severe range    40mg labetalol IV push @2329  repeat BP 10 min    ADDENDUM  Repeat BP non severe range.     continue to monitor BPs q15m    Dr. Campbell and Dr. Martin aware

## 2022-10-04 NOTE — CONSULT NOTE ADULT - SUBJECTIVE AND OBJECTIVE BOX
Follow-up consultation note:  Patient was seen and assessed at the bedside.  She has been having anuria postoperatively  She is a 26 y/o  s/p  section POD#1 for arrest of dilatation with severe preeclampsia that required 2 IV anti-hypertensive medication doses.  the patient has no significant past medical history. Her surgical history is significant for 2 D&C. She reports being on anti- GERD medication during pregnancy and also smoking weed before pregnancy but stopped when she found out she was pregnant.  She denies any history of kidney disease herself or in her family.  She also developed acute renal failure post-operatively with anuria that did not respond to Lasix. She had a renal US that was within normal limits.  VS reviewed and BP noted to be in the 140s-160s (non-sustained severe)/80s-100s, pulse in 70s-80s, and oxygen saturation is 99 % on room air.  Chest is CTB, S1S2 regular  Abdomen is soft tender around the incision.  Dressing clean and dry  Lochia appropriate for stage  Bilateral lower limb edema 2+    labs reviewed:                          10.1   23.65 )-----------( 181      ( 04 Oct 2022 03:15 )             29.0     10-04    132<L>  |  98  |  4<L>  ----------------------------<  132<H>  3.8   |  15<L>  |  1.3    Ca    7.6<L>      04 Oct 2022 03:15  Mg     6.3     10-03    TPro  5.0<L>  /  Alb  2.4<L>  /  TBili  0.5  /  DBili  x   /  AST  29  /  ALT  9   /  AlkPhos  255<H>  10-04  Lactic acid 4.2  Urine toxicology positive for buprenorphine.  Maternal echo: pending.  Nephrology consultation appreciated.   Bedside sonogram of the bladder showed a distended bladder.  When attempting to reposition the Stewart, urine was draining and a total 1000 mL turbid urine came out. We replaced the Stewart.

## 2022-10-04 NOTE — PROGRESS NOTE ADULT - ASSESSMENT
A/P: 28yo P1 s/p primary LTCS for arrest of dilation, POD#1, , pre-eclampsia w/ severe features on keppra for siezure prophylaxis, now with anuria 2/2 to intrinsic EVELIO, s/p multiple IV boluses and lasix,   - continue routine post op care  - nephro consult f/u recs  - mag discontinued to due evelio (Fena suggestive of intrinsic injury), started on keppra 500BID for seizure prophylaxis  - nuñez in place will continue to monitor UO  - f/u blood pressures currently elevated in the nonsevere range  - f/u renal scan, echo, chest xray  - f/u AM labs  - encourage ambulation, PO hydration  - monitor vitals, bleeding   - simethicone/senna  - DVT prophylaxis: lovenox + scds  - pain mgmt prn   - s/p MICU consult, patient accepted into CEU    Dr. Palm at bedside

## 2022-10-04 NOTE — CHART NOTE - NSCHARTNOTEFT_GEN_A_CORE
PGY1 note    Severe range BPs (178/95, 174/88) while midline placement attempted, will continue to monitor BP q15min.      Dr. Suresh aware

## 2022-10-04 NOTE — CHART NOTE - NSCHARTNOTEFT_GEN_A_CORE
PGY1 Note     Pt seen at bedside for two severe range blood pressures 15 minutes apart. 20mg IV push Labetalol administered @0153. Repeat BP 10 min later non severe.     Pt already reached maximum of 24 hour dosing of hydralazine.     Continue to monitor BPs q15m    Dr. Campbell aware

## 2022-10-04 NOTE — CONSULT NOTE ADULT - ATTENDING COMMENTS
PT seen and examined  Case discussed w/ MFM/OB  26yo P1 s/p c section 10/3  for arrest of dilation, pre-eclampsia w/ severe  features  Post op course complicated by Severe EVELIO/anuria Cr milton 0.7 - -> 1.3  No hx of  CKD, HTN, no hx of autoimmune processes    Preop Cr was 0.7, UA showed no RBC's (though + Hgb) 0.7 Gram proteinuria  Seems unlikely that she has suddenly developed an Acute Glomerulonephritis  ATN more likely though Even is Sepsis (rising WBC/lactate) not all that hypotensive,  that would expect such severe ATN to the point of anuria  no new meds started recently that would be expected to cause AIN  Anuric EVELIO has been described w/ preeclampsia though this is rare   No obstruction on US    Suggest  - Change Fluids to  D5 w/ bicarb given worsening acidosis dec rate  as is at risk for pulm edema  - Trend Cr, K if K > 5 start Lokelma 10 mg BID  - check VBG  - trend Lactate  - check CPK (? rhabdo given + hgb on UA no RBC's)  - IF develops Vol overload/hyperk/acidmeia not manageable medically will need HD, No indication at this time   cont supportive care     will follow

## 2022-10-04 NOTE — CONSULT NOTE ADULT - ASSESSMENT
s/p primary  section POD#1 with EVELIO likely due severe preeclampsia compounded by anuria however, likely due to urinary retention from Stewart kink.  Elevated lactic acid and WBC could be post-operative along with severe preeclampsia.    Recommend:  - Continue strict I&O.  - Repeat labs STAT and then q 4-6 hours depending on results.  - Switch fluid to D5 with Bicarb as recommended by nephrology  - Patient may eat.  - No need to transfer to step-down unit.  - Keep on Keppra for now.

## 2022-10-04 NOTE — CONSULT NOTE ADULT - ASSESSMENT
28yo P1 with preeclampsia with severe features s/p  on 10/3, hospital stay complicated by EVELIO with anuria not improving after lasix iv. Nephro eval for EVELIO and anuria.    Assessment and plan  EVELIO/ metabolic acidosis/ Preeclampsia s/p  on 10/3  EVELIO etiology unclear as of now, possible ATN vs severe preeclampsia  US kidneys no hydro  Urine studies FeNA 1.8  HAGMA secondary to lactic acidosis  s/p 1 L of LR and NS infusion  switch NS to D5W with 150 meq bicarb at 75 cc/hr  Trend lactate, get VBG  keep folley with strict i/os  no need for RRT as of now  will follow

## 2022-10-04 NOTE — PROGRESS NOTE ADULT - SUBJECTIVE AND OBJECTIVE BOX
JANET HUTCHISON  27y  Female    PGY3 Note:    Pt is POD#1. Pt is recovering well, no acute complaints. Pt denies heavy vaginal bleeding, pain well controlled on PO medication. Patient is tolerating a regular diet. Patient has not yet ambulated or passed flatus. Stewart in place with no urine output.  Breast and bottle feeding. Denies headache, chest pain, SOB, fever, chills, nausea, vomiting, extremity pain or swelling.       PAST MEDICAL & SURGICAL HISTORY:  No pertinent past medical history  S/P dilation and curettage          Physical Exam  Vital Signs Last 24 Hrs  T(C): 37.1 (04 Oct 2022 05:00), Max: 37.1 (04 Oct 2022 05:00)  T(F): 98.8 (04 Oct 2022 05:00), Max: 98.8 (04 Oct 2022 05:00)  HR: 76 (04 Oct 2022 06:19) (76 - 126)  BP: 144/90 (04 Oct 2022 06:19) (113/62 - 196/117)  RR: 16 (03 Oct 2022 09:41) (16 - 16)  SpO2: 99% (04 Oct 2022 06:18) (92% - 100%)      Gen: AAOx3, NAD  Heart: RRR, no M/R/G  Lungs: CTAB  Fundus: firm, below umbilicus, nontender   Wound: Sterile dressing in place over Pfannenstiel incision, c/d/i  Abd: Soft, appropriately tender near incision site, mildly distended, BS+  Lochia: minimal  Ext: No calf tenderness, 2+ edema in bilateral LE     UOmin 58cc/hr: (9423-6200) 0cc    Labs:                        10.1   23.65 )-----------( 181      ( 04 Oct 2022 03:15 )             29.0                         11.3   19.57 )-----------( 188      ( 03 Oct 2022 22:39 )             33.6      MEDICATIONS  (STANDING):  diphtheria/tetanus/pertussis (acellular) Vaccine (ADAcel) 0.5 milliLiter(s) IntraMuscular once  enoxaparin Injectable 40 milliGRAM(s) SubCutaneous every 24 hours  ibuprofen  Tablet. 600 milliGRAM(s) Oral every 6 hours  levETIRAcetam  IVPB 500 milliGRAM(s) IV Intermittent every 12 hours  senna 2 Tablet(s) Oral at bedtime  simethicone 80 milliGRAM(s) Chew every 6 hours  sodium chloride 0.9%. 1000 milliLiter(s) (125 mL/Hr) IV Continuous <Continuous>    MEDICATIONS  (PRN):  diphenhydrAMINE 25 milliGRAM(s) Oral every 6 hours PRN Pruritus  lanolin Ointment 1 Application(s) Topical every 6 hours PRN Sore Nipples  magnesium hydroxide Suspension 30 milliLiter(s) Oral two times a day PRN Constipation  oxyCODONE    IR 5 milliGRAM(s) Oral every 3 hours PRN Moderate to Severe Pain (4-10)  oxyCODONE    IR 5 milliGRAM(s) Oral once PRN Moderate to Severe Pain (4-10)

## 2022-10-04 NOTE — CONSULT NOTE ADULT - SUBJECTIVE AND OBJECTIVE BOX
NEPHROLOGY CONSULTATION NOTE    28yo P1 with preeclampsia with severe features s/p  on 10/3, hospital stay complicated by EVELIO with anuria not improving after lasix iv shot.  Patient seen at bedside, complaints of generalized fatigue.  VS shows mild elevation of BP.    PAST MEDICAL & SURGICAL HISTORY:  No pertinent past medical history      S/P dilation and curettage        Allergies:  No Known Allergies    Home Medications Reviewed  Hospital Medications:   MEDICATIONS  (STANDING):  diphtheria/tetanus/pertussis (acellular) Vaccine (ADAcel) 0.5 milliLiter(s) IntraMuscular once  enoxaparin Injectable 40 milliGRAM(s) SubCutaneous every 24 hours  ibuprofen  Tablet. 600 milliGRAM(s) Oral every 6 hours  levETIRAcetam  IVPB 500 milliGRAM(s) IV Intermittent every 12 hours  senna 2 Tablet(s) Oral at bedtime  simethicone 80 milliGRAM(s) Chew every 6 hours  sodium chloride 0.9%. 1000 milliLiter(s) (75 mL/Hr) IV Continuous <Continuous>    SOCIAL HISTORY:  Denies ETOH,Smoking,   FAMILY HISTORY:  No pertinent family history in first degree relatives        REVIEW OF SYSTEMS:  CONSTITUTIONAL: No weakness, fevers or chills  EYES/ENT: No visual changes;  No vertigo or throat pain   NECK: No pain or stiffness  RESPIRATORY: No cough, wheezing, hemoptysis; No shortness of breath  CARDIOVASCULAR: No chest pain or palpitations.  GASTROINTESTINAL: No abdominal or epigastric pain. No nausea, vomiting, or hematemesis; No diarrhea or constipation. No melena or hematochezia.  GENITOURINARY: No dysuria, frequency, foamy urine, urinary urgency, incontinence or hematuria  NEUROLOGICAL: No numbness or weakness  SKIN: No itching, burning, rashes, or lesions   VASCULAR: No bilateral lower extremity edema.   All other review of systems is negative unless indicated above.    VITALS:  Vital Signs Last 24 Hrs  T(C): 37.1 (04 Oct 2022 05:00), Max: 37.1 (04 Oct 2022 05:00)  T(F): 98.8 (04 Oct 2022 05:00), Max: 98.8 (04 Oct 2022 05:00)  HR: 99 (04 Oct 2022 09:39) (76 - 126)  BP: 167/101 (04 Oct 2022 09:29) (113/62 - 181/92)  BP(mean): --  RR: --  SpO2: 98% (04 Oct 2022 09:39) (92% - 100%)        10-03 @ 07:01  -  10-04 @ 07:00  --------------------------------------------------------  IN: 5785 mL / OUT: 160 mL / NET: 5625 mL        PHYSICAL EXAM:  Constitutional: NAD  HEENT: anicteric sclera, oropharynx clear, MMM  Neck: No JVD  Respiratory: CTAB, no wheezes, rales or rhonchi  Cardiovascular: S1, S2, RRR  Gastrointestinal: BS+, soft, NT/ND  Extremities: No cyanosis or clubbing. No peripheral edema  Neurological: A/O x 3, no focal deficits  Psychiatric: Normal mood, normal affect  : No CVA tenderness. No nuñez.   Skin: No rashes  Vascular Access:    LABS:  10-04    132<L>  |  98  |  4<L>  ----------------------------<  132<H>  3.8   |  15<L>  |  1.3    Ca    7.6<L>      04 Oct 2022 03:15  Mg     6.3     10-03    TPro  5.0<L>  /  Alb  2.4<L>  /  TBili  0.5  /  DBili      /  AST  29  /  ALT  9   /  AlkPhos  255<H>  10-04    Creatinine Trend: 1.3 <--, 1.3 <--, 0.9 <--, 0.8 <--, 0.7 <--                        10.1   23.65 )-----------( 181      ( 04 Oct 2022 03:15 )             29.0     Urine Studies:  Urinalysis Basic - ( 03 Oct 2022 08:10 )    Color: Karla / Appearance: Slightly Turbid / S.020 / pH:   Gluc:  / Ketone: Trace  / Bili: Small / Urobili: 12 mg/dL   Blood:  / Protein: 100 mg/dL / Nitrite: Negative   Leuk Esterase: Large / RBC: 1 /HPF / WBC 82 /HPF   Sq Epi:  / Non Sq Epi: >27 /HPF / Bacteria: Moderate      Creatinine, Random Urine: 48 mg/dL (10-04 @ 01:56)  Sodium, Random Urine: 95.0 mmoL/L (10-04 @ 01:56)  Creatinine, Random Urine: 327 mg/dL (10-03 @ 08:10)  Protein/Creatinine Ratio Calculation: 0.7 Ratio (10-03 @ 08:10)            RADIOLOGY & ADDITIONAL STUDIES:

## 2022-10-05 LAB
ALBUMIN SERPL ELPH-MCNC: 2.3 G/DL — LOW (ref 3.5–5.2)
ALBUMIN SERPL ELPH-MCNC: 2.6 G/DL — LOW (ref 3.5–5.2)
ALBUMIN SERPL ELPH-MCNC: 2.8 G/DL — LOW (ref 3.5–5.2)
ALP SERPL-CCNC: 183 U/L — HIGH (ref 30–115)
ALP SERPL-CCNC: 224 U/L — HIGH (ref 30–115)
ALP SERPL-CCNC: 228 U/L — HIGH (ref 30–115)
ALT FLD-CCNC: 6 U/L — SIGNIFICANT CHANGE UP (ref 0–41)
ALT FLD-CCNC: 7 U/L — SIGNIFICANT CHANGE UP (ref 0–41)
ALT FLD-CCNC: 7 U/L — SIGNIFICANT CHANGE UP (ref 0–41)
ANION GAP SERPL CALC-SCNC: 11 MMOL/L — SIGNIFICANT CHANGE UP (ref 7–14)
ANION GAP SERPL CALC-SCNC: 5 MMOL/L — LOW (ref 7–14)
ANION GAP SERPL CALC-SCNC: 9 MMOL/L — SIGNIFICANT CHANGE UP (ref 7–14)
APTT BLD: 29.5 SEC — SIGNIFICANT CHANGE UP (ref 27–39.2)
AST SERPL-CCNC: 13 U/L — SIGNIFICANT CHANGE UP (ref 0–41)
AST SERPL-CCNC: 17 U/L — SIGNIFICANT CHANGE UP (ref 0–41)
AST SERPL-CCNC: 17 U/L — SIGNIFICANT CHANGE UP (ref 0–41)
BASOPHILS # BLD AUTO: 0.02 K/UL — SIGNIFICANT CHANGE UP (ref 0–0.2)
BASOPHILS # BLD AUTO: 0.02 K/UL — SIGNIFICANT CHANGE UP (ref 0–0.2)
BASOPHILS # BLD AUTO: 0.03 K/UL — SIGNIFICANT CHANGE UP (ref 0–0.2)
BASOPHILS NFR BLD AUTO: 0.2 % — SIGNIFICANT CHANGE UP (ref 0–1)
BILIRUB SERPL-MCNC: 0.2 MG/DL — SIGNIFICANT CHANGE UP (ref 0.2–1.2)
BILIRUB SERPL-MCNC: 0.3 MG/DL — SIGNIFICANT CHANGE UP (ref 0.2–1.2)
BILIRUB SERPL-MCNC: 0.3 MG/DL — SIGNIFICANT CHANGE UP (ref 0.2–1.2)
BUN SERPL-MCNC: 5 MG/DL — LOW (ref 10–20)
BUN SERPL-MCNC: 6 MG/DL — LOW (ref 10–20)
BUN SERPL-MCNC: 6 MG/DL — LOW (ref 10–20)
CALCIUM SERPL-MCNC: 6.3 MG/DL — LOW (ref 8.4–10.5)
CALCIUM SERPL-MCNC: 7.5 MG/DL — LOW (ref 8.4–10.5)
CALCIUM SERPL-MCNC: 7.8 MG/DL — LOW (ref 8.4–10.4)
CHLORIDE SERPL-SCNC: 101 MMOL/L — SIGNIFICANT CHANGE UP (ref 98–110)
CHLORIDE SERPL-SCNC: 102 MMOL/L — SIGNIFICANT CHANGE UP (ref 98–110)
CHLORIDE SERPL-SCNC: 94 MMOL/L — LOW (ref 98–110)
CO2 SERPL-SCNC: 29 MMOL/L — SIGNIFICANT CHANGE UP (ref 17–32)
CO2 SERPL-SCNC: 29 MMOL/L — SIGNIFICANT CHANGE UP (ref 17–32)
CO2 SERPL-SCNC: 43 MMOL/L — CRITICAL HIGH (ref 17–32)
CREAT SERPL-MCNC: 0.7 MG/DL — SIGNIFICANT CHANGE UP (ref 0.7–1.5)
CREAT SERPL-MCNC: 0.8 MG/DL — SIGNIFICANT CHANGE UP (ref 0.7–1.5)
CREAT SERPL-MCNC: 0.8 MG/DL — SIGNIFICANT CHANGE UP (ref 0.7–1.5)
EGFR: 103 ML/MIN/1.73M2 — SIGNIFICANT CHANGE UP
EGFR: 103 ML/MIN/1.73M2 — SIGNIFICANT CHANGE UP
EGFR: 121 ML/MIN/1.73M2 — SIGNIFICANT CHANGE UP
EOSINOPHIL # BLD AUTO: 0.07 K/UL — SIGNIFICANT CHANGE UP (ref 0–0.7)
EOSINOPHIL # BLD AUTO: 0.09 K/UL — SIGNIFICANT CHANGE UP (ref 0–0.7)
EOSINOPHIL # BLD AUTO: 0.14 K/UL — SIGNIFICANT CHANGE UP (ref 0–0.7)
EOSINOPHIL NFR BLD AUTO: 0.6 % — SIGNIFICANT CHANGE UP (ref 0–8)
EOSINOPHIL NFR BLD AUTO: 0.6 % — SIGNIFICANT CHANGE UP (ref 0–8)
EOSINOPHIL NFR BLD AUTO: 1.2 % — SIGNIFICANT CHANGE UP (ref 0–8)
GLUCOSE BLDC GLUCOMTR-MCNC: 83 MG/DL — SIGNIFICANT CHANGE UP (ref 70–99)
GLUCOSE SERPL-MCNC: 675 MG/DL — CRITICAL HIGH (ref 70–99)
GLUCOSE SERPL-MCNC: 68 MG/DL — LOW (ref 70–99)
GLUCOSE SERPL-MCNC: 81 MG/DL — SIGNIFICANT CHANGE UP (ref 70–99)
HCT VFR BLD CALC: 23.5 % — LOW (ref 37–47)
HCT VFR BLD CALC: 26.5 % — LOW (ref 37–47)
HCT VFR BLD CALC: 28.7 % — LOW (ref 37–47)
HGB BLD-MCNC: 8.1 G/DL — LOW (ref 12–16)
HGB BLD-MCNC: 8.9 G/DL — LOW (ref 12–16)
HGB BLD-MCNC: 9.5 G/DL — LOW (ref 12–16)
IMM GRANULOCYTES NFR BLD AUTO: 0.7 % — HIGH (ref 0.1–0.3)
IMM GRANULOCYTES NFR BLD AUTO: 0.8 % — HIGH (ref 0.1–0.3)
IMM GRANULOCYTES NFR BLD AUTO: 1.1 % — HIGH (ref 0.1–0.3)
INR BLD: 1.03 RATIO — SIGNIFICANT CHANGE UP (ref 0.65–1.3)
LDH SERPL L TO P-CCNC: 249 — HIGH (ref 50–242)
LDH SERPL L TO P-CCNC: 316 — HIGH (ref 50–242)
LYMPHOCYTES # BLD AUTO: 18.5 % — LOW (ref 20.5–51.1)
LYMPHOCYTES # BLD AUTO: 18.6 % — LOW (ref 20.5–51.1)
LYMPHOCYTES # BLD AUTO: 2.15 K/UL — SIGNIFICANT CHANGE UP (ref 1.2–3.4)
LYMPHOCYTES # BLD AUTO: 2.35 K/UL — SIGNIFICANT CHANGE UP (ref 1.2–3.4)
LYMPHOCYTES # BLD AUTO: 2.72 K/UL — SIGNIFICANT CHANGE UP (ref 1.2–3.4)
LYMPHOCYTES # BLD AUTO: 20 % — LOW (ref 20.5–51.1)
MCHC RBC-ENTMCNC: 27.8 PG — SIGNIFICANT CHANGE UP (ref 27–31)
MCHC RBC-ENTMCNC: 28.3 PG — SIGNIFICANT CHANGE UP (ref 27–31)
MCHC RBC-ENTMCNC: 28.4 PG — SIGNIFICANT CHANGE UP (ref 27–31)
MCHC RBC-ENTMCNC: 33.1 G/DL — SIGNIFICANT CHANGE UP (ref 32–37)
MCHC RBC-ENTMCNC: 33.6 G/DL — SIGNIFICANT CHANGE UP (ref 32–37)
MCHC RBC-ENTMCNC: 34.5 G/DL — SIGNIFICANT CHANGE UP (ref 32–37)
MCV RBC AUTO: 82.2 FL — SIGNIFICANT CHANGE UP (ref 81–99)
MCV RBC AUTO: 83.9 FL — SIGNIFICANT CHANGE UP (ref 81–99)
MCV RBC AUTO: 84.7 FL — SIGNIFICANT CHANGE UP (ref 81–99)
MONOCYTES # BLD AUTO: 0.98 K/UL — HIGH (ref 0.1–0.6)
MONOCYTES # BLD AUTO: 1.05 K/UL — HIGH (ref 0.1–0.6)
MONOCYTES # BLD AUTO: 1.18 K/UL — HIGH (ref 0.1–0.6)
MONOCYTES NFR BLD AUTO: 8.1 % — SIGNIFICANT CHANGE UP (ref 1.7–9.3)
MONOCYTES NFR BLD AUTO: 8.4 % — SIGNIFICANT CHANGE UP (ref 1.7–9.3)
MONOCYTES NFR BLD AUTO: 8.9 % — SIGNIFICANT CHANGE UP (ref 1.7–9.3)
NEUTROPHILS # BLD AUTO: 10.49 K/UL — HIGH (ref 1.4–6.5)
NEUTROPHILS # BLD AUTO: 8.08 K/UL — HIGH (ref 1.4–6.5)
NEUTROPHILS # BLD AUTO: 8.33 K/UL — HIGH (ref 1.4–6.5)
NEUTROPHILS NFR BLD AUTO: 68.6 % — SIGNIFICANT CHANGE UP (ref 42.2–75.2)
NEUTROPHILS NFR BLD AUTO: 71.5 % — SIGNIFICANT CHANGE UP (ref 42.2–75.2)
NEUTROPHILS NFR BLD AUTO: 71.8 % — SIGNIFICANT CHANGE UP (ref 42.2–75.2)
NRBC # BLD: 0 /100 WBCS — SIGNIFICANT CHANGE UP (ref 0–0)
PLATELET # BLD AUTO: 138 K/UL — SIGNIFICANT CHANGE UP (ref 130–400)
PLATELET # BLD AUTO: 171 K/UL — SIGNIFICANT CHANGE UP (ref 130–400)
PLATELET # BLD AUTO: 181 K/UL — SIGNIFICANT CHANGE UP (ref 130–400)
POTASSIUM SERPL-MCNC: 3 MMOL/L — LOW (ref 3.5–5)
POTASSIUM SERPL-MCNC: 3.7 MMOL/L — SIGNIFICANT CHANGE UP (ref 3.5–5)
POTASSIUM SERPL-MCNC: 3.8 MMOL/L — SIGNIFICANT CHANGE UP (ref 3.5–5)
POTASSIUM SERPL-SCNC: 3 MMOL/L — LOW (ref 3.5–5)
POTASSIUM SERPL-SCNC: 3.7 MMOL/L — SIGNIFICANT CHANGE UP (ref 3.5–5)
POTASSIUM SERPL-SCNC: 3.8 MMOL/L — SIGNIFICANT CHANGE UP (ref 3.5–5)
PROT SERPL-MCNC: 4 G/DL — LOW (ref 6–8)
PROT SERPL-MCNC: 5 G/DL — LOW (ref 6–8)
PROT SERPL-MCNC: 5.1 G/DL — LOW (ref 6–8)
PROTHROM AB SERPL-ACNC: 11.7 SEC — SIGNIFICANT CHANGE UP (ref 9.95–12.87)
RBC # BLD: 2.86 M/UL — LOW (ref 4.2–5.4)
RBC # BLD: 3.13 M/UL — LOW (ref 4.2–5.4)
RBC # BLD: 3.42 M/UL — LOW (ref 4.2–5.4)
RBC # FLD: 13.2 % — SIGNIFICANT CHANGE UP (ref 11.5–14.5)
RBC # FLD: 13.3 % — SIGNIFICANT CHANGE UP (ref 11.5–14.5)
RBC # FLD: 13.3 % — SIGNIFICANT CHANGE UP (ref 11.5–14.5)
SODIUM SERPL-SCNC: 140 MMOL/L — SIGNIFICANT CHANGE UP (ref 135–146)
SODIUM SERPL-SCNC: 141 MMOL/L — SIGNIFICANT CHANGE UP (ref 135–146)
SODIUM SERPL-SCNC: 142 MMOL/L — SIGNIFICANT CHANGE UP (ref 135–146)
URATE SERPL-MCNC: 6.1 MG/DL — SIGNIFICANT CHANGE UP (ref 2.5–7)
URATE SERPL-MCNC: 6.8 MG/DL — SIGNIFICANT CHANGE UP (ref 2.5–7)
WBC # BLD: 11.64 K/UL — HIGH (ref 4.8–10.8)
WBC # BLD: 11.77 K/UL — HIGH (ref 4.8–10.8)
WBC # BLD: 14.61 K/UL — HIGH (ref 4.8–10.8)
WBC # FLD AUTO: 11.64 K/UL — HIGH (ref 4.8–10.8)
WBC # FLD AUTO: 11.77 K/UL — HIGH (ref 4.8–10.8)
WBC # FLD AUTO: 14.61 K/UL — HIGH (ref 4.8–10.8)

## 2022-10-05 RX ORDER — NIFEDIPINE 30 MG
30 TABLET, EXTENDED RELEASE 24 HR ORAL DAILY
Refills: 0 | Status: DISCONTINUED | OUTPATIENT
Start: 2022-10-05 | End: 2022-10-05

## 2022-10-05 RX ORDER — POTASSIUM CHLORIDE 20 MEQ
40 PACKET (EA) ORAL ONCE
Refills: 0 | Status: DISCONTINUED | OUTPATIENT
Start: 2022-10-05 | End: 2022-10-05

## 2022-10-05 RX ORDER — SODIUM CHLORIDE 9 MG/ML
1000 INJECTION, SOLUTION INTRAVENOUS
Refills: 0 | Status: DISCONTINUED | OUTPATIENT
Start: 2022-10-05 | End: 2022-10-05

## 2022-10-05 RX ORDER — NIFEDIPINE 30 MG
60 TABLET, EXTENDED RELEASE 24 HR ORAL EVERY 24 HOURS
Refills: 0 | Status: DISCONTINUED | OUTPATIENT
Start: 2022-10-06 | End: 2022-10-08

## 2022-10-05 RX ORDER — SODIUM CHLORIDE 9 MG/ML
1000 INJECTION, SOLUTION INTRAVENOUS
Refills: 0 | Status: DISCONTINUED | OUTPATIENT
Start: 2022-10-05 | End: 2022-10-08

## 2022-10-05 RX ORDER — ACETAMINOPHEN 500 MG
650 TABLET ORAL EVERY 6 HOURS
Refills: 0 | Status: DISCONTINUED | OUTPATIENT
Start: 2022-10-05 | End: 2022-10-08

## 2022-10-05 RX ORDER — CALCIUM CARBONATE 500(1250)
1 TABLET ORAL EVERY 4 HOURS
Refills: 0 | Status: DISCONTINUED | OUTPATIENT
Start: 2022-10-05 | End: 2022-10-08

## 2022-10-05 RX ORDER — IBUPROFEN 200 MG
600 TABLET ORAL ONCE
Refills: 0 | Status: COMPLETED | OUTPATIENT
Start: 2022-10-05 | End: 2022-10-05

## 2022-10-05 RX ORDER — MAGNESIUM SULFATE 500 MG/ML
2 VIAL (ML) INJECTION
Qty: 40 | Refills: 0 | Status: DISCONTINUED | OUTPATIENT
Start: 2022-10-05 | End: 2022-10-06

## 2022-10-05 RX ORDER — MAGNESIUM SULFATE 500 MG/ML
4 VIAL (ML) INJECTION ONCE
Refills: 0 | Status: COMPLETED | OUTPATIENT
Start: 2022-10-05 | End: 2022-10-05

## 2022-10-05 RX ORDER — SODIUM CHLORIDE 9 MG/ML
3 INJECTION INTRAMUSCULAR; INTRAVENOUS; SUBCUTANEOUS EVERY 8 HOURS
Refills: 0 | Status: DISCONTINUED | OUTPATIENT
Start: 2022-10-05 | End: 2022-10-08

## 2022-10-05 RX ORDER — NIFEDIPINE 30 MG
10 TABLET, EXTENDED RELEASE 24 HR ORAL ONCE
Refills: 0 | Status: DISCONTINUED | OUTPATIENT
Start: 2022-10-05 | End: 2022-10-05

## 2022-10-05 RX ORDER — ENOXAPARIN SODIUM 100 MG/ML
40 INJECTION SUBCUTANEOUS ONCE
Refills: 0 | Status: COMPLETED | OUTPATIENT
Start: 2022-10-05 | End: 2022-10-05

## 2022-10-05 RX ORDER — LABETALOL HCL 100 MG
20 TABLET ORAL ONCE
Refills: 0 | Status: COMPLETED | OUTPATIENT
Start: 2022-10-05 | End: 2022-10-05

## 2022-10-05 RX ORDER — SIMETHICONE 80 MG/1
80 TABLET, CHEWABLE ORAL ONCE
Refills: 0 | Status: COMPLETED | OUTPATIENT
Start: 2022-10-05 | End: 2022-10-05

## 2022-10-05 RX ORDER — NIFEDIPINE 30 MG
60 TABLET, EXTENDED RELEASE 24 HR ORAL ONCE
Refills: 0 | Status: COMPLETED | OUTPATIENT
Start: 2022-10-05 | End: 2022-10-05

## 2022-10-05 RX ADMIN — OXYCODONE HYDROCHLORIDE 5 MILLIGRAM(S): 5 TABLET ORAL at 10:04

## 2022-10-05 RX ADMIN — SODIUM CHLORIDE 75 MILLILITER(S): 9 INJECTION, SOLUTION INTRAVENOUS at 00:05

## 2022-10-05 RX ADMIN — SENNA PLUS 2 TABLET(S): 8.6 TABLET ORAL at 22:03

## 2022-10-05 RX ADMIN — Medication 60 MILLIGRAM(S): at 18:11

## 2022-10-05 RX ADMIN — SIMETHICONE 80 MILLIGRAM(S): 80 TABLET, CHEWABLE ORAL at 06:00

## 2022-10-05 RX ADMIN — Medication 600 MILLIGRAM(S): at 15:34

## 2022-10-05 RX ADMIN — SODIUM CHLORIDE 3 MILLILITER(S): 9 INJECTION INTRAMUSCULAR; INTRAVENOUS; SUBCUTANEOUS at 14:00

## 2022-10-05 RX ADMIN — ENOXAPARIN SODIUM 40 MILLIGRAM(S): 100 INJECTION SUBCUTANEOUS at 11:41

## 2022-10-05 RX ADMIN — Medication 30 MILLILITER(S): at 22:36

## 2022-10-05 RX ADMIN — SODIUM CHLORIDE 3 MILLILITER(S): 9 INJECTION INTRAMUSCULAR; INTRAVENOUS; SUBCUTANEOUS at 21:57

## 2022-10-05 RX ADMIN — SIMETHICONE 80 MILLIGRAM(S): 80 TABLET, CHEWABLE ORAL at 00:04

## 2022-10-05 RX ADMIN — SIMETHICONE 80 MILLIGRAM(S): 80 TABLET, CHEWABLE ORAL at 14:25

## 2022-10-05 RX ADMIN — SIMETHICONE 80 MILLIGRAM(S): 80 TABLET, CHEWABLE ORAL at 19:58

## 2022-10-05 RX ADMIN — Medication 1 TABLET(S): at 14:22

## 2022-10-05 RX ADMIN — Medication 300 GRAM(S): at 18:01

## 2022-10-05 RX ADMIN — Medication 20 MILLIGRAM(S): at 17:22

## 2022-10-05 RX ADMIN — Medication 50 GM/HR: at 18:34

## 2022-10-05 RX ADMIN — LEVETIRACETAM 400 MILLIGRAM(S): 250 TABLET, FILM COATED ORAL at 01:00

## 2022-10-05 RX ADMIN — OXYCODONE HYDROCHLORIDE 5 MILLIGRAM(S): 5 TABLET ORAL at 00:04

## 2022-10-05 RX ADMIN — SODIUM CHLORIDE 25 MILLILITER(S): 9 INJECTION, SOLUTION INTRAVENOUS at 22:17

## 2022-10-05 RX ADMIN — Medication 650 MILLIGRAM(S): at 11:39

## 2022-10-05 RX ADMIN — SODIUM CHLORIDE 75 MILLILITER(S): 9 INJECTION, SOLUTION INTRAVENOUS at 19:23

## 2022-10-05 RX ADMIN — OXYCODONE HYDROCHLORIDE 5 MILLIGRAM(S): 5 TABLET ORAL at 03:40

## 2022-10-05 NOTE — PROGRESS NOTE ADULT - ASSESSMENT
A/P: 26yo P1 s/p primary LTCS for arrest of dilation, POD#2, , pre-eclampsia w/ severe features, s/p multiple IV antihypertensive pushes, on keppra for siezure prophylaxis, with post op anuria now resolved and intrinsic EVELIO stable,   - continue routine post op care  - nephro consult: switch NS to D5W with 150 meq bicarb at 75 cc/hr, Trend Cr, K, (if K>5 start lokelma 10mg BID), lactate, get VBG, check CPK, keep nuñez with strict i/os, no need for RRT as of now  - mag discontinued to due evelio (Fena suggestive of intrinsic injury), started on keppra 500BID for seizure prophylaxis  - nuñez in place will continue to monitor UO  - s/p 20mg of IV lasix x2   - f/u blood pressures currently in the normal range on procardia 30 XL  - f/u AM labs  - encourage ambulation, PO hydration  - monitor vitals, bleeding   - simethicone/senna  - DVT prophylaxis: heparin + scds  - pain mgmt prn       Dr. Palm at bedside   A/P: 28yo P1 s/p primary LTCS for arrest of dilation, POD#2, , pre-eclampsia w/ severe features, s/p multiple IV antihypertensive pushes, on keppra for siezure prophylaxis, with post op anuria now resolved and intrinsic EVELIO stable,   - continue routine post op care  - nephro consult: switch NS to D5W with 150 meq bicarb at 75 cc/hr, Trend Cr, K, (if K>5 start lokelma 10mg BID), lactate, get VBG, check CPK, keep nuñez with strict i/os, no need for RRT as of now  - mag discontinued to due evelio (Fena suggestive of intrinsic injury), started on keppra 500BID for seizure prophylaxis  - nuñez in place will continue to monitor UO  - s/p 20mg of IV lasix x2   - f/u blood pressures currently in the normal range on procardia 30 XL  - f/u AM labs  - encourage ambulation, PO hydration  - monitor vitals, bleeding   - simethicone/senna  - DVT prophylaxis: heparin + scds  - pain mgmt prn       OB attending to be made aware

## 2022-10-05 NOTE — PROGRESS NOTE ADULT - ASSESSMENT
28yo P1 with preeclampsia with severe features s/p  on 10/3, hospital stay complicated by EVELIO with anuria not improving after lasix iv. Nephro eval for EVELIO and anuria.    Assessment and plan  EVELIO/ metabolic acidosis/ Preeclampsia s/p  on 10/3  EVELIO resolved, creatinine back to baseline  good UO  d/c IVF  if BP remains elevated after fluids are stopped start nifedipine 30 daily  US kidneys no hydro  HAGMA secondary to lactic acidosis- resolved  will sign off, recall prn 28yo P1 with preeclampsia with severe features s/p  on 10/3, hospital stay complicated by EVELIO with anuria not improving after lasix iv. Nephro eval for EVELIO and anuria.    Assessment and plan  EVELIO/ metabolic acidosis/ Preeclampsia s/p  on 10/3  EVELIO resolved, creatinine back to baseline  good UO  d/c IVF  if BP remains elevated after fluids are stopped start nifedipine xl 30 daily  US kidneys no hydro  HAGMA secondary to lactic acidosis- resolved  will sign off, recall prn

## 2022-10-05 NOTE — PROGRESS NOTE ADULT - SUBJECTIVE AND OBJECTIVE BOX
Subjective:   Pt evaluated at bedside for magnesium check. She denies HA, changes in vision, or right upper quadrant pain. Denies CP, SOB, N/V. Reports worsening BL LE swelling. No calf tenderness.   Pain controlled.    Objective:   T(F): 97.7 (10-05 @ 19:02), Max: 98.24 (10-05 @ 11:15)  HR: 106 (10-05 @ 21:56)  BP: 132/73 (10-05 @ 22:01) (124/57 - 190/100)  RR: 16 (10-05 @ 21:01)  SpO2: 94% (10-05 @ 22:01)  Vital Signs Last 24 Hrs  BP: 132/73 (05 Oct 2022 22:01) (91/55 - 190/100)    10-05 @ 07:01  -  10-05 @ 22:03  --------------------------------------------------------  IN: 650 mL / OUT: 2030 mL / NET: -1380 mL      Gen: NAD, AAOx3  CV: RRR, no M/R/G  Pulm: CTAB, no R/R/W  Abd: soft, gravid, nontender, no rebound or guarding, no epigastric tenderness, liver nonpalpable +BS.   : Stewart   Ext: +2 edema molly, SCDs in place  Neuro: 2+ BL upper extremity reflexes    Medications:  MEDICATIONS  (STANDING):  diphtheria/tetanus/pertussis (acellular) Vaccine (ADAcel) 0.5 milliLiter(s) IntraMuscular once  lactated ringers. 1000 milliLiter(s) (25 mL/Hr) IV Continuous <Continuous>  magnesium sulfate Infusion 2 Gm/Hr (50 mL/Hr) IV Continuous <Continuous>  senna 2 Tablet(s) Oral at bedtime  simethicone 80 milliGRAM(s) Chew every 6 hours  sodium chloride 0.9% lock flush 3 milliLiter(s) IV Push every 8 hours    MEDICATIONS  (PRN):  acetaminophen     Tablet .. 650 milliGRAM(s) Oral every 6 hours PRN Moderate Pain (4 - 6), Severe Pain (7 - 10)  calcium carbonate    500 mG (Tums) Chewable 1 Tablet(s) Chew every 4 hours PRN Heartburn  citric acid/sodium citrate Solution 30 milliLiter(s) Oral every 6 hours PRN heartburn  diphenhydrAMINE 25 milliGRAM(s) Oral every 6 hours PRN Pruritus  lanolin Ointment 1 Application(s) Topical every 6 hours PRN Sore Nipples  magnesium hydroxide Suspension 30 milliLiter(s) Oral two times a day PRN Constipation  oxyCODONE    IR 5 milliGRAM(s) Oral once PRN Moderate to Severe Pain (4-10)  oxyCODONE    IR 5 milliGRAM(s) Oral every 3 hours PRN Moderate to Severe Pain (4-10)      No Known Allergies      Labs:                        8.9    11.77 )-----------( 171      ( 05 Oct 2022 18:18 )             26.5     10-05    141  |  101  |  6<L>  ----------------------------<  68<L>  3.8   |  29  |  0.7    Ca    7.8<L>      05 Oct 2022 18:18  Mg     6.3     10-03    TPro  5.1<L>  /  Alb  2.6<L>  /  TBili  0.3  /  DBili  x   /  AST  17  /  ALT  7   /  AlkPhos  228<H>  10-05    Uric Acid, Serum: 6.8 mg/dL (10-05 @ 18:18)  Uric Acid, Serum: 6.1 mg/dL (10-05 @ 07:20)    Urine output:   Subjective:   Pt evaluated at bedside for magnesium check. She denies HA, changes in vision, or right upper quadrant pain. Denies CP, SOB, N/V. Reports worsening BL LE swelling. No calf tenderness.   Pain controlled.    Objective:   T(F): 97.7 (10-05 @ 19:02), Max: 98.24 (10-05 @ 11:15)  HR: 106 (10-05 @ 21:56)  BP: 132/73 (10-05 @ 22:01) (124/57 - 190/100)  RR: 16 (10-05 @ 21:01)  SpO2: 94% (10-05 @ 22:01)  Vital Signs Last 24 Hrs  BP: 132/73 (05 Oct 2022 22:01) (91/55 - 190/100)    10-05 @ 07:01  -  10-05 @ 22:03  --------------------------------------------------------  IN: 650 mL / OUT: 2030 mL / NET: -1380 mL      Gen: NAD, AAOx3  CV: RRR, no M/R/G  Pulm: CTAB, no R/R/W  Abd: soft, gravid, nontender, no rebound or guarding, no epigastric tenderness, liver nonpalpable +BS.   : voiding  Ext: +2 edema molly, SCDs in place  Neuro: 2+ BL upper extremity reflexes    Medications:  MEDICATIONS  (STANDING):  diphtheria/tetanus/pertussis (acellular) Vaccine (ADAcel) 0.5 milliLiter(s) IntraMuscular once  lactated ringers. 1000 milliLiter(s) (25 mL/Hr) IV Continuous <Continuous>  magnesium sulfate Infusion 2 Gm/Hr (50 mL/Hr) IV Continuous <Continuous>  senna 2 Tablet(s) Oral at bedtime  simethicone 80 milliGRAM(s) Chew every 6 hours  sodium chloride 0.9% lock flush 3 milliLiter(s) IV Push every 8 hours    MEDICATIONS  (PRN):  acetaminophen     Tablet .. 650 milliGRAM(s) Oral every 6 hours PRN Moderate Pain (4 - 6), Severe Pain (7 - 10)  calcium carbonate    500 mG (Tums) Chewable 1 Tablet(s) Chew every 4 hours PRN Heartburn  citric acid/sodium citrate Solution 30 milliLiter(s) Oral every 6 hours PRN heartburn  diphenhydrAMINE 25 milliGRAM(s) Oral every 6 hours PRN Pruritus  lanolin Ointment 1 Application(s) Topical every 6 hours PRN Sore Nipples  magnesium hydroxide Suspension 30 milliLiter(s) Oral two times a day PRN Constipation  oxyCODONE    IR 5 milliGRAM(s) Oral once PRN Moderate to Severe Pain (4-10)  oxyCODONE    IR 5 milliGRAM(s) Oral every 3 hours PRN Moderate to Severe Pain (4-10)      No Known Allergies      Labs:                        8.9    11.77 )-----------( 171      ( 05 Oct 2022 18:18 )             26.5     10-05    141  |  101  |  6<L>  ----------------------------<  68<L>  3.8   |  29  |  0.7    Ca    7.8<L>      05 Oct 2022 18:18  Mg     6.3     10-03    TPro  5.1<L>  /  Alb  2.6<L>  /  TBili  0.3  /  DBili  x   /  AST  17  /  ALT  7   /  AlkPhos  228<H>  10-05    Uric Acid, Serum: 6.8 mg/dL (10-05 @ 18:18)  Uric Acid, Serum: 6.1 mg/dL (10-05 @ 07:20)    Urine output:

## 2022-10-05 NOTE — PROGRESS NOTE ADULT - SUBJECTIVE AND OBJECTIVE BOX
JANET HUTCHISON  27y  Female    PGY3 Note:    Pt is POD#2. Pt is recovering well, no acute complaints. Pt denies heavy vaginal bleeding, pain well controlled on PO medication. Patient has not yet ambulated or passed flatus.  Patient is tolerating a regular diet. Bottle feeding.  Denies headache, chest pain, SOB, fever, chills, nausea, vomiting, extermity pain or swelling.         PAST MEDICAL & SURGICAL HISTORY:  No pertinent past medical history  S/P dilation and curettage      Physical Exam  Vital Signs Last 24 Hrs  T(C): 36.7 (04 Oct 2022 20:00), Max: 36.8 (04 Oct 2022 09:29)  T(F): 98.1 (04 Oct 2022 20:00), Max: 98.2 (04 Oct 2022 09:29)  HR: 93 (05 Oct 2022 06:30) (56 - 103)  BP: 114/71 (05 Oct 2022 06:30) (76/48 - 196/92)  RR: 16 (04 Oct 2022 13:15) (16 - 18)  SpO2: 99% (05 Oct 2022 06:29) (76% - 100%)      Gen: AAOx3, NAD  Heart: RRR, no M/R/G  Lungs: CTAB  Fundus: firm, below umbilicus, nontender   Wound: Pfannenstiel incision, steris in place c/d/i   Abd: Soft, appropriately tender near incision site, mildly distended, BS+  Lochia: minimal  Ext: No calf tenderness, no swelling    Labs:                        9.0    16.42 )-----------( 150      ( 04 Oct 2022 17:45 )             26.1                         10.9   19.10 )-----------( 134      ( 04 Oct 2022 12:17 )             32.0          [ ] f/u UOmin 58cc/hr:(4311-8858) 0cc --> 1000cc bolus with 20mg IV lasix --> (3279-8763) 5cc, (9582-7029)1000cc, nuñez replaced at 1200 and 350cc more drained (6251-0971) 450cc, (3073-3330) 75cc (4161-1707) 75cc (1281-5829) 225cc (8407-5504) 275cc (3517-3199) 275cc (9319-3591) 250cc (2843-5746) 800cc    MEDICATIONS  (STANDING):  dextrose 5% 1000 milliLiter(s) (75 mL/Hr) IV Continuous <Continuous>  diphtheria/tetanus/pertussis (acellular) Vaccine (ADAcel) 0.5 milliLiter(s) IntraMuscular once  heparin   Injectable 5000 Unit(s) SubCutaneous every 12 hours  levETIRAcetam  IVPB 500 milliGRAM(s) IV Intermittent every 12 hours  NIFEdipine XL 30 milliGRAM(s) Oral daily  senna 2 Tablet(s) Oral at bedtime  simethicone 80 milliGRAM(s) Chew every 6 hours    MEDICATIONS  (PRN):  citric acid/sodium citrate Solution 30 milliLiter(s) Oral every 6 hours PRN heartburn  diphenhydrAMINE 25 milliGRAM(s) Oral every 6 hours PRN Pruritus  lanolin Ointment 1 Application(s) Topical every 6 hours PRN Sore Nipples  magnesium hydroxide Suspension 30 milliLiter(s) Oral two times a day PRN Constipation  oxyCODONE    IR 5 milliGRAM(s) Oral once PRN Moderate to Severe Pain (4-10)  oxyCODONE    IR 5 milliGRAM(s) Oral every 3 hours PRN Moderate to Severe Pain (4-10)     JANET HUTCHISON  27y  Female    PGY3 Note:    Pt is POD#2. Pt is recovering well, no acute complaints. Pt denies heavy vaginal bleeding, pain well controlled on PO medication. Patient has ambulated to the chair and tolerated a regular diet. Patient has not yet passed flatus or stool.   Bottle feeding.  Denies headache, chest pain, SOB, fever, chills, nausea, vomiting, extermity pain or swelling.         PAST MEDICAL & SURGICAL HISTORY:  No pertinent past medical history  S/P dilation and curettage      Physical Exam  Vital Signs Last 24 Hrs  T(C): 36.7 (04 Oct 2022 20:00), Max: 36.8 (04 Oct 2022 09:29)  T(F): 98.1 (04 Oct 2022 20:00), Max: 98.2 (04 Oct 2022 09:29)  HR: 93 (05 Oct 2022 06:30) (56 - 103)  BP: 114/71 (05 Oct 2022 06:30) (76/48 - 196/92)  RR: 16 (04 Oct 2022 13:15) (16 - 18)  SpO2: 99% (05 Oct 2022 06:29) (76% - 100%)      Gen: AAOx3, NAD  Heart: RRR, no M/R/G  Lungs: CTAB  Fundus: firm, below umbilicus, nontender   Wound: Pfannenstiel incision, steris in place c/d/i   Abd: Soft, appropriately tender near incision site, mildly distended, BS+  Lochia: minimal  Ext: No calf tenderness, 2+ edema in LE swelling    Output  [ ] f/u UOmin 58cc/hr:(0772-0631) 0cc --> 1000cc bolus with 20mg IV lasix --> (2143-1315) 5cc, (6706-4087)1000cc, nuñez replaced at 1200 and 350cc more drained (2590-3543) 450cc, (6683-5353) 75cc (9796-5208) 75cc (4822-4363) 225cc (2390-3035) 275cc (7337-5853) 275cc (7659-6774) 250cc (9642-6934) 800cc    Labs:                        9.0    16.42 )-----------( 150      ( 04 Oct 2022 17:45 )             26.1                         10.9   19.10 )-----------( 134      ( 04 Oct 2022 12:17 )             32.0          Additional labs  10/3 7.52>11.2/32.2<183, PT/INR 11.2/0.98, PTT 24.6, fib >700, 139/3.4/104/24/<3/0.7<70, AST/ALT 22/11, uric acid 6.3, ,  prot, Upr/cr 0.7; buprenorphine pos [ ] f/u confirmatory   @1200 9.84>11/31.8<181, 138/3.3/102/21/<3/0.8<83, AST/ALT: 23/11, Uric acid: 6.9, LDH: 308, mag 4.1  @2239 19.57>11.3/33.6<188, 143/4.2/105/18/20/4/1.3<104, AST/ALT 26/10, , uric acid 6.7, Mg 6.3, FeNa 1.8% (intrinsic)  10/4 @0300 23.65>10.1/29<181, 132/3.8/98/15/4/1.3<132, AST/ALT 29/9, Uric acid 6.8, , PT/PTT/INR 10.40/33.6/0.9, Fib X, Lactate 4.2  @1200 VBG lactate: 2.5, ph 7.41, Na 133, K: 3.5,   19.10>10.9/32.0<134, OH 10.80, INR 0.95, aPTT 30.9, 137/3.6/100/23/5/1.4<87, uric acid 7.1, , lactate 2.9  @1800 16.42>9.0/26.1<150, 138/3.5/100/24/6/1.2<99, AST/ALT 19/7, uric acid 6.9, , lactate 2.6,   10/5 0600 ordered    Imaging    10/4   renal sonogram: neg  chest X ray: neg  echo: LVEF 55-60%, normal LV function      MEDICATIONS  (STANDING):  dextrose 5% 1000 milliLiter(s) (75 mL/Hr) IV Continuous <Continuous>  diphtheria/tetanus/pertussis (acellular) Vaccine (ADAcel) 0.5 milliLiter(s) IntraMuscular once  heparin   Injectable 5000 Unit(s) SubCutaneous every 12 hours  levETIRAcetam  IVPB 500 milliGRAM(s) IV Intermittent every 12 hours  NIFEdipine XL 30 milliGRAM(s) Oral daily  senna 2 Tablet(s) Oral at bedtime  simethicone 80 milliGRAM(s) Chew every 6 hours    MEDICATIONS  (PRN):  citric acid/sodium citrate Solution 30 milliLiter(s) Oral every 6 hours PRN heartburn  diphenhydrAMINE 25 milliGRAM(s) Oral every 6 hours PRN Pruritus  lanolin Ointment 1 Application(s) Topical every 6 hours PRN Sore Nipples  magnesium hydroxide Suspension 30 milliLiter(s) Oral two times a day PRN Constipation  oxyCODONE    IR 5 milliGRAM(s) Oral once PRN Moderate to Severe Pain (4-10)  oxyCODONE    IR 5 milliGRAM(s) Oral every 3 hours PRN Moderate to Severe Pain (4-10)

## 2022-10-05 NOTE — PROGRESS NOTE ADULT - SUBJECTIVE AND OBJECTIVE BOX
SUBJECTIVE:    Patient is a 27y old Female who presents with a chief complaint of contractions and elevated pressures (03 Oct 2022 11:36)    Overnight Events: Patient seen at bedside, doing much better than yesterday.  VS shows elevated BP, complaining of abdominal pain, UO improving.    PAST MEDICAL & SURGICAL HISTORY  No pertinent past medical history    S/P dilation and curettage      SOCIAL HISTORY:  Negative for smoking/alcohol/drug use.     ALLERGIES:  No Known Allergies    MEDICATIONS:  STANDING MEDICATIONS  diphtheria/tetanus/pertussis (acellular) Vaccine (ADAcel) 0.5 milliLiter(s) IntraMuscular once  enoxaparin Injectable 40 milliGRAM(s) SubCutaneous once  NIFEdipine XL 30 milliGRAM(s) Oral daily  senna 2 Tablet(s) Oral at bedtime  simethicone 80 milliGRAM(s) Chew every 6 hours  sodium chloride 0.9% lock flush 3 milliLiter(s) IV Push every 8 hours    PRN MEDICATIONS  acetaminophen     Tablet .. 650 milliGRAM(s) Oral every 6 hours PRN  calcium carbonate    500 mG (Tums) Chewable 1 Tablet(s) Chew every 4 hours PRN  citric acid/sodium citrate Solution 30 milliLiter(s) Oral every 6 hours PRN  diphenhydrAMINE 25 milliGRAM(s) Oral every 6 hours PRN  lanolin Ointment 1 Application(s) Topical every 6 hours PRN  magnesium hydroxide Suspension 30 milliLiter(s) Oral two times a day PRN  oxyCODONE    IR 5 milliGRAM(s) Oral once PRN  oxyCODONE    IR 5 milliGRAM(s) Oral every 3 hours PRN    VITALS:   T(F): 98.1, Max: 98.1 (10-04-22 @ 20:00)  HR: 91 (30 - 108)  BP: 149/82 (76/48 - 196/92)  RR: 16 (16 - 18)  SpO2: 100% (76% - 100%)    LABS:                        9.5    14.61 )-----------( 181      ( 05 Oct 2022 09:26 )             28.7     10-05    140  |  102  |  6<L>  ----------------------------<  81  3.7   |  29  |  0.8    Ca    7.5<L>      05 Oct 2022 09:26  Mg     6.3     10-03    TPro  5.0<L>  /  Alb  2.8<L>  /  TBili  0.3  /  DBili  x   /  AST  17  /  ALT  7   /  AlkPhos  224<H>  10-05    PT/INR - ( 05 Oct 2022 07:20 )   PT: 11.70 sec;   INR: 1.03 ratio         PTT - ( 05 Oct 2022 07:20 )  PTT:29.5 sec      Creatine Kinase, Serum: 370 U/L *H* (10-04-22 @ 18:05)  Lactate, Blood: 2.6 mmol/L *H* (10-04-22 @ 17:45)  Lactate, Blood: 2.9 mmol/L *H* (10-04-22 @ 12:17)      CARDIAC MARKERS ( 04 Oct 2022 18:05 )  x     / x     / 370 U/L / x     / x              10-04-22 @ 07:01  -  10-05-22 @ 07:00  --------------------------------------------------------  IN: 3205 mL / OUT: 4790 mL / NET: -1585 mL    10-05-22 @ 07:01  -  10-05-22 @ 11:38  --------------------------------------------------------  IN: 225 mL / OUT: 350 mL / NET: -125 mL          PHYSICAL EXAM:  GEN: NAD, comfortable  LUNGS: CTAB, no w/r/r  HEART: RRR, s1 and s2 appreciated, no m/r/g  ABD: soft, NT/ND, +BS  EXT: no edema, PP b/l  NEURO: AAOX3

## 2022-10-05 NOTE — PROGRESS NOTE ADULT - ASSESSMENT
A/P 28yo P1 s/p primary LTCS for arrest of dilation, POD#2, , pre-eclampsia w/ severe features, s/p multiple IV antihypertensive pushes, on keppra for siezure prophylaxis, with post op anuria now resolved and intrinsic EVELIO stable, continued severe range BPs    - Per MFM, restart magnesium for seizure ppx given improved Cr  -continue magnesium until 10/6 @1600  - IVF  - monitor I&Os  - neuro check q2hr   - f/u PELs @0000  - simethicone/senna for gas pain and constipation.       Dr. Campbell and Dr. Gonzalez aware

## 2022-10-05 NOTE — CHART NOTE - NSCHARTNOTEFT_GEN_A_CORE
PGY1 note:   Patient noted to have severe blood pressure of 190/110 @1710, received a push of 20mg Labetalol.    Patient has not passed flatus since c/s pod#2, complaining of some abdominal pressure.  Denies headaches, changes in vision, RUQ/epigastric pain, n/v, new onset LE pain/swelling.      Spoke to M, per M attending patient was started on magnesium at 1800.      Objective:   Vital Signs Last 24 Hrs  T(C): 36.5 (05 Oct 2022 19:02), Max: 36.8 (05 Oct 2022 11:15)  T(F): 97.7 (05 Oct 2022 19:02), Max: 98.24 (05 Oct 2022 11:15)  HR: 81 (05 Oct 2022 19:33) (30 - 108)  BP: 150/82 (05 Oct 2022 19:33) (91/55 - 196/92)  RR: 18 (05 Oct 2022 19:02) (16 - 18)  SpO2: 97% (05 Oct 2022 19:33) (76% - 100%)    Gen: AOx3, no acute distress  CVS: RRR, s1, s2  Lungs: CTABL  Abd: tense, tympanic, no RUQ/epigastric pain, appropriate tenderness at level of uterine fundus  Ext: 1+ edema in LE, nontender  Neuro: 2+ brachial reflexes bilaterally                           8.9    11.77 )-----------( 171      ( 05 Oct 2022 18:18 )             26.5   10-05    141  |  101  |  6<L>  ----------------------------<  68<L>  3.8   |  29  |  0.7    Ca    7.8<L>      05 Oct 2022 18:18  Mg     6.3     10-03    TPro  5.1<L>  /  Alb  2.6<L>  /  TBili  0.3  /  DBili  x   /  AST  17  /  ALT  7   /  AlkPhos  228<H>  10-05      Plan: 28yo P1 s/p primary LTCS for arrest of dilation, POD#2, , pre-eclampsia w/ severe features, s/p multiple IV antihypertensive pushes, on keppra for siezure prophylaxis, with post op anuria now resolved and intrinsic EVELIO stable  - start magnesium for seizure ppx  - IVF  - monitor I&Os  - neuro check q2hr   - f/u PELs @0000  - simethicone/senna for gas pain and constipation.     Dr. Enriquez and Dr. Gonzalez aware.

## 2022-10-06 LAB
ALBUMIN SERPL ELPH-MCNC: 2.7 G/DL — LOW (ref 3.5–5.2)
ALBUMIN SERPL ELPH-MCNC: 2.9 G/DL — LOW (ref 3.5–5.2)
ALP SERPL-CCNC: 220 U/L — HIGH (ref 30–115)
ALP SERPL-CCNC: 236 U/L — HIGH (ref 30–115)
ALT FLD-CCNC: 6 U/L — SIGNIFICANT CHANGE UP (ref 0–41)
ALT FLD-CCNC: 8 U/L — SIGNIFICANT CHANGE UP (ref 0–41)
ANION GAP SERPL CALC-SCNC: 10 MMOL/L — SIGNIFICANT CHANGE UP (ref 7–14)
ANION GAP SERPL CALC-SCNC: 10 MMOL/L — SIGNIFICANT CHANGE UP (ref 7–14)
APTT BLD: 27.1 SEC — SIGNIFICANT CHANGE UP (ref 27–39.2)
AST SERPL-CCNC: 16 U/L — SIGNIFICANT CHANGE UP (ref 0–41)
AST SERPL-CCNC: 17 U/L — SIGNIFICANT CHANGE UP (ref 0–41)
BASOPHILS # BLD AUTO: 0.02 K/UL — SIGNIFICANT CHANGE UP (ref 0–0.2)
BASOPHILS # BLD AUTO: 0.03 K/UL — SIGNIFICANT CHANGE UP (ref 0–0.2)
BASOPHILS # BLD AUTO: 0.03 K/UL — SIGNIFICANT CHANGE UP (ref 0–0.2)
BASOPHILS NFR BLD AUTO: 0.2 % — SIGNIFICANT CHANGE UP (ref 0–1)
BASOPHILS NFR BLD AUTO: 0.3 % — SIGNIFICANT CHANGE UP (ref 0–1)
BASOPHILS NFR BLD AUTO: 0.4 % — SIGNIFICANT CHANGE UP (ref 0–1)
BILIRUB SERPL-MCNC: 0.3 MG/DL — SIGNIFICANT CHANGE UP (ref 0.2–1.2)
BILIRUB SERPL-MCNC: 0.3 MG/DL — SIGNIFICANT CHANGE UP (ref 0.2–1.2)
BUN SERPL-MCNC: 5 MG/DL — LOW (ref 10–20)
BUN SERPL-MCNC: 6 MG/DL — LOW (ref 10–20)
CALCIUM SERPL-MCNC: 8.1 MG/DL — LOW (ref 8.4–10.5)
CALCIUM SERPL-MCNC: 8.3 MG/DL — LOW (ref 8.4–10.5)
CHLORIDE SERPL-SCNC: 102 MMOL/L — SIGNIFICANT CHANGE UP (ref 98–110)
CHLORIDE SERPL-SCNC: 99 MMOL/L — SIGNIFICANT CHANGE UP (ref 98–110)
CO2 SERPL-SCNC: 27 MMOL/L — SIGNIFICANT CHANGE UP (ref 17–32)
CO2 SERPL-SCNC: 27 MMOL/L — SIGNIFICANT CHANGE UP (ref 17–32)
CREAT SERPL-MCNC: 0.6 MG/DL — LOW (ref 0.7–1.5)
CREAT SERPL-MCNC: 0.7 MG/DL — SIGNIFICANT CHANGE UP (ref 0.7–1.5)
EGFR: 121 ML/MIN/1.73M2 — SIGNIFICANT CHANGE UP
EGFR: 125 ML/MIN/1.73M2 — SIGNIFICANT CHANGE UP
EOSINOPHIL # BLD AUTO: 0.19 K/UL — SIGNIFICANT CHANGE UP (ref 0–0.7)
EOSINOPHIL # BLD AUTO: 0.23 K/UL — SIGNIFICANT CHANGE UP (ref 0–0.7)
EOSINOPHIL # BLD AUTO: 0.23 K/UL — SIGNIFICANT CHANGE UP (ref 0–0.7)
EOSINOPHIL NFR BLD AUTO: 1.7 % — SIGNIFICANT CHANGE UP (ref 0–8)
EOSINOPHIL NFR BLD AUTO: 2.3 % — SIGNIFICANT CHANGE UP (ref 0–8)
EOSINOPHIL NFR BLD AUTO: 2.9 % — SIGNIFICANT CHANGE UP (ref 0–8)
FIBRINOGEN PPP-MCNC: >700 MG/DL — HIGH (ref 204.4–570.6)
GLUCOSE SERPL-MCNC: 87 MG/DL — SIGNIFICANT CHANGE UP (ref 70–99)
GLUCOSE SERPL-MCNC: 88 MG/DL — SIGNIFICANT CHANGE UP (ref 70–99)
HCT VFR BLD CALC: 27 % — LOW (ref 37–47)
HCT VFR BLD CALC: 29 % — LOW (ref 37–47)
HCT VFR BLD CALC: 29 % — LOW (ref 37–47)
HGB BLD-MCNC: 9.1 G/DL — LOW (ref 12–16)
HGB BLD-MCNC: 9.8 G/DL — LOW (ref 12–16)
HGB BLD-MCNC: 9.9 G/DL — LOW (ref 12–16)
IMM GRANULOCYTES NFR BLD AUTO: 0.9 % — HIGH (ref 0.1–0.3)
IMM GRANULOCYTES NFR BLD AUTO: 1.2 % — HIGH (ref 0.1–0.3)
IMM GRANULOCYTES NFR BLD AUTO: 1.2 % — HIGH (ref 0.1–0.3)
INR BLD: 0.91 RATIO — SIGNIFICANT CHANGE UP (ref 0.65–1.3)
LDH SERPL L TO P-CCNC: 296 — HIGH (ref 50–242)
LDH SERPL L TO P-CCNC: 314 — HIGH (ref 50–242)
LYMPHOCYTES # BLD AUTO: 1.74 K/UL — SIGNIFICANT CHANGE UP (ref 1.2–3.4)
LYMPHOCYTES # BLD AUTO: 1.82 K/UL — SIGNIFICANT CHANGE UP (ref 1.2–3.4)
LYMPHOCYTES # BLD AUTO: 15.7 % — LOW (ref 20.5–51.1)
LYMPHOCYTES # BLD AUTO: 2.11 K/UL — SIGNIFICANT CHANGE UP (ref 1.2–3.4)
LYMPHOCYTES # BLD AUTO: 20.8 % — SIGNIFICANT CHANGE UP (ref 20.5–51.1)
LYMPHOCYTES # BLD AUTO: 23 % — SIGNIFICANT CHANGE UP (ref 20.5–51.1)
MAGNESIUM SERPL-MCNC: 3.8 MG/DL — CRITICAL HIGH (ref 1.8–2.4)
MAGNESIUM SERPL-MCNC: 3.8 MG/DL — CRITICAL HIGH (ref 1.8–2.4)
MCHC RBC-ENTMCNC: 27.9 PG — SIGNIFICANT CHANGE UP (ref 27–31)
MCHC RBC-ENTMCNC: 28 PG — SIGNIFICANT CHANGE UP (ref 27–31)
MCHC RBC-ENTMCNC: 28.2 PG — SIGNIFICANT CHANGE UP (ref 27–31)
MCHC RBC-ENTMCNC: 33.7 G/DL — SIGNIFICANT CHANGE UP (ref 32–37)
MCHC RBC-ENTMCNC: 33.8 G/DL — SIGNIFICANT CHANGE UP (ref 32–37)
MCHC RBC-ENTMCNC: 34.1 G/DL — SIGNIFICANT CHANGE UP (ref 32–37)
MCV RBC AUTO: 82.2 FL — SIGNIFICANT CHANGE UP (ref 81–99)
MCV RBC AUTO: 82.8 FL — SIGNIFICANT CHANGE UP (ref 81–99)
MCV RBC AUTO: 83.6 FL — SIGNIFICANT CHANGE UP (ref 81–99)
MONOCYTES # BLD AUTO: 0.56 K/UL — SIGNIFICANT CHANGE UP (ref 0.1–0.6)
MONOCYTES # BLD AUTO: 0.65 K/UL — HIGH (ref 0.1–0.6)
MONOCYTES # BLD AUTO: 0.88 K/UL — HIGH (ref 0.1–0.6)
MONOCYTES NFR BLD AUTO: 6.4 % — SIGNIFICANT CHANGE UP (ref 1.7–9.3)
MONOCYTES NFR BLD AUTO: 7.1 % — SIGNIFICANT CHANGE UP (ref 1.7–9.3)
MONOCYTES NFR BLD AUTO: 7.9 % — SIGNIFICANT CHANGE UP (ref 1.7–9.3)
NEUTROPHILS # BLD AUTO: 5.21 K/UL — SIGNIFICANT CHANGE UP (ref 1.4–6.5)
NEUTROPHILS # BLD AUTO: 6.99 K/UL — HIGH (ref 1.4–6.5)
NEUTROPHILS # BLD AUTO: 8.11 K/UL — HIGH (ref 1.4–6.5)
NEUTROPHILS NFR BLD AUTO: 65.7 % — SIGNIFICANT CHANGE UP (ref 42.2–75.2)
NEUTROPHILS NFR BLD AUTO: 69.1 % — SIGNIFICANT CHANGE UP (ref 42.2–75.2)
NEUTROPHILS NFR BLD AUTO: 73.2 % — SIGNIFICANT CHANGE UP (ref 42.2–75.2)
NRBC # BLD: 0 /100 WBCS — SIGNIFICANT CHANGE UP (ref 0–0)
PLATELET # BLD AUTO: 162 K/UL — SIGNIFICANT CHANGE UP (ref 130–400)
PLATELET # BLD AUTO: 192 K/UL — SIGNIFICANT CHANGE UP (ref 130–400)
PLATELET # BLD AUTO: 205 K/UL — SIGNIFICANT CHANGE UP (ref 130–400)
POTASSIUM SERPL-MCNC: 3.5 MMOL/L — SIGNIFICANT CHANGE UP (ref 3.5–5)
POTASSIUM SERPL-MCNC: 3.6 MMOL/L — SIGNIFICANT CHANGE UP (ref 3.5–5)
POTASSIUM SERPL-SCNC: 3.5 MMOL/L — SIGNIFICANT CHANGE UP (ref 3.5–5)
POTASSIUM SERPL-SCNC: 3.6 MMOL/L — SIGNIFICANT CHANGE UP (ref 3.5–5)
PROT SERPL-MCNC: 5.5 G/DL — LOW (ref 6–8)
PROT SERPL-MCNC: 5.6 G/DL — LOW (ref 6–8)
PROTHROM AB SERPL-ACNC: 10.3 SEC — SIGNIFICANT CHANGE UP (ref 9.95–12.87)
RBC # BLD: 3.26 M/UL — LOW (ref 4.2–5.4)
RBC # BLD: 3.47 M/UL — LOW (ref 4.2–5.4)
RBC # BLD: 3.53 M/UL — LOW (ref 4.2–5.4)
RBC # FLD: 13.1 % — SIGNIFICANT CHANGE UP (ref 11.5–14.5)
RBC # FLD: 13.2 % — SIGNIFICANT CHANGE UP (ref 11.5–14.5)
RBC # FLD: 13.5 % — SIGNIFICANT CHANGE UP (ref 11.5–14.5)
SODIUM SERPL-SCNC: 136 MMOL/L — SIGNIFICANT CHANGE UP (ref 135–146)
SODIUM SERPL-SCNC: 139 MMOL/L — SIGNIFICANT CHANGE UP (ref 135–146)
SURGICAL PATHOLOGY STUDY: SIGNIFICANT CHANGE UP
URATE SERPL-MCNC: 6.4 MG/DL — SIGNIFICANT CHANGE UP (ref 2.5–7)
URATE SERPL-MCNC: 7 MG/DL — SIGNIFICANT CHANGE UP (ref 2.5–7)
WBC # BLD: 10.12 K/UL — SIGNIFICANT CHANGE UP (ref 4.8–10.8)
WBC # BLD: 11.08 K/UL — HIGH (ref 4.8–10.8)
WBC # BLD: 7.92 K/UL — SIGNIFICANT CHANGE UP (ref 4.8–10.8)
WBC # FLD AUTO: 10.12 K/UL — SIGNIFICANT CHANGE UP (ref 4.8–10.8)
WBC # FLD AUTO: 11.08 K/UL — HIGH (ref 4.8–10.8)
WBC # FLD AUTO: 7.92 K/UL — SIGNIFICANT CHANGE UP (ref 4.8–10.8)

## 2022-10-06 PROCEDURE — 99233 SBSQ HOSP IP/OBS HIGH 50: CPT

## 2022-10-06 RX ORDER — LEVETIRACETAM 250 MG/1
500 TABLET, FILM COATED ORAL
Refills: 0 | Status: DISCONTINUED | OUTPATIENT
Start: 2022-10-06 | End: 2022-10-08

## 2022-10-06 RX ORDER — HYDROCHLOROTHIAZIDE 25 MG
25 TABLET ORAL DAILY
Refills: 0 | Status: DISCONTINUED | OUTPATIENT
Start: 2022-10-06 | End: 2022-10-08

## 2022-10-06 RX ORDER — ENOXAPARIN SODIUM 100 MG/ML
40 INJECTION SUBCUTANEOUS EVERY 24 HOURS
Refills: 0 | Status: DISCONTINUED | OUTPATIENT
Start: 2022-10-06 | End: 2022-10-08

## 2022-10-06 RX ADMIN — LEVETIRACETAM 500 MILLIGRAM(S): 250 TABLET, FILM COATED ORAL at 18:44

## 2022-10-06 RX ADMIN — SIMETHICONE 80 MILLIGRAM(S): 80 TABLET, CHEWABLE ORAL at 11:56

## 2022-10-06 RX ADMIN — Medication 1 TABLET(S): at 00:26

## 2022-10-06 RX ADMIN — SODIUM CHLORIDE 3 MILLILITER(S): 9 INJECTION INTRAMUSCULAR; INTRAVENOUS; SUBCUTANEOUS at 22:00

## 2022-10-06 RX ADMIN — SIMETHICONE 80 MILLIGRAM(S): 80 TABLET, CHEWABLE ORAL at 18:44

## 2022-10-06 RX ADMIN — Medication 650 MILLIGRAM(S): at 01:11

## 2022-10-06 RX ADMIN — SIMETHICONE 80 MILLIGRAM(S): 80 TABLET, CHEWABLE ORAL at 06:07

## 2022-10-06 RX ADMIN — SODIUM CHLORIDE 3 MILLILITER(S): 9 INJECTION INTRAMUSCULAR; INTRAVENOUS; SUBCUTANEOUS at 16:32

## 2022-10-06 RX ADMIN — Medication 650 MILLIGRAM(S): at 20:17

## 2022-10-06 RX ADMIN — LEVETIRACETAM 500 MILLIGRAM(S): 250 TABLET, FILM COATED ORAL at 10:25

## 2022-10-06 RX ADMIN — SIMETHICONE 80 MILLIGRAM(S): 80 TABLET, CHEWABLE ORAL at 00:33

## 2022-10-06 RX ADMIN — Medication 25 MILLIGRAM(S): at 10:54

## 2022-10-06 RX ADMIN — Medication 650 MILLIGRAM(S): at 20:16

## 2022-10-06 RX ADMIN — Medication 1 TABLET(S): at 05:53

## 2022-10-06 RX ADMIN — Medication 60 MILLIGRAM(S): at 18:43

## 2022-10-06 RX ADMIN — Medication 1 TABLET(S): at 10:28

## 2022-10-06 RX ADMIN — SIMETHICONE 80 MILLIGRAM(S): 80 TABLET, CHEWABLE ORAL at 23:25

## 2022-10-06 RX ADMIN — ENOXAPARIN SODIUM 40 MILLIGRAM(S): 100 INJECTION SUBCUTANEOUS at 09:25

## 2022-10-06 NOTE — PROGRESS NOTE ADULT - ASSESSMENT
A/P 26yo P1 s/p primary LTCS for arrest of dilation, POD#3, , pre-eclampsia w/ severe features, s/p multiple IV antihypertensive pushes, on keppra for siezure prophylaxis, with post op anuria now resolved and intrinsic EVELIO stable, currently normotensive and asymtomatic,    - Per MFM, restart magnesium for seizure ppx given improved Cr  -continue magnesium until 10/6 @1600  - IVF  - monitor I&Os  - neuro check q2hr   - f/u PELs @0600  - simethicone/senna for gas pain and constipation.       Dr. Campbell and Dr. Gonzalez aware

## 2022-10-06 NOTE — PROGRESS NOTE ADULT - SUBJECTIVE AND OBJECTIVE BOX
Subjective:   Pt evaluated at bedside for magnesium check. She denies HA, changes in vision, or right upper quadrant pain. Denies CP, SOB, N/V, LE swelling, No calf tenderness.   Pain controlled. Pt reports she has not yet been able to pass a BM, but has lot of flatus.     Objective:   Vital Signs Last 24 Hrs  T(C): 36.7 (06 Oct 2022 00:06), Max: 36.8 (05 Oct 2022 11:15)  T(F): 98.06 (06 Oct 2022 00:06), Max: 98.24 (05 Oct 2022 11:15)  HR: 92 (06 Oct 2022 03:29) (30 - 138)  BP: 139/78 (06 Oct 2022 03:23) (95/62 - 190/100)  BP(mean): --  RR: 16 (06 Oct 2022 01:02) (16 - 18)  SpO2: 96% (06 Oct 2022 03:29) (85% - 100%)    Gen: NAD, AAOx3  CV: RRR, no M/R/G  Pulm: CTAB, no R/R/W  Abd: soft, gravid, nontender, no rebound or guarding, no epigastric tenderness, liver nonpalpable +BS.   : voiding  Ext: +2 edema molly, SCDs in place  Neuro: 2+ BL upper extremity reflexes    UO (min 59cc/hr): (0457-3304) 2400cc (0000-56028) 800cc    Medications:  MEDICATIONS  (STANDING):  diphtheria/tetanus/pertussis (acellular) Vaccine (ADAcel) 0.5 milliLiter(s) IntraMuscular once  lactated ringers. 1000 milliLiter(s) (25 mL/Hr) IV Continuous <Continuous>  magnesium sulfate Infusion 2 Gm/Hr (50 mL/Hr) IV Continuous <Continuous>  senna 2 Tablet(s) Oral at bedtime  simethicone 80 milliGRAM(s) Chew every 6 hours  sodium chloride 0.9% lock flush 3 milliLiter(s) IV Push every 8 hours    MEDICATIONS  (PRN):  acetaminophen     Tablet .. 650 milliGRAM(s) Oral every 6 hours PRN Moderate Pain (4 - 6), Severe Pain (7 - 10)  calcium carbonate    500 mG (Tums) Chewable 1 Tablet(s) Chew every 4 hours PRN Heartburn  citric acid/sodium citrate Solution 30 milliLiter(s) Oral every 6 hours PRN heartburn  diphenhydrAMINE 25 milliGRAM(s) Oral every 6 hours PRN Pruritus  lanolin Ointment 1 Application(s) Topical every 6 hours PRN Sore Nipples  magnesium hydroxide Suspension 30 milliLiter(s) Oral two times a day PRN Constipation  oxyCODONE    IR 5 milliGRAM(s) Oral once PRN Moderate to Severe Pain (4-10)  oxyCODONE    IR 5 milliGRAM(s) Oral every 3 hours PRN Moderate to Severe Pain (4-10)      No Known Allergies      Labs:                        8.9    11.77 )-----------( 171      ( 05 Oct 2022 18:18 )             26.5     10-05    141  |  101  |  6<L>  ----------------------------<  68<L>  3.8   |  29  |  0.7    Ca    7.8<L>      05 Oct 2022 18:18  Mg     6.3     10-03    TPro  5.1<L>  /  Alb  2.6<L>  /  TBili  0.3  /  DBili  x   /  AST  17  /  ALT  7   /  AlkPhos  228<H>  10-05    Uric Acid, Serum: 6.8 mg/dL (10-05 @ 18:18)  Uric Acid, Serum: 6.1 mg/dL (10-05 @ 07:20)    Urine output:

## 2022-10-06 NOTE — PROGRESS NOTE ADULT - ASSESSMENT
A/P: 26yo P1 s/p primary LTCS for arrest of dilation, POD#2, , pre-eclampsia w/ severe features, s/p multiple IV antihypertensive pushes, s/p keppra now on magnesium for siezure prophylaxis, with post op anuria and intrinsic EVELIO now resolved,   - continue routine post op care  - continue magnesium until 10/6 @1800  - strict i's and o's  - s/p 20mg of IV lasix x2   - f/u blood pressures currently elevated in the non-severe range on procardia 60 XL  - f/u AM labs + mag level  - encourage ambulation, PO hydration  - monitor vitals, bleeding   - simethicone/senna  - DVT prophylaxis: lovenox + scds  - pain mgmt prn       OB attending to be made aware   A/P: 26yo P1 s/p primary LTCS for arrest of dilation, POD#3, , pre-eclampsia w/ severe features, s/p multiple IV antihypertensive pushes, s/p keppra now on magnesium for siezure prophylaxis, with post op anuria and intrinsic EVELIO now resolved, otherwise recovering well  - continue routine post op care  - continue magnesium until 10/6 @1800  - strict i's and o's  - s/p 20mg of IV lasix x2   - f/u blood pressures currently elevated in the non-severe range on procardia 60 XL  - f/u AM labs + mag level  - encourage ambulation, PO hydration  - monitor vitals, bleeding   - simethicone/senna  - DVT prophylaxis: scds will reconsider starting lovenox today  - pain mgmt prn       OB attending to be made aware   Impression/Plan:  A/P: 28yo P1 s/p primary LTCS for arrest of dilation, POD#3, , pre-eclampsia w/ severe features, s/p multiple IV antihypertensive pushes, magnesium restarted for PP hypertensive crises, now switched to Keppra for 12 more hours.  Intrinsic EVELIO resulted in decreased UO, then -0- output, complicated by nuñez malfunction.  Despite persistance of anasarca bilat to mid-thighs, pt is diuresing markedly with >300cc/h over the last 8h.   1. Pt may go to floor this afternoon, if no further bp in severe range.   2. DAILY WEIGHTS (169# TODAY)  3. Strict i's and o's.  Please offer pt nuñez catheter at bedtime if brisk diuresis continues so that she can get some rest.   4. Begin HCTZ 25mg/d for bp control and to enhance diuresis         f/u blood pressures currently elevated in the non-severe range on procardia 60 XL  5. Encourage ambulation, PO hydration  6. DVT prophylaxis: scds.  Pt has been on Lovenox since yesterday.  7.  pain mgmt prn     MD Yadira, FACOG with SHAKIRA Armando MD, PGY-3

## 2022-10-06 NOTE — CHART NOTE - NSCHARTNOTEFT_GEN_A_CORE
PGY 3 note    Patient seen and evaluated on labor and delivery for blood pressure monitoring s/p magnesium. Blood pressures intermittently elevated in the nonsevere range. Patient is ambulating, tolerating a regular diet, voiding, passing flatus and breast feeding.     T(C): 36.7 (10-06-22 @ 05:02), Max: 36.7 (10-06-22 @ 00:06)  HR: 123 (10-06-22 @ 14:23) (75 - 142)  BP: 135/82 (10-06-22 @ 14:23) (115/58 - 190/100)  RR: 16 (10-06-22 @ 06:09) (16 - 18)  SpO2: 97% (10-06-22 @ 10:01) (73% - 100%)  BMI (kg/m2): 47.5 (10-03-22 @ 09:46)      Patient stable for transfer to

## 2022-10-06 NOTE — PROGRESS NOTE ADULT - SUBJECTIVE AND OBJECTIVE BOX
Subjective:   Pt evaluated at bedside for magnesium check. She denies HA, changes in vision, or right upper quadrant pain. Denies CP, SOB, N/V, LE swelling, No calf tenderness.   Pain controlled.    Objective:   T(F): 97.7 (10-05 @ 19:02), Max: 98.24 (10-05 @ 11:15)  HR: 106 (10-05 @ 21:56)  BP: 132/73 (10-05 @ 22:01) (124/57 - 190/100)  RR: 16 (10-05 @ 21:01)  SpO2: 94% (10-05 @ 22:01)  Vital Signs Last 24 Hrs  BP: 132/73 (05 Oct 2022 22:01) (91/55 - 190/100)    10-05 @ 07:01  -  10-05 @ 22:03  --------------------------------------------------------  IN: 650 mL / OUT: 2030 mL / NET: -1380 mL      Gen: NAD, AAOx3  CV: RRR, no M/R/G  Pulm: CTAB, no R/R/W  Abd: soft, gravid, nontender, no rebound or guarding, no epigastric tenderness, liver nonpalpable +BS.   : voiding  Ext: +2 edema molly, SCDs in place  Neuro: 2+ BL upper extremity reflexes    UO (min 59cc/hr): (7722-9532) 2400cc    Medications:  MEDICATIONS  (STANDING):  diphtheria/tetanus/pertussis (acellular) Vaccine (ADAcel) 0.5 milliLiter(s) IntraMuscular once  lactated ringers. 1000 milliLiter(s) (25 mL/Hr) IV Continuous <Continuous>  magnesium sulfate Infusion 2 Gm/Hr (50 mL/Hr) IV Continuous <Continuous>  senna 2 Tablet(s) Oral at bedtime  simethicone 80 milliGRAM(s) Chew every 6 hours  sodium chloride 0.9% lock flush 3 milliLiter(s) IV Push every 8 hours    MEDICATIONS  (PRN):  acetaminophen     Tablet .. 650 milliGRAM(s) Oral every 6 hours PRN Moderate Pain (4 - 6), Severe Pain (7 - 10)  calcium carbonate    500 mG (Tums) Chewable 1 Tablet(s) Chew every 4 hours PRN Heartburn  citric acid/sodium citrate Solution 30 milliLiter(s) Oral every 6 hours PRN heartburn  diphenhydrAMINE 25 milliGRAM(s) Oral every 6 hours PRN Pruritus  lanolin Ointment 1 Application(s) Topical every 6 hours PRN Sore Nipples  magnesium hydroxide Suspension 30 milliLiter(s) Oral two times a day PRN Constipation  oxyCODONE    IR 5 milliGRAM(s) Oral once PRN Moderate to Severe Pain (4-10)  oxyCODONE    IR 5 milliGRAM(s) Oral every 3 hours PRN Moderate to Severe Pain (4-10)      No Known Allergies      Labs:                        8.9    11.77 )-----------( 171      ( 05 Oct 2022 18:18 )             26.5     10-05    141  |  101  |  6<L>  ----------------------------<  68<L>  3.8   |  29  |  0.7    Ca    7.8<L>      05 Oct 2022 18:18  Mg     6.3     10-03    TPro  5.1<L>  /  Alb  2.6<L>  /  TBili  0.3  /  DBili  x   /  AST  17  /  ALT  7   /  AlkPhos  228<H>  10-05    Uric Acid, Serum: 6.8 mg/dL (10-05 @ 18:18)  Uric Acid, Serum: 6.1 mg/dL (10-05 @ 07:20)    Urine output:

## 2022-10-06 NOTE — PROGRESS NOTE ADULT - SUBJECTIVE AND OBJECTIVE BOX
JANET HUTCHISON  27y  Female    PGY3 Note:    Pt is POD#3. Pt is recovering well, no acute complaints. Pt denies heavy vaginal bleeding, pain well controlled on PO medication. Patient is ambulating, tolerating a regular diet, voiding, passing flatus. Breast feeding.  Denies headache, chest pain, SOB, fever, chills, nausea, vomiting, extermity pain or swelling.       PAST MEDICAL & SURGICAL HISTORY:  Obesity  S/P dilation and curettage        Physical Exam  Vital Signs Last 24 Hrs  T(C): 36.7 (06 Oct 2022 05:02), Max: 36.8 (05 Oct 2022 11:15)  T(F): 98.06 (06 Oct 2022 05:02), Max: 98.24 (05 Oct 2022 11:15)  HR: 93 (06 Oct 2022 05:49) (30 - 138)  BP: 149/76 (06 Oct 2022 05:18) (95/62 - 190/100)  RR: 16 (06 Oct 2022 04:04) (16 - 18)  SpO2: 95% (06 Oct 2022 05:49) (80% - 100%)      Gen: AAOx3, NAD  Heart: RRR, no M/R/G  Lungs: CTAB  Fundus: firm, below umbilicus, nontender   Wound: Pfannenstiel incision, steris in place c/d/i   Abd: Soft, appropriately tender near incision site, mildly distended, BS+  Lochia: minimal  Ext: No calf tenderness, no swelling  Neuro: biceps reflexes 2+ bilaterally   Labs:                        9.8    11.08 )-----------( 162      ( 06 Oct 2022 01:00 )             29.0                         8.9    11.77 )-----------( 171      ( 05 Oct 2022 18:18 )             26.5        MEDICATIONS  (STANDING):  diphtheria/tetanus/pertussis (acellular) Vaccine (ADAcel) 0.5 milliLiter(s) IntraMuscular once  lactated ringers. 1000 milliLiter(s) (25 mL/Hr) IV Continuous <Continuous>  magnesium sulfate Infusion 2 Gm/Hr (50 mL/Hr) IV Continuous <Continuous>  NIFEdipine XL 60 milliGRAM(s) Oral every 24 hours  senna 2 Tablet(s) Oral at bedtime  simethicone 80 milliGRAM(s) Chew every 6 hours  sodium chloride 0.9% lock flush 3 milliLiter(s) IV Push every 8 hours    MEDICATIONS  (PRN):  acetaminophen     Tablet .. 650 milliGRAM(s) Oral every 6 hours PRN Moderate Pain (4 - 6), Severe Pain (7 - 10)  calcium carbonate    500 mG (Tums) Chewable 1 Tablet(s) Chew every 4 hours PRN Heartburn  citric acid/sodium citrate Solution 30 milliLiter(s) Oral every 6 hours PRN heartburn  diphenhydrAMINE 25 milliGRAM(s) Oral every 6 hours PRN Pruritus  lanolin Ointment 1 Application(s) Topical every 6 hours PRN Sore Nipples  magnesium hydroxide Suspension 30 milliLiter(s) Oral two times a day PRN Constipation  oxyCODONE    IR 5 milliGRAM(s) Oral once PRN Moderate to Severe Pain (4-10)  oxyCODONE    IR 5 milliGRAM(s) Oral every 3 hours PRN Moderate to Severe Pain (4-10)       JANET HUTCHISON  27y  Female    PGY3 Note:    Pt is POD#3. Pt is recovering well, no acute complaints. Pt denies heavy vaginal bleeding, pain well controlled on PO medication. Patient is ambulating, tolerating a regular diet, voiding, passing flatus. Breast feeding.  Denies headache, chest pain, SOB, fever, chills, nausea, vomiting, extermity pain or swelling.       PAST MEDICAL & SURGICAL HISTORY:  Obesity  S/P dilation and curettage        Physical Exam  Vital Signs Last 24 Hrs  T(C): 36.7 (06 Oct 2022 05:02), Max: 36.8 (05 Oct 2022 11:15)  T(F): 98.06 (06 Oct 2022 05:02), Max: 98.24 (05 Oct 2022 11:15)  HR: 93 (06 Oct 2022 05:49) (30 - 138)  BP: 149/76 (06 Oct 2022 05:18) (95/62 - 190/100)  RR: 16 (06 Oct 2022 04:04) (16 - 18)  SpO2: 95% (06 Oct 2022 05:49) (80% - 100%)      Gen: AAOx3, NAD  Heart: RRR, no M/R/G  Lungs: CTAB  Fundus: firm, below umbilicus, nontender   Wound: Pfannenstiel incision, steris in place c/d/i   Abd: Soft, appropriately tender near incision site, mildly distended, BS+  Lochia: minimal  Ext: No calf tenderness, no swelling  Neuro: biceps reflexes 3+ bilaterally     UO(min 58cc/hr): (8895-3464) 2480cc    Labs:                        9.8    11.08 )-----------( 162      ( 06 Oct 2022 01:00 )             29.0                         8.9    11.77 )-----------( 171      ( 05 Oct 2022 18:18 )             26.5        MEDICATIONS  (STANDING):  diphtheria/tetanus/pertussis (acellular) Vaccine (ADAcel) 0.5 milliLiter(s) IntraMuscular once  lactated ringers. 1000 milliLiter(s) (25 mL/Hr) IV Continuous <Continuous>  magnesium sulfate Infusion 2 Gm/Hr (50 mL/Hr) IV Continuous <Continuous>  NIFEdipine XL 60 milliGRAM(s) Oral every 24 hours  senna 2 Tablet(s) Oral at bedtime  simethicone 80 milliGRAM(s) Chew every 6 hours  sodium chloride 0.9% lock flush 3 milliLiter(s) IV Push every 8 hours    MEDICATIONS  (PRN):  acetaminophen     Tablet .. 650 milliGRAM(s) Oral every 6 hours PRN Moderate Pain (4 - 6), Severe Pain (7 - 10)  calcium carbonate    500 mG (Tums) Chewable 1 Tablet(s) Chew every 4 hours PRN Heartburn  citric acid/sodium citrate Solution 30 milliLiter(s) Oral every 6 hours PRN heartburn  diphenhydrAMINE 25 milliGRAM(s) Oral every 6 hours PRN Pruritus  lanolin Ointment 1 Application(s) Topical every 6 hours PRN Sore Nipples  magnesium hydroxide Suspension 30 milliLiter(s) Oral two times a day PRN Constipation  oxyCODONE    IR 5 milliGRAM(s) Oral once PRN Moderate to Severe Pain (4-10)  oxyCODONE    IR 5 milliGRAM(s) Oral every 3 hours PRN Moderate to Severe Pain (4-10)       10/6/22  MFM Att'g & PGY-3 f/u PP Consult Note:  28yo  s/p IOL for preeclampsia, severe at 38wEGA and POD#3 primary LTCSx for arrest of dilation.  Pt has required multiple IV antihypertensive pushes, s/p keppra now on magnesium for seizure prophylaxis.  Post-op anuria due intrinsic EVELIO (now resolved) and nuñez catheter malfunction that had resulted in 1000cc bladder overdistention.  Pt c/o high UO (300-500cc/h) and single episode of incontinence at 0500. She denies heavy vaginal bleeding, pain well controlled on PO medication. Ambulating, tolerating a regular diet, voiding, passing flatus. Breast feeding.  Denies headache, chest pain, SOB, fever, chills, nausea, vomiting, extermity pain or swelling.   PNC: c/b 24# weight gain from  to  (236 - 260) in last 4w of pregnancy.     PMHx: Obesity: BMI 49 on , last prenatal visit.    Surg:   Dilation and curettage             10/4/22: 1oCSx as above.   Meds: Nifedipine 60mg po daily            HCTZ 25mg po daily (started 10/6)            Furosemide x1            Enoxaparin 40mg SQ starting 10/5 at 12N            Piperacillin/tazobactam            Magnesium for PP preecl, switched to Keppra 500mg bid x2 doses today only.            Tdap completed  Allergies: NKDA  OBHx:  P1  Gyn: Normal menses.   FHx: Unremarkable.  SocHx: Lives with her mother.  Education history unclear. Appears very embarrassed about incontinence episode. Denies subst use x3.   Vaccinations: Not reviewed.   Family Planning: Not reviewed.   ROS: As above.               Px: Seated comfortably in HR bay #2.   VS  TMax: 36.8 HR: 93; BP: 149/76 (95/62 - 190/100).  Wt 269 (9#> last AP weight) following initial diuresis.   RR: 16  SpO2: 95% (80% - 100%)  Gen: AAOx3, however some limited understanding of events of last 3 days. NAD.  Heart: RRR, no M/R/G  Lungs: CTAB  Fundus: firm, below umbilicus, nontender. Skin is somewhat indurated below pannus.   Wound: Pfannenstiel incision, steris in place c/d/i   Abd: Soft, appropriately tender near incision site, mildly distended, BS+  Lochia: minimal  Ext: No calf tenderness but 4+ anasarca to thighs.   Neuro: biceps reflexes 3+ bilaterally     UO(min 58cc/hr): (3300-0064) 2480cc (prior to today's weight)    Labs:  10/6            9.8          11.08 )-----------( 162      ( 06 Oct 2022 01:00 )                    29.0   /3.5         5/0.6    ast/alt 16/6  10/5           8.9    11.77 )-----------( 171      ( 05 Oct 2022 18:18 )        10/3/22:       11.3              24k )-----------( 160k                     33.6%    /4.2      BUN/Cr 4/1.3  ast/alt 29/9.

## 2022-10-06 NOTE — CHART NOTE - NSCHARTNOTESELECT_GEN_ALL_CORE
Event Note
Transfer Note
Event Note

## 2022-10-07 ENCOUNTER — TRANSCRIPTION ENCOUNTER (OUTPATIENT)
Age: 27
End: 2022-10-07

## 2022-10-07 LAB
ALBUMIN SERPL ELPH-MCNC: 2.7 G/DL — LOW (ref 3.5–5.2)
ALP SERPL-CCNC: 204 U/L — HIGH (ref 30–115)
ALT FLD-CCNC: 7 U/L — SIGNIFICANT CHANGE UP (ref 0–41)
ANION GAP SERPL CALC-SCNC: 11 MMOL/L — SIGNIFICANT CHANGE UP (ref 7–14)
AST SERPL-CCNC: 15 U/L — SIGNIFICANT CHANGE UP (ref 0–41)
BILIRUB SERPL-MCNC: 0.3 MG/DL — SIGNIFICANT CHANGE UP (ref 0.2–1.2)
BUN SERPL-MCNC: 5 MG/DL — LOW (ref 10–20)
CALCIUM SERPL-MCNC: 8.4 MG/DL — SIGNIFICANT CHANGE UP (ref 8.4–10.5)
CHLORIDE SERPL-SCNC: 100 MMOL/L — SIGNIFICANT CHANGE UP (ref 98–110)
CO2 SERPL-SCNC: 27 MMOL/L — SIGNIFICANT CHANGE UP (ref 17–32)
CREAT SERPL-MCNC: 0.5 MG/DL — LOW (ref 0.7–1.5)
EGFR: 131 ML/MIN/1.73M2 — SIGNIFICANT CHANGE UP
FIBRINOGEN AG PPP IA-MCNC: 432 MG/DL — SIGNIFICANT CHANGE UP (ref 206–478)
FIBRINOGEN AG PPP IA-MCNC: 441 MG/DL — SIGNIFICANT CHANGE UP (ref 206–478)
GLUCOSE SERPL-MCNC: 94 MG/DL — SIGNIFICANT CHANGE UP (ref 70–99)
LDH SERPL L TO P-CCNC: 259 — HIGH (ref 50–242)
POTASSIUM SERPL-MCNC: 3.3 MMOL/L — LOW (ref 3.5–5)
POTASSIUM SERPL-SCNC: 3.3 MMOL/L — LOW (ref 3.5–5)
PROT SERPL-MCNC: 5.4 G/DL — LOW (ref 6–8)
SODIUM SERPL-SCNC: 138 MMOL/L — SIGNIFICANT CHANGE UP (ref 135–146)
URATE SERPL-MCNC: 6.6 MG/DL — SIGNIFICANT CHANGE UP (ref 2.5–7)

## 2022-10-07 PROCEDURE — 99232 SBSQ HOSP IP/OBS MODERATE 35: CPT

## 2022-10-07 RX ORDER — ACETAMINOPHEN 500 MG
2 TABLET ORAL
Qty: 0 | Refills: 0 | DISCHARGE
Start: 2022-10-07

## 2022-10-07 RX ORDER — OXYCODONE HYDROCHLORIDE 5 MG/1
1 TABLET ORAL
Qty: 0 | Refills: 0 | DISCHARGE
Start: 2022-10-07

## 2022-10-07 RX ORDER — SIMETHICONE 80 MG/1
1 TABLET, CHEWABLE ORAL
Qty: 18 | Refills: 0
Start: 2022-10-07 | End: 2022-10-09

## 2022-10-07 RX ORDER — NIFEDIPINE 30 MG
1 TABLET, EXTENDED RELEASE 24 HR ORAL
Qty: 0 | Refills: 0 | DISCHARGE
Start: 2022-10-07

## 2022-10-07 RX ORDER — OXYCODONE HYDROCHLORIDE 5 MG/1
1 TABLET ORAL
Qty: 12 | Refills: 0
Start: 2022-10-07 | End: 2022-10-09

## 2022-10-07 RX ORDER — DOCUSATE SODIUM 100 MG
1 CAPSULE ORAL
Qty: 7 | Refills: 0
Start: 2022-10-07 | End: 2022-10-13

## 2022-10-07 RX ORDER — SIMETHICONE 80 MG/1
1 TABLET, CHEWABLE ORAL
Qty: 0 | Refills: 0 | DISCHARGE
Start: 2022-10-07

## 2022-10-07 RX ORDER — SENNA PLUS 8.6 MG/1
2 TABLET ORAL
Qty: 0 | Refills: 0 | DISCHARGE
Start: 2022-10-07

## 2022-10-07 RX ORDER — NIFEDIPINE 30 MG
1 TABLET, EXTENDED RELEASE 24 HR ORAL
Qty: 30 | Refills: 1
Start: 2022-10-07 | End: 2022-12-05

## 2022-10-07 RX ORDER — ACETAMINOPHEN 500 MG
1 TABLET ORAL
Qty: 28 | Refills: 0
Start: 2022-10-07 | End: 2022-10-13

## 2022-10-07 RX ADMIN — LEVETIRACETAM 500 MILLIGRAM(S): 250 TABLET, FILM COATED ORAL at 06:07

## 2022-10-07 RX ADMIN — SIMETHICONE 80 MILLIGRAM(S): 80 TABLET, CHEWABLE ORAL at 06:07

## 2022-10-07 RX ADMIN — SODIUM CHLORIDE 3 MILLILITER(S): 9 INJECTION INTRAMUSCULAR; INTRAVENOUS; SUBCUTANEOUS at 06:01

## 2022-10-07 RX ADMIN — SODIUM CHLORIDE 3 MILLILITER(S): 9 INJECTION INTRAMUSCULAR; INTRAVENOUS; SUBCUTANEOUS at 14:47

## 2022-10-07 RX ADMIN — ENOXAPARIN SODIUM 40 MILLIGRAM(S): 100 INJECTION SUBCUTANEOUS at 11:17

## 2022-10-07 RX ADMIN — SODIUM CHLORIDE 3 MILLILITER(S): 9 INJECTION INTRAMUSCULAR; INTRAVENOUS; SUBCUTANEOUS at 21:34

## 2022-10-07 RX ADMIN — OXYCODONE HYDROCHLORIDE 5 MILLIGRAM(S): 5 TABLET ORAL at 04:10

## 2022-10-07 RX ADMIN — Medication 25 MILLIGRAM(S): at 06:07

## 2022-10-07 RX ADMIN — SIMETHICONE 80 MILLIGRAM(S): 80 TABLET, CHEWABLE ORAL at 17:20

## 2022-10-07 RX ADMIN — Medication 60 MILLIGRAM(S): at 17:20

## 2022-10-07 RX ADMIN — SIMETHICONE 80 MILLIGRAM(S): 80 TABLET, CHEWABLE ORAL at 11:23

## 2022-10-07 RX ADMIN — Medication 1 TABLET(S): at 04:09

## 2022-10-07 RX ADMIN — Medication 650 MILLIGRAM(S): at 12:00

## 2022-10-07 RX ADMIN — LEVETIRACETAM 500 MILLIGRAM(S): 250 TABLET, FILM COATED ORAL at 17:20

## 2022-10-07 RX ADMIN — Medication 650 MILLIGRAM(S): at 11:14

## 2022-10-07 NOTE — PROGRESS NOTE ADULT - ASSESSMENT
A/P: 26yo P1 s/p primary LTCS for arrest of dilation, POD#4, , pre-eclampsia w/ severe features, s/p multiple IV antihypertensive pushes, magnesium restarted for PP hypertensive crises, now switched to Keppra for 12 more hours.  Intrinsic EVELIO resulted in decreased UO, then -0- output, complicated by nuñez malfunction.  1. continue routine post partum care  2. DAILY WEIGHTS   3. Strict i's and o's  4. continue HCTZ 25mg/d and procardia 60XL for bp control and to enhance diuresis         f/u blood pressures currently elevated in the non-severe range  5. Encourage ambulation, PO hydration  6. DVT prophylaxis: scds + Lovenox   7.  pain mgmt pr    OB attending to be made aware A/P: 26yo P1 s/p primary LTCS for arrest of dilation, POD#4, , pre-eclampsia w/ severe features, s/p multiple IV antihypertensive pushes, magnesium restarted for PP hypertensive crises, now switched to Keppra for 12 more hours.  Intrinsic EVELIO resulted in decreased UO, then -0- output, complicated by nuñez malfunction.  1. continue routine post partum care  2. DAILY WEIGHTS   3. Strict i's and o's  4. continue HCTZ 25mg/d and procardia 60XL for bp control and to enhance diuresis         f/u blood pressures currently elevated in the non-severe range  5. Encourage ambulation, PO hydration  6. DVT prophylaxis   7.  pain mgmt pr    OB attending to be made aware

## 2022-10-07 NOTE — DISCHARGE NOTE OB - MEDICATION SUMMARY - MEDICATIONS TO TAKE
I will START or STAY ON the medications listed below when I get home from the hospital:    acetaminophen 325 mg oral tablet  -- 2 tab(s) by mouth every 6 hours, As needed, Moderate Pain (4 - 6), Severe Pain (7 - 10)  -- Indication: For pain    oxyCODONE 5 mg oral tablet  -- 1 tab(s) by mouth once, As needed, Moderate to Severe Pain (4-10)  -- Indication: For pain    NIFEdipine 60 mg oral tablet, extended release  -- 1 tab(s) by mouth every 24 hours  -- Indication: For blood pressure    hydroCHLOROthiazide 25 mg oral tablet  -- 1 tab(s) by mouth once a day  -- Indication: For blood pressure    senna leaf extract oral tablet  -- 2 tab(s) by mouth once a day (at bedtime)  -- Indication: For constipation    simethicone 80 mg oral tablet, chewable  -- 1 tab(s) by mouth every 6 hours  -- Indication: For gas   I will START or STAY ON the medications listed below when I get home from the hospital:    acetaminophen 325 mg oral tablet  -- 2 tab(s) by mouth every 6 hours, As needed, Moderate Pain (4 - 6), Severe Pain (7 - 10)  -- Indication: For Pain    oxyCODONE 5 mg oral tablet  -- 1 tab(s) by mouth once, As needed, Moderate to Severe Pain (4-10)  -- Indication: For Pain    NIFEdipine 60 mg oral tablet, extended release  -- 1 tab(s) by mouth every 24 hours  -- Indication: For Elevated BP    hydroCHLOROthiazide 25 mg oral tablet  -- 1 tab(s) by mouth once a day  -- Indication: For Elevated BPs    senna leaf extract oral tablet  -- 2 tab(s) by mouth once a day (at bedtime)  -- Indication: For Constipation    simethicone 80 mg oral tablet, chewable  -- 1 tab(s) by mouth every 6 hours  -- Indication: For gas

## 2022-10-07 NOTE — DISCHARGE NOTE OB - CARE PROVIDER_API CALL
Junior Lee)  Obstetrics and Gynecology  [address] [place] [address]  Phone: [phone]  Fax: [phone]  Follow Up Time: 1 week

## 2022-10-07 NOTE — DISCHARGE NOTE OB - PATIENT PORTAL LINK FT
You can access the FollowMyHealth Patient Portal offered by [facility] by registering at the following website: [email or web address]. By joining [employer]’s FollowMyHealth portal, you will also be able to view your health information using other applications (apps) compatible with our system.

## 2022-10-07 NOTE — DISCHARGE NOTE OB - HOSPITAL COURSE
DATE OF DISCHARGE: 10-07-22 @ 06:35    HISTORY OF PRESENT ILLNESS/HOSPITAL COURSE: HPI:  28 y/o  at 37w6d, EDD1/, dated by LMP c/w first trimester sonogram, presents for back pain and vaginal spotting. On presentation to L&D, pt had back to back severe range BPs, to 194/116. Pt denies HA, vision change, CP, SOB, RUQ pain. Mother of patient reports pt has had 3-4 days of worsening bilateral lower leg swelling. Denies any h/o HTN or elevated BPs in this pregnancy. Reports +FM, denies LOF. Pregnancy complicated by constipation, morbid obesity, . Suspected fetal macrosomia, AC >97%il on 34w sonogram. GBS positive. (03 Oct 2022 05:47)    PAST MEDICAL & SURGICAL HISTORY:  Obesity      S/P dilation and curettage          PROCEDURES PERFORMED: Term  section  COMPLICATIONS:  pre-eclampsia w/ severe features, anuria 2/2 nuñez disfunction   POST PARTUM COURSE: uncomplicated, discharged home on PPD2  FINAL DIAGNOSIS:  Status post  section    DISCHARGE CBC:                       9.1    7.92  )-----------( 205      ( 06 Oct 2022 22:46 )             27.0     DISCHARGE INSTRUCTIONS:  If you have severe abdominal pain, heavy vaginal bleeding, shortness of breath or chest pain please call your doctor or come to the emergency room.   DIET:  Advance as tolerated.  ACTIVITY:  Advance as tolerated.  Pelvic rest for 6 weeks.  Nothing to be inserted into the vagina for 6 weeks, i.e. no tampons, douching, sexual relations, or tub baths.   FOLLOW UP: Make an appointment to see your doctor as instructed   PRESCRIPTIONS: Prescriptions:  acetaminophen 325 mg oral tablet: 1 tab(s) orally every 6 hours  (07 Oct 2022 06:33)  Colace 100 mg oral capsule: 1 cap(s) orally once a day (at bedtime)  (07 Oct 2022 06:33)  hydroCHLOROthiazide 25 mg oral tablet: 1 tab(s) orally once a day  (07 Oct 2022 06:33)  oxyCODONE 5 mg oral tablet: 1 tab(s) orally every 6 hours MDD:4 (07 Oct 2022 06:33)  Procardia XL 30 mg oral tablet, extended release: 1 tab(s) orally once a day (at bedtime)  (07 Oct 2022 06:33)  simethicone 80 mg oral tablet, chewable: 1 tab(s) chewed every 4 hours  (07 Oct 2022 06:33)

## 2022-10-07 NOTE — PROGRESS NOTE ADULT - SUBJECTIVE AND OBJECTIVE BOX
JANET HUTCHISON  27y  Female    PGY3 Note:    Pt is POD#4. Pt is recovering well, no acute complaints. Pt denies heavy vaginal bleeding, pain well controlled on PO medication. Patient is ambulating, tolerating a regular diet, voiding, passing flatus. Breast feeding.  Denies headache, chest pain, SOB, fever, chills, nausea, vomiting, extermity pain or swelling.     PAST MEDICAL & SURGICAL HISTORY:  Obesity  S/P dilation and curettage      Physical Exam  Vital Signs Last 24 Hrs  T(C): 36.3 (07 Oct 2022 03:54), Max: 36.9 (06 Oct 2022 15:50)  T(F): 97.3 (07 Oct 2022 03:54), Max: 98.4 (06 Oct 2022 15:50)  HR: 99 (07 Oct 2022 03:54) (90 - 131)  BP: 144/82 (07 Oct 2022 03:54) (119/82 - 169/86)  RR: 18 (07 Oct 2022 03:54) (18 - 18)  SpO2: 97% (06 Oct 2022 10:01) (85% - 100%)      Gen: AAOx3, NAD  Heart: RRR, no M/R/G  Lungs: CTAB  Fundus: firm, below umbilicus, nontender   Wound: Pfannenstiel incision, steris in place c/d/i   Abd: Soft, appropriately tender near incision site, mildly distended, BS+  Lochia: minimal  Ext: No calf tenderness, 2+ swelling    Labs:                        9.1    7.92  )-----------( 205      ( 06 Oct 2022 22:46 )             27.0                         9.9    10.12 )-----------( 192      ( 06 Oct 2022 05:48 )             29.0      MEDICATIONS  (STANDING):  diphtheria/tetanus/pertussis (acellular) Vaccine (ADAcel) 0.5 milliLiter(s) IntraMuscular once  enoxaparin Injectable 40 milliGRAM(s) SubCutaneous every 24 hours  hydrochlorothiazide 25 milliGRAM(s) Oral daily  lactated ringers. 1000 milliLiter(s) (25 mL/Hr) IV Continuous <Continuous>  levETIRAcetam 500 milliGRAM(s) Oral two times a day  NIFEdipine XL 60 milliGRAM(s) Oral every 24 hours  senna 2 Tablet(s) Oral at bedtime  simethicone 80 milliGRAM(s) Chew every 6 hours  sodium chloride 0.9% lock flush 3 milliLiter(s) IV Push every 8 hours    MEDICATIONS  (PRN):  acetaminophen     Tablet .. 650 milliGRAM(s) Oral every 6 hours PRN Moderate Pain (4 - 6), Severe Pain (7 - 10)  calcium carbonate    500 mG (Tums) Chewable 1 Tablet(s) Chew every 4 hours PRN Heartburn  citric acid/sodium citrate Solution 30 milliLiter(s) Oral every 6 hours PRN heartburn  diphenhydrAMINE 25 milliGRAM(s) Oral every 6 hours PRN Pruritus  lanolin Ointment 1 Application(s) Topical every 6 hours PRN Sore Nipples  magnesium hydroxide Suspension 30 milliLiter(s) Oral two times a day PRN Constipation  oxyCODONE    IR 5 milliGRAM(s) Oral once PRN Moderate to Severe Pain (4-10)  oxyCODONE    IR 5 milliGRAM(s) Oral every 3 hours PRN Moderate to Severe Pain (4-10)

## 2022-10-07 NOTE — DISCHARGE NOTE OB - NS MD DC FALL RISK RISK
For information on Fall & Injury Prevention, visit: [email or web address] OR  [email or web address] OR  https://www.cdc.gov/steadi/patient.html

## 2022-10-07 NOTE — PROGRESS NOTE ADULT - ATTENDING COMMENTS
# EVELIO resolved / lactic acidosis resolved  # DC IVF/ if remains hypertensive after that consider adding procardia XL 30    will sign off recall PRN / call using TEAMS or on [phone]
37+6, preeclampsia with severe features. labor progress is stalled. Will examine at 6:30. If exam unchanged will proceed with  section
POD1 s/p 1 lft/c/s due to labor arrest with preeclampsia with severe features now with inocencia/anuria  Awaiting Nephrology consult  Awaiting ICU consult  CXR to be read  Pt downstairs for renal sono and echo  F/u 7am labs  Will sign out to incoming attending
Pt doing well. Mg running. Epidural in place. Will continue with induction. AC >97%. Anticipate 
[facility] Staff    I saw and evaluated Ms. JANET HUTCHISON  with Dr. Wei.  She does not have headaches or spots in front of her eyes.    General:  Ms. JANET HUTCHISON is sitting in a chair.  She appears comfortable.    Vital Signs Last 24 Hrs  T(C): 36.7 (07 Oct 2022 08:15), Max: 36.9 (06 Oct 2022 15:50)  T(F): 98 (07 Oct 2022 08:15), Max: 98.4 (06 Oct 2022 15:50)  HR: 99 (07 Oct 2022 08:15) (90 - 131)  BP: 120/75 (07 Oct 2022 08:15) (119/82 - 161/95)  BP(mean): --  RR: 18 (07 Oct 2022 08:15) (18 - 18)  SpO2: 97% (06 Oct 2022 10:01) (85% - 97%)  Urine output on 4D:  750 cc through 7 AM    Cardiovascular:  Normal S1 S2.  No murmurs.  Pulmonary:  Clear to auscultation bilaterally.  No wheezing.  Abdomen:  Soft, appropriately tender, no rebound, no guarding.  incision is clean/dry/intact.  There are steristrips on the incision.  Extremities:  Nontender calves.  Lower extremity edema bilaterally.  Neurology:  Deep tendon reflexes 2+ bilaterally.    Some lab values (not all)                        9.1    7.92  )-----------( 205      ( 06 Oct 2022 22:46 ).    -->             27.0                                             8.9    11.77 )-----------( 171      ( 05 Oct 2022 18:18 ) -->             26.5                                       9.0    16.42 )-----------( 150      ( 04 Oct 2022 17:45 )             26.1                         10.9   19.10 )-----------( 134      ( 04 Oct 2022 12:17 )             32.0     10-06    138  |  100  |  5   ----------------------------<  94  3.3   |  27  |  0.5  10-06    139  |  102  |  5   ----------------------------<  87  3.5   |  27  |  0.6  10-06    136  |  99  |  6   ----------------------------<  88  3.6   |  27  |  0.7  10-05    141  |  101  |  6   ----------------------------<  68  3.8   |  29  |  0.7  10-05    140  |  102  |  6   ----------------------------<  81  3.7   |  29  |  0.8    Ca    8.4      06 Oct 2022 22:46  Mg     3.8 [1.8 - 2.4]     10-06      TPro  5.4  /  Alb  2.7  /  TBili  0.3  /  DBili  x   /  AST  15  /  ALT  7   /  AlkPhos  204  10-06-22  TPro  5.6  /  Alb  2.9  /  TBili  0.3  /  DBili  x   /  AST  16  /  ALT  6   /  AlkPhos  220  10-06-22    Ms Hutchison is a 28yo P1 with preeclampsia with severe features s/p primary LTCS for arrest of dilation, POD#4.  She had likely acute kidney injury during labor and postpartum as well as a nuñez catheter issue.    Preeclampsia with severe features, still with elevated blood pressures.  - Monitor blood pressures  - Continue Hydrochlorothiazide 25 mg PO daily (started 10/6/2022)  - Continue Procardia xL 60 mg PO daily  - Strict Is and Os  - Daily weights (the recorded weight today was 239 lbs which is much lower compared to yesterday).  I asked the house staff to have the nurses repeat the weight.  - s/p magnesium sulfate and Keppra for seizure prophylaxis    Acute kidney injury  - resolved    Ambulate as tolerated    Noel Matias MD
Summary note:  Patient known to me from admission.  She was seen and assessed a the bedside during morning rounds with Dr. Wei.  She was doing well and she denied any preeclamptic symptoms.  During the night she had needed additional IV antihypertensives and I added one more dose of Lasix 20 mg. She was also started on Nifedipine 30 XL.  This morning, repeat labs showed a normalized Creatinine and rest of her labs were stable.  The plan of care made:  - Stop Keppra  - Remove Stewart  - Stop IV fluid  - Encourage ambulation   - Continue Nifedipine 30 XL

## 2022-10-07 NOTE — DISCHARGE NOTE OB - BREAST MILK PROVIDES PROTECTION AGAINST INFECTION
Respiratory attempting to deep suction pt nasally. Pt started to have bleeding from both sides of his nose.    Statement Selected

## 2022-10-07 NOTE — DISCHARGE NOTE OB - CARE PLAN
1 Principal Discharge DX:	 delivery delivered  Assessment and plan of treatment:	vitals and labs  Secondary Diagnosis:	Pre-eclampsia  Assessment and plan of treatment:	blood pressure medication

## 2022-10-08 ENCOUNTER — TRANSCRIPTION ENCOUNTER (OUTPATIENT)
Age: 27
End: 2022-10-08

## 2022-10-08 VITALS — SYSTOLIC BLOOD PRESSURE: 142 MMHG | DIASTOLIC BLOOD PRESSURE: 97 MMHG | HEART RATE: 98 BPM

## 2022-10-08 PROCEDURE — 99238 HOSP IP/OBS DSCHRG MGMT 30/<: CPT

## 2022-10-08 RX ORDER — SENNA PLUS 8.6 MG/1
1 TABLET ORAL
Qty: 30 | Refills: 0
Start: 2022-10-08 | End: 2022-11-06

## 2022-10-08 RX ORDER — NIFEDIPINE 30 MG
1 TABLET, EXTENDED RELEASE 24 HR ORAL
Qty: 30 | Refills: 0
Start: 2022-10-08 | End: 2022-11-06

## 2022-10-08 RX ORDER — OXYCODONE HYDROCHLORIDE 5 MG/1
1 TABLET ORAL
Qty: 4 | Refills: 0
Start: 2022-10-08

## 2022-10-08 RX ORDER — ACETAMINOPHEN 500 MG
2 TABLET ORAL
Qty: 56 | Refills: 0
Start: 2022-10-08 | End: 2022-10-21

## 2022-10-08 RX ORDER — SIMETHICONE 80 MG/1
1 TABLET, CHEWABLE ORAL
Qty: 14 | Refills: 0
Start: 2022-10-08 | End: 2022-10-21

## 2022-10-08 RX ADMIN — Medication 25 MILLIGRAM(S): at 06:33

## 2022-10-08 RX ADMIN — Medication 650 MILLIGRAM(S): at 01:17

## 2022-10-08 RX ADMIN — SIMETHICONE 80 MILLIGRAM(S): 80 TABLET, CHEWABLE ORAL at 11:27

## 2022-10-08 RX ADMIN — ENOXAPARIN SODIUM 40 MILLIGRAM(S): 100 INJECTION SUBCUTANEOUS at 11:27

## 2022-10-08 RX ADMIN — LEVETIRACETAM 500 MILLIGRAM(S): 250 TABLET, FILM COATED ORAL at 06:33

## 2022-10-08 RX ADMIN — Medication 1 TABLET(S): at 10:46

## 2022-10-08 RX ADMIN — SIMETHICONE 80 MILLIGRAM(S): 80 TABLET, CHEWABLE ORAL at 06:33

## 2022-10-08 RX ADMIN — Medication 650 MILLIGRAM(S): at 01:47

## 2022-10-08 RX ADMIN — SODIUM CHLORIDE 3 MILLILITER(S): 9 INJECTION INTRAMUSCULAR; INTRAVENOUS; SUBCUTANEOUS at 06:30

## 2022-10-08 NOTE — DISCHARGE NOTE NURSING/CASE MANAGEMENT/SOCIAL WORK - PATIENT PORTAL LINK FT
You can access the FollowMyHealth Patient Portal offered by [facility] by registering at the following website: [email or web address]. By joining [employer]’s FollowMyHealth portal, you will also be able to view your health information using other applications (apps) compatible with our system.

## 2022-10-08 NOTE — DISCHARGE NOTE NURSING/CASE MANAGEMENT/SOCIAL WORK - NSDCPEFALRISK_GEN_ALL_CORE
For information on Fall & Injury Prevention, visit: [email or web address] OR  [email or web address] OR  https://www.cdc.gov/steadi/patient.html

## 2022-10-08 NOTE — PROGRESS NOTE ADULT - SUBJECTIVE AND OBJECTIVE BOX
PGY 2 Note    Chief Complaint: Post  section    HPI: Pt doing well, pain well controlled. No overnight events, no acute complaints. She has been ambulating, voiding, passing gas, and tolerating regular diet without difficulty. She is breastfeeding without any issues.    ROS: Denies cardiovascular or respiratory symptoms    PAST MEDICAL & SURGICAL HISTORY:  Obesity  S/P dilation and curettage    Physical Exam  Vital Signs Last 24 Hrs  T(F): 97 (08 Oct 2022 04:23), Max: 98 (07 Oct 2022 08:15)  HR: 107 (08 Oct 2022 04:23) (95 - 107)  BP: 130/85 (08 Oct 2022 04:23) (120/75 - 142/89)  RR: 18 (08 Oct 2022 04:23) (18 - 18)    Physical exam:  General - AAOx3, NAD  Heart - S1S2, RRR  Lungs - CTA BL  Abdomen:  - Soft, appropriately tender, mildly distended, BS+. Clean, dry, intact steri strips in place over pfannenstiel skin incision.  - Fundus firm, appropriately tender, below the umbilicus  Pelvis/Vagina - Normal Lochia  Extremities - No calf tenderness, no swelling    Labs:                        9.1    7.92  )-----------( 205      ( 06 Oct 2022 22:46 )             27.0                         9.9    10.12 )-----------( 192      ( 06 Oct 2022 05:48 )             29.0

## 2022-10-08 NOTE — PROGRESS NOTE ADULT - ASSESSMENT
26yo P1 s/p primary LTCS for arrest of dilation, POD#5, , pre-eclampsia w/ severe features, s/p multiple IV antihypertensive pushes, magnesium restarted for PP hypertensive crises, now switched to Keppra for 12 more hours.  Intrinsic EVELIO resulted in decreased UO, which normalized to normal output, complicated by nuñez malfunction.    1. continue routine post partum care  2. DAILY WEIGHTS   3. Strict i's and o's  4. continue HCTZ 25mg/d and procardia 60XL for bp control and to enhance diuresis         f/u blood pressures currently elevated in the non-severe range  5. Encourage ambulation, PO hydration  6. DVT prophylaxis   7.  pain mgmt pr    OB attending to be made aware

## 2022-10-11 ENCOUNTER — APPOINTMENT (OUTPATIENT)
Dept: ANTEPARTUM | Facility: CLINIC | Age: 27
End: 2022-10-11

## 2022-10-11 LAB — FIBRINOGEN AG PPP IA-MCNC: 539 MG/DL — HIGH (ref 206–478)

## 2022-10-12 DIAGNOSIS — E87.20 ACIDOSIS, UNSPECIFIED: ICD-10-CM

## 2022-10-12 DIAGNOSIS — O32.4XX0 MATERNAL CARE FOR HIGH HEAD AT TERM, NOT APPLICABLE OR UNSPECIFIED: ICD-10-CM

## 2022-10-12 DIAGNOSIS — E66.01 MORBID (SEVERE) OBESITY DUE TO EXCESS CALORIES: ICD-10-CM

## 2022-10-12 DIAGNOSIS — O36.63X0 MATERNAL CARE FOR EXCESSIVE FETAL GROWTH, THIRD TRIMESTER, NOT APPLICABLE OR UNSPECIFIED: ICD-10-CM

## 2022-10-12 DIAGNOSIS — N17.9 ACUTE KIDNEY FAILURE, UNSPECIFIED: ICD-10-CM

## 2022-10-12 DIAGNOSIS — Z3A.37 37 WEEKS GESTATION OF PREGNANCY: ICD-10-CM

## 2022-10-18 ENCOUNTER — APPOINTMENT (OUTPATIENT)
Dept: ANTEPARTUM | Facility: CLINIC | Age: 27
End: 2022-10-18

## 2022-10-18 PROBLEM — E66.9 OBESITY, UNSPECIFIED: Chronic | Status: ACTIVE | Noted: 2022-10-05

## 2022-10-20 ENCOUNTER — APPOINTMENT (OUTPATIENT)
Dept: OBGYN | Facility: CLINIC | Age: 27
End: 2022-10-20

## 2022-10-20 VITALS
HEIGHT: 62 IN | SYSTOLIC BLOOD PRESSURE: 120 MMHG | WEIGHT: 210 LBS | DIASTOLIC BLOOD PRESSURE: 75 MMHG | BODY MASS INDEX: 38.64 KG/M2

## 2022-10-20 PROCEDURE — 99213 OFFICE O/P EST LOW 20 MIN: CPT

## 2022-10-20 NOTE — HISTORY OF PRESENT ILLNESS
[FreeTextEntry1] : ---   28 Y/O    HERE FOR F-U AFTER C/S;PET; BOY;NOT NURSING; 10/3/22    PT WITH MILD CRAMPS.\par PMHX;\par PSHX;\par SOCIAL;-ETOH   -CIGG       \par STD;              DISCUSSED CONDOMS\par FAMILY HX OF BREAST CANCER;\par REVIEW OF SYMPTOMS DONE\par ALLERGIES;  Patient has answered NKDA\par Medication reconciliation was completed by reviewing, with the patient's\par involvement, the patient's current outpatient medications and those \par ordered for the patient today. \par \par PE; /75\par ABD SOFT NT ND\par INCISION CDI\par EXT -CCE\par \par A;P;STABLE AFTER C/S;CRAMPS\par -INSTRUCTIONS REVIEWED\par -RTC 2 WEKS\par \par

## 2022-10-24 LAB
BASOPHILS # BLD AUTO: 0.02 K/UL
BASOPHILS NFR BLD AUTO: 0.3 %
BILIRUB UR QL STRIP: NORMAL
BILIRUB UR QL STRIP: NORMAL
CLARITY UR: CLEAR
CLARITY UR: CLEAR
COLLECTION METHOD: NORMAL
COLLECTION METHOD: NORMAL
EOSINOPHIL # BLD AUTO: 0.08 K/UL
EOSINOPHIL NFR BLD AUTO: 1.1 %
GLUCOSE 1H P 50 G GLC PO SERPL-MCNC: 114 MG/DL
GLUCOSE UR-MCNC: NORMAL
GLUCOSE UR-MCNC: NORMAL
HCG UR QL: 0.2 EU/DL
HCG UR QL: 1 EU/DL
HCT VFR BLD CALC: 33.6 %
HGB BLD-MCNC: 10.2 G/DL
HGB UR QL STRIP.AUTO: NORMAL
HGB UR QL STRIP.AUTO: NORMAL
IMM GRANULOCYTES NFR BLD AUTO: 0.4 %
KETONES UR-MCNC: NORMAL
KETONES UR-MCNC: NORMAL
LEUKOCYTE ESTERASE UR QL STRIP: NORMAL
LEUKOCYTE ESTERASE UR QL STRIP: NORMAL
LYMPHOCYTES # BLD AUTO: 2.1 K/UL
LYMPHOCYTES NFR BLD AUTO: 29.9 %
MAN DIFF?: NORMAL
MCHC RBC-ENTMCNC: 28.7 PG
MCHC RBC-ENTMCNC: 30.4 G/DL
MCV RBC AUTO: 94.6 FL
MONOCYTES # BLD AUTO: 0.49 K/UL
MONOCYTES NFR BLD AUTO: 7 %
NEUTROPHILS # BLD AUTO: 4.31 K/UL
NEUTROPHILS NFR BLD AUTO: 61.3 %
NITRITE UR QL STRIP: NORMAL
NITRITE UR QL STRIP: NORMAL
PH UR STRIP: 6
PH UR STRIP: NORMAL
PLATELET # BLD AUTO: 202 K/UL
PROT UR STRIP-MCNC: 100
PROT UR STRIP-MCNC: NORMAL
RBC # BLD: 3.55 M/UL
RBC # FLD: 13.2 %
SP GR UR STRIP: 1.02
SP GR UR STRIP: 1.03
WBC # FLD AUTO: 7.03 K/UL

## 2022-11-10 ENCOUNTER — APPOINTMENT (OUTPATIENT)
Dept: OBGYN | Facility: CLINIC | Age: 27
End: 2022-11-10

## 2022-11-17 ENCOUNTER — APPOINTMENT (OUTPATIENT)
Dept: OBGYN | Facility: CLINIC | Age: 27
End: 2022-11-17

## 2022-11-17 VITALS
WEIGHT: 173 LBS | DIASTOLIC BLOOD PRESSURE: 75 MMHG | SYSTOLIC BLOOD PRESSURE: 120 MMHG | HEIGHT: 62 IN | BODY MASS INDEX: 31.83 KG/M2

## 2022-11-17 DIAGNOSIS — R10.2 PELVIC AND PERINEAL PAIN: ICD-10-CM

## 2022-11-17 PROCEDURE — 99024 POSTOP FOLLOW-UP VISIT: CPT

## 2022-11-17 NOTE — HISTORY OF PRESENT ILLNESS
[FreeTextEntry1] : ---   28 Y/O    HERE FOR F-U AFTER C/S;PET; BOY;NOT NURSING; 10/3/22    PT WITH MILD CRAMPS.\par PMHX;\par PSHX;\par SOCIAL;-ETOH   -CIGG       \par STD;              DISCUSSED CONDOMS\par FAMILY HX OF BREAST CANCER;\par REVIEW OF SYMPTOMS DONE\par ALLERGIES;  Patient has answered NKDA\par Medication reconciliation was completed by reviewing, with the patient's\par involvement, the patient's current outpatient medications and those \par ordered for the patient today. \par \par PE; \par ABD SOFT NT ND\par INCISION CDI\par EXT -CCE\par \par A;P;STABLE AFTER C/S;CRAMPS\par -INSTRUCTIONS REVIEWED\par -RTC 4WEEKS\par \par

## 2022-12-20 ENCOUNTER — APPOINTMENT (OUTPATIENT)
Dept: OBGYN | Facility: CLINIC | Age: 27
End: 2022-12-20

## 2022-12-29 ENCOUNTER — APPOINTMENT (OUTPATIENT)
Dept: OBGYN | Facility: CLINIC | Age: 27
End: 2022-12-29
Payer: MEDICAID

## 2022-12-29 NOTE — HISTORY OF PRESENT ILLNESS
[FreeTextEntry1] : ---   26 Y/O    HERE FOR  PP EXAM; C/S;PET; BOY;NOT NURSING; 10/3/22   \par PMHX;\par PSHX;\par SOCIAL;-ETOH   -CIGG       \par STD;              DISCUSSED CONDOMS\par FAMILY HX OF BREAST CANCER;\par REVIEW OF SYMPTOMS DONE\par ALLERGIES;  Patient has answered NKDA\par Medication reconciliation was completed by reviewing, with the patient's\par involvement, the patient's current outpatient medications and those \par ordered for the patient today. \par \par PE; \par ABD SOFT NT ND\par INCISION CDI WELL HEALED\par NL GENITALIA\par VAGINA -DC\par CX-CMT\par UTERUS NL SZIE NT\par ADNEXA NT - MASSES\par EXT -CCE\par \par A;P;PP EXAM AFTER C/S\par -=PAP GC CHLAMYDIA\par -CONTRACEPTION DISCUSSED\par -F-U PRN.\par \par

## 2023-01-17 ENCOUNTER — APPOINTMENT (OUTPATIENT)
Dept: OBGYN | Facility: CLINIC | Age: 28
End: 2023-01-17
Payer: MEDICAID

## 2023-01-17 DIAGNOSIS — N76.2 ACUTE VULVITIS: ICD-10-CM

## 2023-01-17 PROCEDURE — 99213 OFFICE O/P EST LOW 20 MIN: CPT

## 2023-01-17 NOTE — HISTORY OF PRESENT ILLNESS
[FreeTextEntry1] : ---   28 Y/O    HERE C/O BUMP ON VAGINA ;MILD TENDER\par PMHX;\par PSHX;\par SOCIAL;-ETOH   -CIGG       \par STD;              DISCUSSED CONDOMS\par FAMILY HX OF BREAST CANCER;\par REVIEW OF SYMPTOMS DONE\par ALLERGIES;  Patient has answered NKDA\par Medication reconciliation was completed by reviewing, with the patient's\par involvement, the patient's current outpatient medications and those \par ordered for the patient today. \par \par PE; \par ABD SOFT NT ND\par NL GENITALIA SMALL BOIL LEFT VULVA\par VAGINA -DC\par CX-CMT\par UTERUS NL SZIE NT\par ADNEXA NT - MASSES\par EXT -CCE\par \par A;P;FOLLICULITIS/VULVITIS\par -WARMD SOAKS\par -REASSURED PT\par -F-U PRN.\par \par A;P;PP EXAM AFTER C/S\par -=PAP GC CHLAMYDIA\par -CONTRACEPTION DISCUSSED\par -F-U PRN.\par \par

## 2023-03-20 NOTE — ED ADULT TRIAGE NOTE - NS ED TRIAGE AVPU SCALE
Refill request for ubrogepant 100mg tablet    Last refill was 1/31/23 for #10 with 6 refills    Last visit 1/31/23 per visit note  Impression:  Migraine headaches.  I will switch the patient over to Qulipta 60 mg a day for prevention.  For breakthrough p.r.n. medications I will have the Ubrelvy 100 mg tablets available.  I would like see the patient back in 6 months for follow-up.  He will discontinue the Nurtec but continue topiramate    Next visit scheduled 7/28/23    message sent to pharmacy to verify if has received above refills  
Alert-The patient is alert, awake and responds to voice. The patient is oriented to time, place, and person. The triage nurse is able to obtain subjective information.

## 2023-07-25 ENCOUNTER — APPOINTMENT (OUTPATIENT)
Dept: OBGYN | Facility: CLINIC | Age: 28
End: 2023-07-25
Payer: MEDICAID

## 2023-07-25 PROCEDURE — 76817 TRANSVAGINAL US OBSTETRIC: CPT

## 2023-07-25 PROCEDURE — 99213 OFFICE O/P EST LOW 20 MIN: CPT

## 2023-07-25 NOTE — HISTORY OF PRESENT ILLNESS
[FreeTextEntry1] : ---   27 Y/O    LMP 23 EDC 3/25/24 EGA 5 WEEKS\par PMHX;\par PSHX; C/S FOR PET\par SOCIAL;-ETOH   -CIGG       \par STD;              DISCUSSED CONDOMS\par FAMILY HX OF BREAST CANCER;\par REVIEW OF SYMPTOMS DONE\par ALLERGIES;  Patient has answered NKDA\par Medication reconciliation was completed by reviewing, with the patient's\par involvement, the patient's current outpatient medications and those \par ordered for the patient today. \par \par PE; \par ABD SOFT NT ND\par NL GENITALIA\par VAGINA -DC\par CX-CMT\par UTERUS NL SZIE NT\par ADNEXA NT - MASSES\par EXT -CCE\par \par A;P;EARLY PREGNANCY;DESIRES VTOP\par -SONO\par -DISCUSSED VTOP OPTIONS\par -ER INSTRUCTIONS REVIEWED\par -ANSWERED QUESTIONS.\par

## 2023-08-01 ENCOUNTER — APPOINTMENT (OUTPATIENT)
Dept: OBGYN | Facility: CLINIC | Age: 28
End: 2023-08-01
Payer: MEDICAID

## 2023-08-01 PROCEDURE — 76830 TRANSVAGINAL US NON-OB: CPT

## 2023-08-01 PROCEDURE — 99213 OFFICE O/P EST LOW 20 MIN: CPT

## 2023-08-01 PROCEDURE — 76856 US EXAM PELVIC COMPLETE: CPT

## 2023-08-01 NOTE — HISTORY OF PRESENT ILLNESS
[FreeTextEntry1] : ---   27 Y/O    LMP 23 EDC 3/25/24 EGA 6 WEEKS;SONO TODAY C/W BLIGHTED OVUM PMHX; PSHX; C/S FOR PET SOCIAL;-ETOH   -CIGG        STD;              DISCUSSED CONDOMS FAMILY HX OF BREAST CANCER; REVIEW OF SYMPTOMS DONE ALLERGIES;  Patient has answered NKDA Medication reconciliation was completed by reviewing, with the patient's involvement, the patient's current outpatient medications and those  ordered for the patient today.   PE;  ABD SOFT NT ND NL GENITALIA VAGINA -DC CX-CMT UTERUS NL SZIE NT ADNEXA NT - MASSES EXT -CCE  A;P;BLIGHTED OVUM -WE DISCUSSED MEDICAL VS SURGICAL VS CONSERVATIVE MGMT  RBA DISCUSSED. -PT TO CONSIDER OPTIONS -ER INSTRUCTIONS

## 2023-08-08 ENCOUNTER — APPOINTMENT (OUTPATIENT)
Dept: OBGYN | Facility: CLINIC | Age: 28
End: 2023-08-08
Payer: MEDICAID

## 2023-08-08 DIAGNOSIS — O02.0 BLIGHTED OVUM AND NONHYDATIDIFORM MOLE: ICD-10-CM

## 2023-08-08 PROCEDURE — 76817 TRANSVAGINAL US OBSTETRIC: CPT

## 2023-08-08 PROCEDURE — 99213 OFFICE O/P EST LOW 20 MIN: CPT

## 2023-08-08 RX ORDER — MISOPROSTOL 200 UG/1
200 TABLET ORAL
Qty: 4 | Refills: 0 | Status: ACTIVE | COMMUNITY
Start: 2023-08-08 | End: 1900-01-01

## 2023-08-08 NOTE — HISTORY OF PRESENT ILLNESS
[FreeTextEntry1] : ---   27 Y/O    HERE FOR F-U;MARQUISE SHOWS IUP AT 6.1 WEEKS PMHX; PSHX; C/S FOR PET SOCIAL;-ETOH   -CIGG        STD;              DISCUSSED CONDOMS FAMILY HX OF BREAST CANCER; REVIEW OF SYMPTOMS DONE ALLERGIES;  Patient has answered NKDA Medication reconciliation was completed by reviewing, with the patient's involvement, the patient's current outpatient medications and those  ordered for the patient today.   PE;  ABD SOFT NT ND NL GENITALIA VAGINA -DC CX-CMT UTERUS NL SZIE NT ADNEXA NT - MASSES EXT -CCE  A;P;EARLY PREGNANCY -PT REQUESTING VTOP -ANSWERED QUESTIONS -ER INSTRUCTIONS REVIEWED.

## 2023-08-09 ENCOUNTER — NON-APPOINTMENT (OUTPATIENT)
Age: 28
End: 2023-08-09

## 2023-08-10 ENCOUNTER — LABORATORY RESULT (OUTPATIENT)
Age: 28
End: 2023-08-10

## 2023-08-10 ENCOUNTER — OUTPATIENT (OUTPATIENT)
Dept: OUTPATIENT SERVICES | Facility: HOSPITAL | Age: 28
LOS: 1 days | End: 2023-08-10
Payer: MEDICAID

## 2023-08-10 DIAGNOSIS — Z98.890 OTHER SPECIFIED POSTPROCEDURAL STATES: Chronic | ICD-10-CM

## 2023-08-10 DIAGNOSIS — Z00.00 ENCOUNTER FOR GENERAL ADULT MEDICAL EXAMINATION W/OUT ABNORMAL FINDINGS: ICD-10-CM

## 2023-08-10 PROCEDURE — 36415 COLL VENOUS BLD VENIPUNCTURE: CPT

## 2023-08-10 PROCEDURE — 87661 TRICHOMONAS VAGINALIS AMPLIF: CPT

## 2023-08-10 PROCEDURE — 87591 N.GONORRHOEAE DNA AMP PROB: CPT

## 2023-08-10 PROCEDURE — 86850 RBC ANTIBODY SCREEN: CPT

## 2023-08-10 PROCEDURE — 86901 BLOOD TYPING SEROLOGIC RH(D): CPT

## 2023-08-10 PROCEDURE — 85027 COMPLETE CBC AUTOMATED: CPT

## 2023-08-10 PROCEDURE — 87389 HIV-1 AG W/HIV-1&-2 AB AG IA: CPT

## 2023-08-10 PROCEDURE — 87491 CHLMYD TRACH DNA AMP PROBE: CPT

## 2023-08-10 PROCEDURE — 87340 HEPATITIS B SURFACE AG IA: CPT

## 2023-08-10 PROCEDURE — 86900 BLOOD TYPING SEROLOGIC ABO: CPT

## 2023-08-10 PROCEDURE — 86780 TREPONEMA PALLIDUM: CPT

## 2023-08-10 PROCEDURE — 86701 HIV-1ANTIBODY: CPT

## 2023-08-13 LAB
ABO + RH PNL BLD: NORMAL
BLD GP AB SCN SERPL QL: NORMAL
C TRACH RRNA SPEC QL NAA+PROBE: NOT DETECTED
HBV SURFACE AG SER QL: NONREACTIVE
N GONORRHOEA RRNA SPEC QL NAA+PROBE: NOT DETECTED
SOURCE AMPLIFICATION: NORMAL
SOURCE AMPLIFICATION: NORMAL
T PALLIDUM AB SER QL IA: NEGATIVE
T VAGINALIS RRNA SPEC QL NAA+PROBE: NOT DETECTED

## 2023-08-17 ENCOUNTER — OUTPATIENT (OUTPATIENT)
Dept: OUTPATIENT SERVICES | Facility: HOSPITAL | Age: 28
LOS: 1 days | End: 2023-08-17
Payer: MEDICAID

## 2023-08-17 ENCOUNTER — APPOINTMENT (OUTPATIENT)
Dept: OBGYN | Facility: CLINIC | Age: 28
End: 2023-08-17
Payer: MEDICAID

## 2023-08-17 VITALS
HEIGHT: 62 IN | DIASTOLIC BLOOD PRESSURE: 64 MMHG | SYSTOLIC BLOOD PRESSURE: 110 MMHG | WEIGHT: 230 LBS | BODY MASS INDEX: 42.33 KG/M2 | HEART RATE: 58 BPM | OXYGEN SATURATION: 98 %

## 2023-08-17 DIAGNOSIS — Z98.890 OTHER SPECIFIED POSTPROCEDURAL STATES: Chronic | ICD-10-CM

## 2023-08-17 DIAGNOSIS — Z64.0 PROBLEMS RELATED TO UNWANTED PREGNANCY: ICD-10-CM

## 2023-08-17 PROCEDURE — 76815 OB US LIMITED FETUS(S): CPT | Mod: 26

## 2023-08-17 PROCEDURE — 76815 OB US LIMITED FETUS(S): CPT

## 2023-08-17 PROCEDURE — 99215 OFFICE O/P EST HI 40 MIN: CPT

## 2023-08-17 PROCEDURE — 99214 OFFICE O/P EST MOD 30 MIN: CPT

## 2023-08-17 RX ORDER — IBUPROFEN 800 MG/1
800 TABLET, FILM COATED ORAL EVERY 8 HOURS
Qty: 15 | Refills: 0 | Status: ACTIVE | COMMUNITY
Start: 2023-08-17 | End: 1900-01-01

## 2023-08-17 RX ORDER — MISOPROSTOL 200 UG/1
200 TABLET ORAL
Qty: 8 | Refills: 0 | Status: ACTIVE | COMMUNITY
Start: 2023-08-17 | End: 1900-01-01

## 2023-08-17 RX ORDER — PROMETHAZINE HYDROCHLORIDE 25 MG/1
25 TABLET ORAL
Qty: 5 | Refills: 0 | Status: ACTIVE | COMMUNITY
Start: 2023-08-17 | End: 1900-01-01

## 2023-08-22 DIAGNOSIS — Z70.8 OTHER SEX COUNSELING: ICD-10-CM

## 2023-08-22 DIAGNOSIS — Z64.0 PROBLEMS RELATED TO UNWANTED PREGNANCY: ICD-10-CM

## 2023-08-22 DIAGNOSIS — Z30.09 ENCOUNTER FOR OTHER GENERAL COUNSELING AND ADVICE ON CONTRACEPTION: ICD-10-CM

## 2023-08-30 NOTE — HISTORY OF PRESENT ILLNESS
[FreeTextEntry1] : Patient presenting today for termination of pregnancy. She has previously been seen by Dr. Lee for Ob-Gyn care and had an US confirming IUP.   ObHx: CS x1, Induced  x2 (D&C)  PMH: Anemia (recent labs hg 12), marijuana use  PSH: CS x1, D&C x2   __________________________________________________________________   MEDICATION  ELIGIBILITY SCREEN   Requirements: (Any "no" answers means not eligible)    IUP with gestational age < 77 days (11 weeks):   YES Willing to undergo surgical  if medical  fails:  YES Has an adult support person available after misoprostol administration:  YES Has access to a phone at all times:  YES Able and agrees to follow up plan : YES   Exclusions: (Any "yes" answer means may not be eligible)   Known allergy to mifepristone/misoprostol:  NO Current anticoagulant therapy: NO Personal history of clotting disorder:  NO Current use of oral corticosteroids: NO Chronic adrenal failure: NO Personal history of porphyria: NO  IUD in place:  NO Anemia (hemoglobin = 9.5 g/dL) or symptoms/risk factors for anemia:  NO Heart disease with impaired function requiring medications:  NO Known large fibroid uterus:  NO Other serious medical problem: NO  __________________________________________________________________

## 2023-08-30 NOTE — PLAN
[FreeTextEntry1] : ASSESSMENT   Patients is a 27yo  @ 7+3 by 6w  who presents for medication  for pregnancy termination.   Options Counseling: The patient was counseled about her pregnancy options of parenthood, adoption and . She expressed her sure decision for pregnancy termination.   The patient was counseled on medical and surgical  procedures and wishes to proceed with medical termination of pregnancy.    Risk of medical , including but not limited to, infection, retained products of conception, continuing pregnancy, hemorrhage, need for surgical  or D&C for incomplete  (~5%), potential for teratogenesis in this pregnancy if treatment unsuccessful, and death discussed. Discussed side effects, warning symptoms and pain management. Patient agreement forms were signed after discussion of risks and benefits of all management options.      PLAN   #Medication  protocol reviewed in detail.   Mifepristone 200 mg (see MA note) was administered orally in the clinic today after counseling and review of consent forms.  They were  given prescriptions for misoprostol 800 mcg. They were given an additional dose of Misoprostol with instructions to administer 2nd dose of 800mcg of Misoprostol regardless of symptom onset.   They were  given prescriptions for ibuprofen 800 mg for pain as well as promethazine for nausea.  They were advised to take a dose of promethazine with their first dose of ibuprofen (before or at the time of taking misoprostol) to potentially help with pain.   Extensive bleeding and infection precautions were reviewed and written instructions were given to her.  Emergency contact information was provided.   #Follow up: 1-2 weeks for in person visit, ultrasound   #Labs: CBC, T&S, screening completed  - Hg 12.6, B+, GC/CT/Trich negative, HIV, RPR , Hep B negative reviewed    #STI screening: Patient was offered screening for sexually transmitted infections.

## 2023-08-31 ENCOUNTER — APPOINTMENT (OUTPATIENT)
Dept: OBGYN | Facility: CLINIC | Age: 28
End: 2023-08-31

## 2023-09-05 ENCOUNTER — APPOINTMENT (OUTPATIENT)
Dept: OBGYN | Facility: CLINIC | Age: 28
End: 2023-09-05
Payer: MEDICAID

## 2023-09-05 ENCOUNTER — OUTPATIENT (OUTPATIENT)
Dept: OUTPATIENT SERVICES | Facility: HOSPITAL | Age: 28
LOS: 1 days | End: 2023-09-05
Payer: MEDICAID

## 2023-09-05 DIAGNOSIS — Z00.00 ENCOUNTER FOR GENERAL ADULT MEDICAL EXAMINATION WITHOUT ABNORMAL FINDINGS: ICD-10-CM

## 2023-09-05 DIAGNOSIS — Z30.9 ENCOUNTER FOR CONTRACEPTIVE MANAGEMENT, UNSPECIFIED: ICD-10-CM

## 2023-09-05 DIAGNOSIS — Z98.890 OTHER SPECIFIED POSTPROCEDURAL STATES: Chronic | ICD-10-CM

## 2023-09-05 PROCEDURE — 76857 US EXAM PELVIC LIMITED: CPT

## 2023-09-05 PROCEDURE — 99213 OFFICE O/P EST LOW 20 MIN: CPT

## 2023-09-05 PROCEDURE — 76857 US EXAM PELVIC LIMITED: CPT | Mod: 26

## 2023-09-05 RX ORDER — ULIPRISTAL ACETATE 30 MG/1
30 TABLET ORAL
Qty: 5 | Refills: 0 | Status: ACTIVE | COMMUNITY
Start: 2023-09-05 | End: 1900-01-01

## 2023-09-05 NOTE — PROCEDURE
[de-identified] : transabdominal ultrasound [FreeTextEntry3] : thin endometrial stripe with no color doppler flow seen consistent with successful TOP

## 2023-09-05 NOTE — HISTORY OF PRESENT ILLNESS
[FreeTextEntry1] : 29yo  s/p medication TOP with mife/miso on 23 at 6 weeks. She reports that for 2 days after taking the second dose of misoprostol she had 10/10 pelvic pain/cramping that was relieved with tylenol/motrin. She reports that she no longer has current pain. She initially had heavy bleeding 2 hours after her second dose and needed to change her pad ever 2-3 hours for 2 days. Since then, she has only had some light vaginal spotting. Denies headache, changes in vision, dizziness, light headedness, SOB, palpitations, chest pain.  For contraception, she is interested in using patches. As the patient is a current everyday smoker, discussed that patches were not a good option for her as they are high doses of estrogen and put her at risk of forming blood clots. Patient currently does not want any form of implant (Nexplanon nor IUD) and does not like the idea of any other form of progesterone contraception (including POPs and depo). She has been tracking her periods using an ismael and will use condoms for protection. Additionally, she desires emergency contraception.

## 2023-09-05 NOTE — DISCUSSION/SUMMARY
[FreeTextEntry1] : A/P 29yo  s/p successful medication TOP  # STI testing - reviewed from 8/10: GC neg, CT neg, trich neg, HepBSAg neg, HIV NR, RPR NR  # contraception - patient desires patches, counseled patient as above that she is not a good candidate for patches due to her daily smoking and the increased VTE risk, patient expressed understanding - as above, patient declining progesterone only options including Nexplanon, IUD, POPs, and depo today - patient has been tracking her periods and ovulation days and has been avoiding intercourse when she believes she is ovulating - Patient reports consistent condom use - Jennie (5 doses) sent to patient's pharmacy at patient's request for when she feels she is at risk of pregnancy despite above methods  # labs - 8/10/23: B pos, Hgb 12.6 - as patient denies symptoms of anemia, no need for follow up CBC  # plan - patient to follow up with her PMD Dr. Lee in December for her annual exam

## 2023-09-11 DIAGNOSIS — O03.9 COMPLETE OR UNSPECIFIED SPONTANEOUS ABORTION WITHOUT COMPLICATION: ICD-10-CM

## 2023-09-11 DIAGNOSIS — Z30.9 ENCOUNTER FOR CONTRACEPTIVE MANAGEMENT, UNSPECIFIED: ICD-10-CM

## 2023-09-11 DIAGNOSIS — Z30.09 ENCOUNTER FOR OTHER GENERAL COUNSELING AND ADVICE ON CONTRACEPTION: ICD-10-CM

## 2023-12-28 DIAGNOSIS — Z00.00 ENCOUNTER FOR GENERAL ADULT MEDICAL EXAMINATION WITHOUT ABNORMAL FINDINGS: ICD-10-CM

## 2023-12-29 DIAGNOSIS — Z00.00 ENCOUNTER FOR GENERAL ADULT MEDICAL EXAMINATION WITHOUT ABNORMAL FINDINGS: ICD-10-CM

## 2024-01-08 ENCOUNTER — LABORATORY RESULT (OUTPATIENT)
Age: 29
End: 2024-01-08

## 2024-01-08 ENCOUNTER — APPOINTMENT (OUTPATIENT)
Dept: OBGYN | Facility: CLINIC | Age: 29
End: 2024-01-08
Payer: MEDICAID

## 2024-01-08 PROCEDURE — 99214 OFFICE O/P EST MOD 30 MIN: CPT | Mod: 25

## 2024-01-08 PROCEDURE — 76817 TRANSVAGINAL US OBSTETRIC: CPT

## 2024-01-08 RX ORDER — VITAMIN A ACETATE, .BETA.-CAROTENE, ASCORBIC ACID, CHOLECALCIFEROL, .ALPHA.-TOCOPHEROL ACETATE, DL-, THIAMINE MONONITRATE, RIBOFLAVIN, NIACINAMIDE, PYRIDOXINE HYDROCHLORIDE, FOLIC ACID, CYANOCOBALAMIN, CALCIUM CARBONATE, FERROUS FUMARATE, ZINC OXIDE, CUPRIC OXIDE 3080; 920; 120; 400; 22; 1.84; 3; 20; 10; 1; 12; 200; 27; 25; 2 [IU]/1; [IU]/1; MG/1; [IU]/1; MG/1; MG/1; MG/1; MG/1; MG/1; MG/1; UG/1; MG/1; MG/1; MG/1; MG/1
27-1 TABLET, FILM COATED ORAL DAILY
Qty: 90 | Refills: 3 | Status: ACTIVE | COMMUNITY
Start: 2024-01-08 | End: 1900-01-01

## 2024-01-08 NOTE — HISTORY OF PRESENT ILLNESS
[FreeTextEntry1] : ---   29 Y/O    LMP 10/20/23 EDC 24 EGA 10 WEEKS.  SONO TODAY AT 6 WEEKS NO FHR PMHX; PSHX; C/S FOR PET SOCIAL;-ETOH   -CIGG        STD;              DISCUSSED CONDOMS FAMILY HX OF BREAST CANCER; REVIEW OF SYMPTOMS DONE ALLERGIES;  Patient has answered NKDA Medication reconciliation was completed by reviewing, with the patient's involvement, the patient's current outpatient medications and those  ordered for the patient today.   PE;  ABD SOFT NT ND NL GENITALIA VAGINA -DC CX-CMT UTERUS 6 WEEK  SZIE NT ADNEXA NT - MASSES EXT -CCE  A;P;PREGNANCY;PREVIOUS C/S FOR PET;DATES DO NOT CORRELATE -PAP GC CHLAMYDIA -LABS -MVI -SONO TODAY -RTC 2 WEEKS FOR SONO

## 2024-01-09 LAB
ABO + RH PNL BLD: NORMAL
BASOPHILS # BLD AUTO: 0.03 K/UL
BASOPHILS NFR BLD AUTO: 0.6 %
BLD GP AB SCN SERPL QL: NORMAL
EOSINOPHIL # BLD AUTO: 0.14 K/UL
EOSINOPHIL NFR BLD AUTO: 2.8 %
HBV SURFACE AG SER QL: NONREACTIVE
HCG SERPL-MCNC: ABNORMAL MIU/ML
HCT VFR BLD CALC: 36.8 %
HCV AB SER QL: NONREACTIVE
HCV S/CO RATIO: 0.2 S/CO
HGB A MFR BLD: 97.8 %
HGB A2 MFR BLD: 2.2 %
HGB BLD-MCNC: 12 G/DL
HGB FRACT BLD-IMP: NORMAL
HIV1+2 AB SPEC QL IA.RAPID: NONREACTIVE
IMM GRANULOCYTES NFR BLD AUTO: 0.2 %
LYMPHOCYTES # BLD AUTO: 2.32 K/UL
LYMPHOCYTES NFR BLD AUTO: 46.4 %
MAN DIFF?: NORMAL
MCHC RBC-ENTMCNC: 27.2 PG
MCHC RBC-ENTMCNC: 32.6 G/DL
MCV RBC AUTO: 83.4 FL
MONOCYTES # BLD AUTO: 0.41 K/UL
MONOCYTES NFR BLD AUTO: 8.2 %
NEUTROPHILS # BLD AUTO: 2.09 K/UL
NEUTROPHILS NFR BLD AUTO: 41.8 %
PLATELET # BLD AUTO: 247 K/UL
PROGEST SERPL-MCNC: 7.13 NG/ML
RBC # BLD: 4.41 M/UL
RBC # FLD: 13.5 %
T PALLIDUM AB SER QL IA: NEGATIVE
WBC # FLD AUTO: 5 K/UL

## 2024-01-10 LAB
HPV HIGH+LOW RISK DNA PNL CVX: NOT DETECTED
MEV IGG FLD QL IA: 294 AU/ML
MEV IGG+IGM SER-IMP: POSITIVE
RUBV IGG FLD-ACNC: 7.4 INDEX
RUBV IGG SER-IMP: POSITIVE
VZV AB TITR SER: POSITIVE
VZV IGG SER IF-ACNC: 700 INDEX

## 2024-01-15 LAB
AR GENE MUT ANL BLD/T: NORMAL
CYTOLOGY CVX/VAG DOC THIN PREP: NORMAL

## 2024-01-16 ENCOUNTER — EMERGENCY (EMERGENCY)
Facility: HOSPITAL | Age: 29
LOS: 0 days | Discharge: HOME | End: 2024-01-17
Attending: EMERGENCY MEDICINE
Payer: MEDICAID

## 2024-01-16 DIAGNOSIS — R10.30 LOWER ABDOMINAL PAIN, UNSPECIFIED: ICD-10-CM

## 2024-01-16 DIAGNOSIS — M54.50 LOW BACK PAIN, UNSPECIFIED: ICD-10-CM

## 2024-01-16 DIAGNOSIS — O03.9 COMPLETE OR UNSPECIFIED SPONTANEOUS ABORTION WITHOUT COMPLICATION: ICD-10-CM

## 2024-01-16 DIAGNOSIS — O99.891 OTHER SPECIFIED DISEASES AND CONDITIONS COMPLICATING PREGNANCY: ICD-10-CM

## 2024-01-16 DIAGNOSIS — Z98.890 OTHER SPECIFIED POSTPROCEDURAL STATES: Chronic | ICD-10-CM

## 2024-01-16 PROCEDURE — 80053 COMPREHEN METABOLIC PANEL: CPT

## 2024-01-16 PROCEDURE — 81001 URINALYSIS AUTO W/SCOPE: CPT

## 2024-01-16 PROCEDURE — 99284 EMERGENCY DEPT VISIT MOD MDM: CPT | Mod: 25

## 2024-01-16 PROCEDURE — 36415 COLL VENOUS BLD VENIPUNCTURE: CPT

## 2024-01-16 PROCEDURE — 84702 CHORIONIC GONADOTROPIN TEST: CPT

## 2024-01-16 PROCEDURE — 86901 BLOOD TYPING SEROLOGIC RH(D): CPT

## 2024-01-16 PROCEDURE — 86900 BLOOD TYPING SEROLOGIC ABO: CPT

## 2024-01-16 PROCEDURE — 86850 RBC ANTIBODY SCREEN: CPT

## 2024-01-16 PROCEDURE — 76830 TRANSVAGINAL US NON-OB: CPT

## 2024-01-16 PROCEDURE — 85025 COMPLETE CBC W/AUTO DIFF WBC: CPT

## 2024-01-16 PROCEDURE — 99284 EMERGENCY DEPT VISIT MOD MDM: CPT

## 2024-01-17 VITALS
WEIGHT: 222.67 LBS | SYSTOLIC BLOOD PRESSURE: 170 MMHG | OXYGEN SATURATION: 100 % | TEMPERATURE: 98 F | DIASTOLIC BLOOD PRESSURE: 102 MMHG | HEIGHT: 62 IN | HEART RATE: 78 BPM | RESPIRATION RATE: 22 BRPM

## 2024-01-17 VITALS
RESPIRATION RATE: 18 BRPM | HEART RATE: 67 BPM | DIASTOLIC BLOOD PRESSURE: 66 MMHG | TEMPERATURE: 98 F | SYSTOLIC BLOOD PRESSURE: 116 MMHG | OXYGEN SATURATION: 99 %

## 2024-01-17 DIAGNOSIS — O03.4 INCOMPLETE SPONTANEOUS ABORTION W/OUT COMPLICATION: ICD-10-CM

## 2024-01-17 LAB
ALBUMIN SERPL ELPH-MCNC: 4.4 G/DL — SIGNIFICANT CHANGE UP (ref 3.5–5.2)
ALP SERPL-CCNC: 78 U/L — SIGNIFICANT CHANGE UP (ref 30–115)
ALT FLD-CCNC: 8 U/L — SIGNIFICANT CHANGE UP (ref 0–41)
ANION GAP SERPL CALC-SCNC: 10 MMOL/L — SIGNIFICANT CHANGE UP (ref 7–14)
APPEARANCE UR: ABNORMAL
AST SERPL-CCNC: 10 U/L — SIGNIFICANT CHANGE UP (ref 0–41)
BASOPHILS # BLD AUTO: 0.05 K/UL — SIGNIFICANT CHANGE UP (ref 0–0.2)
BASOPHILS NFR BLD AUTO: 0.8 % — SIGNIFICANT CHANGE UP (ref 0–1)
BILIRUB SERPL-MCNC: <0.2 MG/DL — SIGNIFICANT CHANGE UP (ref 0.2–1.2)
BILIRUB UR-MCNC: NEGATIVE — SIGNIFICANT CHANGE UP
BUN SERPL-MCNC: 10 MG/DL — SIGNIFICANT CHANGE UP (ref 10–20)
CALCIUM SERPL-MCNC: 9.4 MG/DL — SIGNIFICANT CHANGE UP (ref 8.4–10.5)
CFTR MUT TESTED BLD/T: NEGATIVE
CHLORIDE SERPL-SCNC: 102 MMOL/L — SIGNIFICANT CHANGE UP (ref 98–110)
CO2 SERPL-SCNC: 28 MMOL/L — SIGNIFICANT CHANGE UP (ref 17–32)
COLOR SPEC: SIGNIFICANT CHANGE UP
CREAT SERPL-MCNC: 0.8 MG/DL — SIGNIFICANT CHANGE UP (ref 0.7–1.5)
DIFF PNL FLD: ABNORMAL
EGFR: 102 ML/MIN/1.73M2 — SIGNIFICANT CHANGE UP
EOSINOPHIL # BLD AUTO: 0.08 K/UL — SIGNIFICANT CHANGE UP (ref 0–0.7)
EOSINOPHIL NFR BLD AUTO: 1.3 % — SIGNIFICANT CHANGE UP (ref 0–8)
GLUCOSE SERPL-MCNC: 112 MG/DL — HIGH (ref 70–99)
GLUCOSE UR QL: NEGATIVE MG/DL — SIGNIFICANT CHANGE UP
HCG SERPL-ACNC: HIGH MIU/ML
HCT VFR BLD CALC: 36.9 % — LOW (ref 37–47)
HGB BLD-MCNC: 12.1 G/DL — SIGNIFICANT CHANGE UP (ref 12–16)
IMM GRANULOCYTES NFR BLD AUTO: 0.3 % — SIGNIFICANT CHANGE UP (ref 0.1–0.3)
KETONES UR-MCNC: ABNORMAL MG/DL
LEUKOCYTE ESTERASE UR-ACNC: NEGATIVE — SIGNIFICANT CHANGE UP
LYMPHOCYTES # BLD AUTO: 1.89 K/UL — SIGNIFICANT CHANGE UP (ref 1.2–3.4)
LYMPHOCYTES # BLD AUTO: 30.2 % — SIGNIFICANT CHANGE UP (ref 20.5–51.1)
MCHC RBC-ENTMCNC: 27.1 PG — SIGNIFICANT CHANGE UP (ref 27–31)
MCHC RBC-ENTMCNC: 32.8 G/DL — SIGNIFICANT CHANGE UP (ref 32–37)
MCV RBC AUTO: 82.7 FL — SIGNIFICANT CHANGE UP (ref 81–99)
MONOCYTES # BLD AUTO: 0.36 K/UL — SIGNIFICANT CHANGE UP (ref 0.1–0.6)
MONOCYTES NFR BLD AUTO: 5.8 % — SIGNIFICANT CHANGE UP (ref 1.7–9.3)
NEUTROPHILS # BLD AUTO: 3.85 K/UL — SIGNIFICANT CHANGE UP (ref 1.4–6.5)
NEUTROPHILS NFR BLD AUTO: 61.6 % — SIGNIFICANT CHANGE UP (ref 42.2–75.2)
NITRITE UR-MCNC: NEGATIVE — SIGNIFICANT CHANGE UP
NRBC # BLD: 0 /100 WBCS — SIGNIFICANT CHANGE UP (ref 0–0)
PH UR: 5.5 — SIGNIFICANT CHANGE UP (ref 5–8)
PLATELET # BLD AUTO: 260 K/UL — SIGNIFICANT CHANGE UP (ref 130–400)
PMV BLD: 10.3 FL — SIGNIFICANT CHANGE UP (ref 7.4–10.4)
POTASSIUM SERPL-MCNC: 4.2 MMOL/L — SIGNIFICANT CHANGE UP (ref 3.5–5)
POTASSIUM SERPL-SCNC: 4.2 MMOL/L — SIGNIFICANT CHANGE UP (ref 3.5–5)
PROT SERPL-MCNC: 7.6 G/DL — SIGNIFICANT CHANGE UP (ref 6–8)
PROT UR-MCNC: 100 MG/DL
RBC # BLD: 4.46 M/UL — SIGNIFICANT CHANGE UP (ref 4.2–5.4)
RBC # FLD: 13.6 % — SIGNIFICANT CHANGE UP (ref 11.5–14.5)
SODIUM SERPL-SCNC: 140 MMOL/L — SIGNIFICANT CHANGE UP (ref 135–146)
SP GR SPEC: >1.03 — HIGH (ref 1–1.03)
UROBILINOGEN FLD QL: 1 MG/DL — SIGNIFICANT CHANGE UP (ref 0.2–1)
WBC # BLD: 6.25 K/UL — SIGNIFICANT CHANGE UP (ref 4.8–10.8)
WBC # FLD AUTO: 6.25 K/UL — SIGNIFICANT CHANGE UP (ref 4.8–10.8)

## 2024-01-17 PROCEDURE — 76817 TRANSVAGINAL US OBSTETRIC: CPT | Mod: 26

## 2024-01-17 PROCEDURE — 76830 TRANSVAGINAL US NON-OB: CPT | Mod: 26

## 2024-01-17 RX ORDER — SODIUM CHLORIDE 9 MG/ML
1000 INJECTION INTRAMUSCULAR; INTRAVENOUS; SUBCUTANEOUS ONCE
Refills: 0 | Status: COMPLETED | OUTPATIENT
Start: 2024-01-17 | End: 2024-01-17

## 2024-01-17 RX ORDER — MISOPROSTOL 200 UG/1
200 TABLET ORAL ONCE
Qty: 4 | Refills: 0 | Status: ACTIVE | COMMUNITY
Start: 2024-01-17 | End: 1900-01-01

## 2024-01-17 RX ORDER — ACETAMINOPHEN 500 MG
1 TABLET ORAL
Qty: 12 | Refills: 0
Start: 2024-01-17 | End: 2024-01-19

## 2024-01-17 RX ORDER — ACETAMINOPHEN 500 MG
975 TABLET ORAL ONCE
Refills: 0 | Status: COMPLETED | OUTPATIENT
Start: 2024-01-17 | End: 2024-01-17

## 2024-01-17 RX ORDER — IBUPROFEN 200 MG
1 TABLET ORAL
Qty: 12 | Refills: 0
Start: 2024-01-17 | End: 2024-01-19

## 2024-01-17 RX ADMIN — Medication 975 MILLIGRAM(S): at 01:06

## 2024-01-17 RX ADMIN — SODIUM CHLORIDE 1000 MILLILITER(S): 9 INJECTION INTRAMUSCULAR; INTRAVENOUS; SUBCUTANEOUS at 01:07

## 2024-01-17 NOTE — ED ADULT NURSE NOTE - BIRTH SEX
Implemented All Universal Safety Interventions:  [place] to call system. Call bell, personal items and telephone within reach. Instruct patient to call for assistance. Room bathroom lighting operational. Non-slip footwear when patient is off stretcher. Physically safe environment: no spills, clutter or unnecessary equipment. Stretcher in lowest position, wheels locked, appropriate side rails in place.
Female

## 2024-01-17 NOTE — ED PROVIDER NOTE - OBJECTIVE STATEMENT
27 yo F no pmhx , LMP 2023 presenting to the ED for evaluation of crampy lower abdominal and lower back pain since 12:30pm today, states she is having a miscarriage. pt has positive pregnancy test. OBGYN Dr Lee, instructed pt to come to ED. pt denies fever, chills, vomiting, chest pain, sob, syncope.

## 2024-01-17 NOTE — CONSULT NOTE ADULT - ASSESSMENT
27 y/o , LMP 10/20/23, 12w5d GA, with incomplete , currently clinically and hemodynamically stable.     - labs and sonogram reviewed  - Discussed medication assisted passage of remainder of POC. Cytotec 800mcg buccal RX sent to pharmacy. Instructions on how to take medication discussed with patient.  - Bleeding precautions  - Recommend prescription for Tylenol/Motrin for pain control   - Rh pos  - Schedule f/u appointment with Dr. Lee within one week  - Dispo per ED    Discussed with Dr. Lee and Dr. Wood.

## 2024-01-17 NOTE — ED PROVIDER NOTE - ATTENDING APP SHARED VISIT CONTRIBUTION OF CARE
29 yo F  (3 TOP), with LMP Oct 2023 presents to ER for vaginal bleeding with crampy lower abdominal pain and lower back pain since 12:30 pm. Pt states she believes she's having a miscarriage as she has not been progressing with pregnancy normally and when she began to have these symptoms today she called her OB/GYN who instructed her to come to ER. No cp/sob/n/v/d/dizziness/dysuria.    Agree with PA exam and management. Check labs, UA, pelvic US and consult gyn.

## 2024-01-17 NOTE — CONSULT NOTE ADULT - SUBJECTIVE AND OBJECTIVE BOX
PGY1    Chief Complaint: vaginal bleeding and abdominal cramping    HPI: 29 y/o , LMP 10/20/23, 12w5d GA presents w/ heavy vaginal bleeding with passage of clots around 12:30pm . Soaked through two thin pads within 1.5hrs while at work, reports passage of some tissue. Bleeding has improved. Endorses sharp, constant lower back pain and abdominal pain/cramping that began on  around 2300. Did not take anything at home for pain. Has not had to change a pad since in ED. Pain and cramping improved with Tylenol given in ED. Denies SOB, chest pain, N/V, dizziness or lightheadedness.     Ob/Gyn History:                 LMP - 10/20/23  1x FT C/S, 3x iTOP (2x D+C, 1 mTOP)   Denies history of ovarian cysts, uterine fibroids, abnormal paps, or STIs    Denies the following: constitutional symptoms, visual symptoms, cardiovascular symptoms, respiratory symptoms, GI symptoms, musculoskeletal symptoms, skin symptoms, neurologic symptoms, hematologic symptoms, allergic symptoms, psychiatric symptoms  Except any pertinent positives listed.     Home Medications:  acetaminophen 325 mg oral tablet: 2 tab(s) orally every 6 hours, As needed, Moderate Pain (4 - 6), Severe Pain (7 - 10) (08 Oct 2022 16:33)  hydroCHLOROthiazide 25 mg oral tablet: 1 tab(s) orally once a day (08 Oct 2022 14:30)  NIFEdipine 60 mg oral tablet, extended release: 1 tab(s) orally every 24 hours (08 Oct 2022 14:30)  oxyCODONE 5 mg oral tablet: 1 tab(s) orally once, As needed, Moderate to Severe Pain (4-10) (08 Oct 2022 14:30)  senna leaf extract oral tablet: 2 tab(s) orally once a day (at bedtime) (08 Oct 2022 14:30)  simethicone 80 mg oral tablet, chewable: 1 tab(s) orally every 6 hours (08 Oct 2022 14:30)    Allergies  No Known Allergies    PAST MEDICAL & SURGICAL HISTORY:  Obesity  S/P dilation and curettage  s/p C/S    FAMILY HISTORY:  FH: hypertension (Mother)    SOCIAL HISTORY: Denies cigarette use, alcohol use, or illicit drug use. Marijuana use.     Vital Signs Last 24 Hrs  T(F): 98.3 (2024 03:01), Max: 98.3 (2024 03:01)  HR: 67 (2024 03:01) (67 - 78)  BP: 116/66 (2024 03:01) (116/66 - 170/102)  RR: 18 (2024 03:01) (18 - 22)    General Appearance - AAOx3, NAD  Abdomen - Soft, nontender, nondistended, no rebound, no rigidity, no guarding, bowel sounds present    GYN/Pelvis:   External genitalia: Normal  Vagina: 10cc dark red blood noted  Cervix: 1cm dilated w/ blood clot at cervical os  Uterus: approx 7 week sized  Adnexa: nontender    LABS:                        12.1   6.25  )-----------( 260      ( 2024 01:05 )             36.9     HCG Quantitative, Serum: 70807.0 mIU/mL (24 @ 01:05)    ABO RH Interpretation: B POS (24 @ 01:05)  Antibody Screen: NEG (24 @ 01:05)        140  |  102  |  10  ----------------------------<  112<H>  4.2   |  28  |  0.8    Ca    9.4      2024 01:05    TPro  7.6  /  Alb  4.4  /  TBili  <0.2  /  DBili  x   /  AST  10  /  ALT  8   /  AlkPhos  78    Urinalysis Basic - ( 2024 03:37 )    Color: Dark Yellow / Appearance: Cloudy / SG: >1.030 / pH: x  Gluc: x / Ketone: Trace mg/dL  / Bili: Negative / Urobili: 1.0 mg/dL   Blood: x / Protein: 100 mg/dL / Nitrite: Negative   Leuk Esterase: Negative / RBC: 1821 /HPF / WBC 3 /HPF   Sq Epi: x / Non Sq Epi: 0 /HPF / Bacteria: Negative /HPF    RADIOLOGY & ADDITIONAL STUDIES:  ACC: [id number] EXAM:  US TRANSVAGINAL   ORDERED BY: JORDEN ARAIZA     PROCEDURE DATE:  2024          INTERPRETATION:  CLINICAL INFORMATION: Miscarriage. Patient states "I had   a miscarriage this afternoon, the doctor said if I'm in a lot ofpain or   bleeding a lot to go to the ER. I'm bleeding - patient  Patient reports LMP in October, when she got the sonogram last week at OB  doctor told her she was only showing six weeks"    LMP: 10/20/2023    COMPARISON: 3/26/2022    TECHNIQUE:  Endovaginal pelvic sonogram only. Color and Spectral Doppler was   performed.    FINDINGS:  Uterus measures 11.2 x 4.8 x 5.2 cm.    Endometrial thickness 1.4 cm. No living intrauterine gestation.    Complex heterogeneous material within the cervix likely representing   remnant pockets of conception, worse in progress.    Right ovary measures 2.6 x 1.5 x 2.1 cm with normal Doppler flow.    Left ovary measures 2.3 x 1.7 x 2.6 cm with normal Doppler flow.    No adnexal masses. No free fluid.      IMPRESSION:  1.  No intrauterine living gestation.  2.  Complex material within the cervix likely representing  in   progress consistent with the history.    --- End of Report ---

## 2024-01-17 NOTE — ED ADULT NURSE NOTE - NSFALLUNIVINTERV_ED_ALL_ED
Bed/Stretcher in lowest position, wheels locked, appropriate side rails in place/Call bell, personal items and telephone in reach/Instruct patient to call for assistance before getting out of bed/chair/stretcher/Non-slip footwear applied when patient is off stretcher/[place] to call system/Physically safe environment - no spills, clutter or unnecessary equipment/Purposeful proactive rounding/Room/bathroom lighting operational, light cord in reach

## 2024-01-17 NOTE — ED PROVIDER NOTE - NSFOLLOWUPINSTRUCTIONS_ED_ALL_ED_FT
**follow up with dr du within 1 week    Incomplete Miscarriage  A miscarriage is the loss of an unborn baby (fetus) before the 20th week of pregnancy. In an incomplete miscarriage, parts of the fetus or placenta (afterbirth) remain in the body. Most miscarriages happen in the first 3 months of pregnancy. Sometimes, it happens before a woman even knows she is pregnant.    Having a miscarriage can be an emotional experience. If you have had a miscarriage, talk with your health care provider about any questions you may have about miscarrying, the grieving process, and your future pregnancy plans.    What are the causes?  This condition may be caused by:  Problems with the genes or chromosomes that make it impossible for the baby to develop normally. These problems are most often the result of random errors that occur early in development, and are not passed from parent to child (not inherited).  Infection of the cervix or uterus.  Conditions that affect hormone balance in the body.  Problems with the cervix, such as the cervix opening and thinning before pregnancy is at term (cervical insufficiency).  Problems with the uterus, such as a uterus with an abnormal shape, fibroids in the uterus, or problems that were present from birth (congenital abnormalities).  Certain medical conditions.  Smoking, drinking alcohol, or using drugs.  Injury (trauma).  Many times, the cause of a miscarriage is not known.    What are the signs or symptoms?  Symptoms of this condition include:  Vaginal bleeding or spotting, with or without cramps or pain.  Pain or cramping in the abdomen or lower back.  Passing fluid, tissue, or blood clots from the vagina.  How is this diagnosed?  This condition may be diagnosed based on:  A physical exam.  Ultrasound.  Blood tests.  Urine tests.  How is this treated?  An incomplete miscarriage may be treated with:  Dilation and curettage (D&C). This is a procedure in which the cervix is stretched open and the lining of the uterus (endometrium) is scraped to remove any remaining tissue from the pregnancy.  Medicines, such as:  Antibiotic medicine to treat infection.  Medicine to help any remaining tissue pass out of your uterus.  Medicine to reduce (contract) the size of the uterus. These medicines may be given if you have a lot of bleeding.  If you have Rh negative blood and your baby was Rh positive, you will need a shot of medicine called Rh immunoglobulinto protect future babies from Rh blood problems. "Rh-negative" and "Rh-positive" refer to whether or not the blood has a specific protein found on the surface of red blood cells (Rh factor).    Follow these instructions at home:  Medicines     Image   Take over-the-counter and prescription medicines only as told by your health care provider.  If you were prescribed antibiotic medicine, take your antibiotic as told by your health care provider. Do not stop taking the antibiotic even if you start to feel better.  Do not take NSAIDs, such as aspirin and ibuprofen, unless approved by your doctor. These medicines can cause bleeding.  Activity     Rest as directed. Ask your health care provider what activities are safe for you.  Have someone help with home and family responsibilities during this time.  General instructions     Keep track of the number of sanitary pads you use each day and how soaked (saturated) they are. Write down this information.  Monitor the amount of tissue or blood clots that you pass from your vagina. Save any large amounts of tissue for your health care provider to examine.  Do not use tampons, douche, or have sex until your health care provider approves.  To help you and your partner with the process of grieving, talk with your health care provider or seek counseling to help cope with the pregnancy loss.  When you are ready, meet with your health care provider to discuss important steps you should take for your health, as well as steps to take in order to have a healthy pregnancy in the future.  Keep all follow-up visits as told by your health care provider. This is important.  Where to find more information  The American Congress of Obstetricians and Gynecologists: www.acog.org  [place] Department of Health and Human Services Office of Women’s Health: www.womenshealth.gov  Contact a health care provider if:  You have a fever or chills.  You have a foul smelling vaginal discharge.  Get help right away if:  You have severe cramps or pain in your back or abdomen.  You pass walnut-sized (or larger) blood clots or tissue from your vagina.  You have heavy bleeding, soaking more than 1 regular sanitary pad in an hour.  You become lightheaded or weak.  You pass out.  You have feelings of sadness that take over your thoughts, or you have thoughts of hurting yourself.  Summary  In an incomplete miscarriage, parts of the fetus or placenta (afterbirth) remain in the body.  There are multiple treatment options for an incomplete miscarriage, talk to your health care provider about the best option for you.  Follow your health care provider's instructions for follow-up care.  To help you and your partner with the process of grieving, talk with your health care provider or seek counseling to help cope with the pregnancy loss.  This information is not intended to replace advice given to you by your health care provider. Make sure you discuss any questions you have with your health care provider.

## 2024-01-17 NOTE — ED ADULT TRIAGE NOTE - CHIEF COMPLAINT QUOTE
I had a miscarriage this afternoon, the doctor said if I'm in a lot of pain or bleeding a lot to go to the ER. I'm bleeding - patient   Patient reports LMP in October, when she got the sonogram last week at OB doctor told her she was only showing six weeks

## 2024-01-17 NOTE — ED PROVIDER NOTE - PROGRESS NOTE DETAILS
ANTHONY Patel has consulted OB/GYN thru [facility] pt cleared by OBGYN for discharge at this time with outpatient follow up

## 2024-01-17 NOTE — ED ADULT NURSE NOTE - OBJECTIVE STATEMENT
Pt came into the Ed c/o vaginal bleeding soaking 2 large pads in the last two hours accompanied with abdominal pain. Pt reports having a miscarriage and her doctor told her to come to the ER. Pt reports sonogram showing six weeks.

## 2024-01-17 NOTE — ED PROVIDER NOTE - PATIENT PORTAL LINK FT
You can access the FollowMyHealth Patient Portal offered by [facility] by registering at the following website: [email or web address]. By joining [employer]’s FollowMyHealth portal, you will also be able to view your health information using other applications (apps) compatible with our system.

## 2024-01-17 NOTE — ED PROVIDER NOTE - PHYSICAL EXAMINATION
GENERAL: Well-nourished, Well-developed. NAD.  HEAD: No visible or palpable bumps or hematomas. No ecchymosis behind ears B/L.  Eyes: PERRLA, EOMI.  ENMT: MMM.   CVS: Normal S1,S2. No murmurs appreciated on auscultation   RESP: No use of accessory muscles. Chest rise symmetrical with good expansion. Lungs clear to auscultation B/L. No wheezing, rales, or rhonchi auscultated.  GI: Normal auscultation of bowel sounds in all 4 quadrants. Soft, Nontender, Nondistended. No guarding or rebound tenderness. No CVAT B/L.  Skin: Warm, Dry. No rashes or lesions. Good cap refill < 2 sec B/L.  EXT: Radial and pedal pulses present B/L. No calf tenderness or swelling B/L. No palpable cords. No pedal edema B/L.

## 2024-01-17 NOTE — ED PROVIDER NOTE - CLINICAL SUMMARY MEDICAL DECISION MAKING FREE TEXT BOX
29 yo F  (3 TOP), with LMP Oct 2023 presents to ER for vaginal bleeding with crampy lower abdominal pain and lower back pain since 12:30 pm. Pt states she believes she's having a miscarriage as she has not been progressing with pregnancy normally and when she began to have these symptoms today she called her OB/GYN who instructed her to come to ER. No cp/sob/n/v/d/dizziness/dysuria.      Test results reviewed. Had POC in cervix. Seen by GYN team. Placed on meds and instructed on post med care and follow up plan. Cleared by ob/gyn, to dc.

## 2024-01-17 NOTE — ED PROVIDER NOTE - CARE PROVIDER_API CALL
Junior Lee  Obstetrics and Gynecology  [address] [place] [address]  Phone: [phone]  Fax: [phone]  Follow Up Time: 1-3 Days

## 2024-01-18 ENCOUNTER — NON-APPOINTMENT (OUTPATIENT)
Age: 29
End: 2024-01-18

## 2024-01-29 ENCOUNTER — APPOINTMENT (OUTPATIENT)
Dept: OBGYN | Facility: CLINIC | Age: 29
End: 2024-01-29

## 2024-02-02 ENCOUNTER — APPOINTMENT (OUTPATIENT)
Dept: OBGYN | Facility: CLINIC | Age: 29
End: 2024-02-02

## 2024-02-05 ENCOUNTER — APPOINTMENT (OUTPATIENT)
Dept: OBGYN | Facility: CLINIC | Age: 29
End: 2024-02-05
Payer: MEDICAID

## 2024-02-05 PROCEDURE — 93975 VASCULAR STUDY: CPT

## 2024-02-05 PROCEDURE — 99214 OFFICE O/P EST MOD 30 MIN: CPT

## 2024-02-05 PROCEDURE — 76830 TRANSVAGINAL US NON-OB: CPT

## 2024-02-05 RX ORDER — METHYLERGONOVINE MALEATE 0.2 MG/1
0.2 TABLET ORAL
Qty: 9 | Refills: 1 | Status: ACTIVE | COMMUNITY
Start: 2024-02-05 | End: 1900-01-01

## 2024-02-05 NOTE — HISTORY OF PRESENT ILLNESS
[FreeTextEntry1] : ---   27 Y/O    HERE FOR F-U AFTER MISCARRIAGE AND MEDICALTOP; SONO TODAY SHOWED RPOC;PT STILL BLEEDING ON AND OFF; PMHX; PSHX; C/S FOR PET SOCIAL;-ETOH   -CIGG        STD;              DISCUSSED CONDOMS FAMILY HX OF BREAST CANCER; REVIEW OF SYMPTOMS DONE ALLERGIES;  Patient has answered NKDA Medication reconciliation was completed by reviewing, with the patient's involvement, the patient's current outpatient medications and those  ordered for the patient today.   PE;  ABD SOFT NT ND EXT -CCE  A;P;RPOC AFTER MISCARRIAGE -METHERGINE 0.2MG PO TOD X 3 DAYS -RTC 1 WEEK -ER INSTRUCTIONS REVIEWED.

## 2024-02-12 ENCOUNTER — APPOINTMENT (OUTPATIENT)
Dept: OBGYN | Facility: CLINIC | Age: 29
End: 2024-02-12
Payer: MEDICAID

## 2024-02-12 PROCEDURE — 93975 VASCULAR STUDY: CPT

## 2024-02-12 PROCEDURE — 76830 TRANSVAGINAL US NON-OB: CPT

## 2024-02-12 PROCEDURE — 99213 OFFICE O/P EST LOW 20 MIN: CPT

## 2024-02-12 NOTE — HISTORY OF PRESENT ILLNESS
[FreeTextEntry1] : ---   29 Y/O    HERE FOR F-U AFTER MISCARRIAGE AND MEDICAL TOP; ;PT STILL BLEEDING ON AND OFF; PMHX; PSHX; C/S FOR PET SOCIAL;-ETOH   -CIGG        STD;              DISCUSSED CONDOMS FAMILY HX OF BREAST CANCER; REVIEW OF SYMPTOMS DONE ALLERGIES;  Patient has answered NKDA Medication reconciliation was completed by reviewing, with the patient's involvement, the patient's current outpatient medications and those  ordered for the patient today.   PE;  ABD SOFT NT ND EXT -CCE  A;P;RPOC AFTER MISCARRIAGE -WILL SCHEDULE  ENDO BX TO REMOVE ANY LAST RPOC -WE ALSO DISCUSSED POSSIBLE D/C IN OPERTAING ROOM -ANSWERED QUESTIONS..

## 2024-02-15 ENCOUNTER — APPOINTMENT (OUTPATIENT)
Dept: OBGYN | Facility: CLINIC | Age: 29
End: 2024-02-15
Payer: MEDICAID

## 2024-02-15 DIAGNOSIS — O03.9 COMPLETE OR UNSPECIFIED SPONTANEOUS ABORTION W/OUT COMPLICATION: ICD-10-CM

## 2024-02-15 PROCEDURE — 99213 OFFICE O/P EST LOW 20 MIN: CPT | Mod: 25

## 2024-02-15 PROCEDURE — 58100 BIOPSY OF UTERUS LINING: CPT

## 2024-02-15 NOTE — HISTORY OF PRESENT ILLNESS
[FreeTextEntry1] : ---   27 Y/O    HERE FOR F-U AFTER MISCARRIAGE AND MEDICAL TOP; ;PT STILL BLEEDING ON AND OFF;PT HAVING ENDO BX;INFORMED CONSENT OBTAINED RBA DISCUSSED. PMHX; PSHX; C/S FOR PET SOCIAL;-ETOH   -CIGG        STD;              DISCUSSED CONDOMS FAMILY HX OF BREAST CANCER; REVIEW OF SYMPTOMS DONE ALLERGIES;  Patient has answered NKDA Medication reconciliation was completed by reviewing, with the patient's involvement, the patient's current outpatient medications and those  ordered for the patient today.   PE;  ABD SOFT NT ND NL GENITALIA VAGINA  BLOOD IN VAULT CX-CMT UTERUS TOP NL SIZE NT ADNEXA NT - MASSES EXT -CCE  -TIME OUT DONE  EBL LESS THAN 5CC -ENDO BX DONE WITHOUT INCIDENT -INSTRUCTIONS REVIEWED -RTC 1 WEEK

## 2024-02-22 LAB — CORE LAB BIOPSY: NORMAL

## 2024-02-26 ENCOUNTER — APPOINTMENT (OUTPATIENT)
Dept: OBGYN | Facility: CLINIC | Age: 29
End: 2024-02-26
Payer: MEDICAID

## 2024-02-26 DIAGNOSIS — N92.6 IRREGULAR MENSTRUATION, UNSPECIFIED: ICD-10-CM

## 2024-02-26 PROCEDURE — 99213 OFFICE O/P EST LOW 20 MIN: CPT | Mod: 25

## 2024-02-26 PROCEDURE — 76830 TRANSVAGINAL US NON-OB: CPT

## 2024-02-26 NOTE — HISTORY OF PRESENT ILLNESS
[FreeTextEntry1] : ---   29 Y/O    HERE FOR F-U AFTER MISCARRIAGE AND MEDICAL TOP; ;PT HAD PROLONGED BLEEDING BUT BETTER NOW PATH  AND SONO REVIEWED. PMHX; PSHX; C/S FOR PET SOCIAL;-ETOH   -CIGG        STD;              DISCUSSED CONDOMS FAMILY HX OF BREAST CANCER; REVIEW OF SYMPTOMS DONE ALLERGIES;  Patient has answered NKDA Medication reconciliation was completed by reviewing, with the patient's involvement, the patient's current outpatient medications and those  ordered for the patient today.   PE;  ABD SOFT NT ND EXT -CCE  A;P;IRREG VB AFTER MISACRRIAGE;RESOLVED -SONO REVIEWED -F-U PRN.

## 2024-04-09 ENCOUNTER — NON-APPOINTMENT (OUTPATIENT)
Age: 29
End: 2024-04-09

## 2024-04-09 ENCOUNTER — APPOINTMENT (OUTPATIENT)
Dept: OBGYN | Facility: CLINIC | Age: 29
End: 2024-04-09

## 2024-04-09 DIAGNOSIS — N91.2 AMENORRHEA, UNSPECIFIED: ICD-10-CM

## 2024-04-09 PROCEDURE — 76817 TRANSVAGINAL US OBSTETRIC: CPT | Mod: 1L

## 2024-04-09 PROCEDURE — 99214 OFFICE O/P EST MOD 30 MIN: CPT

## 2024-04-09 RX ORDER — ONDANSETRON 8 MG/1
8 TABLET ORAL
Qty: 30 | Refills: 3 | Status: ACTIVE | COMMUNITY
Start: 2024-04-09 | End: 1900-01-01

## 2024-04-09 RX ORDER — VITAMIN A ACETATE, .BETA.-CAROTENE, ASCORBIC ACID, CHOLECALCIFEROL, .ALPHA.-TOCOPHEROL ACETATE, DL-, THIAMINE MONONITRATE, RIBOFLAVIN, NIACINAMIDE, PYRIDOXINE HYDROCHLORIDE, FOLIC ACID, CYANOCOBALAMIN, CALCIUM CARBONATE, FERROUS FUMARATE, ZINC OXIDE, CUPRIC OXIDE 9.9; 120; 920; 200; 400; 2; 12; 27; 1; 20; 10; 3; 1.84; 3080; 25 MG/1; MG/1; [IU]/1; MG/1; [IU]/1; MG/1; UG/1; MG/1; MG/1; MG/1; MG/1; MG/1; MG/1; [IU]/1; MG/1
27-1 TABLET, FILM COATED ORAL DAILY
Qty: 90 | Refills: 1 | Status: ACTIVE | COMMUNITY
Start: 2024-04-09 | End: 1900-01-01

## 2024-04-09 RX ORDER — DOXYLAMINE SUCCINATE AND PYRIDOXINE HYDROCHLORIDE 10; 10 MG/1; MG/1
10-10 TABLET, DELAYED RELEASE ORAL
Qty: 15 | Refills: 6 | Status: ACTIVE | COMMUNITY
Start: 2024-04-09 | End: 1900-01-01

## 2024-04-09 NOTE — HISTORY OF PRESENT ILLNESS
[FreeTextEntry1] : ---   27 Y/O    LMP UNSURE;  NOW PREGNANT;SONO  6.6 WEEKS  EDC 24 PMHX; PSHX; C/S FOR PET SOCIAL;-ETOH   -CIGG        STD;              DISCUSSED CONDOMS FAMILY HX OF BREAST CANCER; REVIEW OF SYMPTOMS DONE ALLERGIES;  Patient has answered NKDA Medication reconciliation was completed by reviewing, with the patient's involvement, the patient's current outpatient medications and those  ordered for the patient today.   PE;  ABD SOFT NT ND EXT -CCE  A;P;EARLY PREGNANCY -SONO REVIEWED -MVI -DICLEGIS -RTC 2 WEEKS

## 2024-04-15 ENCOUNTER — NON-APPOINTMENT (OUTPATIENT)
Age: 29
End: 2024-04-15

## 2024-05-02 ENCOUNTER — NON-APPOINTMENT (OUTPATIENT)
Age: 29
End: 2024-05-02

## 2024-05-05 RX ORDER — MAGNESIUM HYDROXIDE 1200 MG
1200 TABLET,CHEWABLE ORAL
Qty: 1 | Refills: 1 | Status: ACTIVE | COMMUNITY
Start: 2024-05-03 | End: 1900-01-01

## 2024-05-06 ENCOUNTER — APPOINTMENT (OUTPATIENT)
Dept: OBGYN | Facility: CLINIC | Age: 29
End: 2024-05-06
Payer: MEDICAID

## 2024-05-06 PROCEDURE — 0502F SUBSEQUENT PRENATAL CARE: CPT

## 2024-05-06 PROCEDURE — 76817 TRANSVAGINAL US OBSTETRIC: CPT

## 2024-05-06 PROCEDURE — 99213 OFFICE O/P EST LOW 20 MIN: CPT

## 2024-05-20 ENCOUNTER — APPOINTMENT (OUTPATIENT)
Dept: ANTEPARTUM | Facility: CLINIC | Age: 29
End: 2024-05-20
Payer: MEDICAID

## 2024-05-20 ENCOUNTER — ASOB RESULT (OUTPATIENT)
Age: 29
End: 2024-05-20

## 2024-05-20 VITALS
SYSTOLIC BLOOD PRESSURE: 118 MMHG | DIASTOLIC BLOOD PRESSURE: 76 MMHG | BODY MASS INDEX: 41.41 KG/M2 | HEART RATE: 73 BPM | HEIGHT: 62 IN | WEIGHT: 225 LBS

## 2024-05-20 PROCEDURE — 36415 COLL VENOUS BLD VENIPUNCTURE: CPT

## 2024-05-20 PROCEDURE — 76813 OB US NUCHAL MEAS 1 GEST: CPT

## 2024-06-03 ENCOUNTER — APPOINTMENT (OUTPATIENT)
Dept: OBGYN | Facility: CLINIC | Age: 29
End: 2024-06-03
Payer: MEDICAID

## 2024-06-03 PROCEDURE — 99213 OFFICE O/P EST LOW 20 MIN: CPT

## 2024-06-03 PROCEDURE — 0502F SUBSEQUENT PRENATAL CARE: CPT

## 2024-06-13 NOTE — ED ADULT TRIAGE NOTE - MEANS OF ARRIVAL
[FreeTextEntry1] : -pt called and CT scan discussed with patient. patient strongly encouraged to wear long CAM boot. discussed risks of painful non-union, delayed union questions sought and answered 
ambulatory

## 2024-06-20 NOTE — OB PROVIDER H&P - NS_SPECEXAM_OBGYN_ALL_OB
[facility] Call Center    Phone Message    May a detailed message be left on voicemail: yes     Reason for Call: Other: Patient took home pregnancy test and confirmed positive. Patient does not know LMP or estimated GA as she has irregular periods. Patient thinks maybe end of April.  Patient would like to be seen at [facility] clinic.  Per protocols, send TE if patient does not know LMP or estimated GA.     Action Taken: Other: Womens    Travel Screening: Not Applicable     Date of Service:                                                                      Yes

## 2024-07-02 ENCOUNTER — APPOINTMENT (OUTPATIENT)
Dept: OBGYN | Facility: CLINIC | Age: 29
End: 2024-07-02
Payer: MEDICAID

## 2024-07-02 PROCEDURE — 0502F SUBSEQUENT PRENATAL CARE: CPT

## 2024-07-02 PROCEDURE — 99213 OFFICE O/P EST LOW 20 MIN: CPT

## 2024-07-02 PROCEDURE — 76815 OB US LIMITED FETUS(S): CPT

## 2024-07-03 LAB
AF-AFP DISCLAIMER: NORMAL
AF-AFP MOM: 1.14
AFP CONCENTRATION: 44.16 NG/ML
AFP INTERPRETATION: NORMAL
AFP MOM CUT-OFF: 2.8
AFP PERCENTILE: 65.4
AFP SCREENING RESULT: NORMAL
AFTER SCREENING RISK OPEN SPINA BIFIDA: NORMAL
BEFORE SCREENING RISK OPEN SPINA BIFIDA: NORMAL
EXTREME ANALYTE ALERT: NO
GESTATIONAL  AGE: NORMAL
MATERNAL WGT: 225
RACE/ETHNICITY: NORMAL

## 2024-07-04 ENCOUNTER — NON-APPOINTMENT (OUTPATIENT)
Age: 29
End: 2024-07-04

## 2024-07-22 ENCOUNTER — ASOB RESULT (OUTPATIENT)
Age: 29
End: 2024-07-22

## 2024-07-22 ENCOUNTER — APPOINTMENT (OUTPATIENT)
Dept: ANTEPARTUM | Facility: CLINIC | Age: 29
End: 2024-07-22
Payer: MEDICAID

## 2024-07-22 VITALS
WEIGHT: 228 LBS | DIASTOLIC BLOOD PRESSURE: 79 MMHG | BODY MASS INDEX: 41.96 KG/M2 | HEART RATE: 86 BPM | SYSTOLIC BLOOD PRESSURE: 120 MMHG | HEIGHT: 62 IN

## 2024-07-22 PROCEDURE — 76811 OB US DETAILED SNGL FETUS: CPT

## 2024-07-22 PROCEDURE — 76817 TRANSVAGINAL US OBSTETRIC: CPT

## 2024-07-29 ENCOUNTER — APPOINTMENT (OUTPATIENT)
Dept: OBGYN | Facility: CLINIC | Age: 29
End: 2024-07-29

## 2024-07-30 ENCOUNTER — APPOINTMENT (OUTPATIENT)
Dept: OBGYN | Facility: CLINIC | Age: 29
End: 2024-07-30

## 2024-07-30 DIAGNOSIS — Z11.3 ENCOUNTER FOR SCREENING FOR INFECTIONS WITH A PREDOMINANTLY SEXUAL MODE OF TRANSMISSION: ICD-10-CM

## 2024-07-30 LAB
BILIRUB UR QL STRIP: NORMAL
GLUCOSE UR-MCNC: NORMAL
HCG UR QL: 0.2 EU/DL
HGB UR QL STRIP.AUTO: NORMAL
KETONES UR-MCNC: NORMAL
LEUKOCYTE ESTERASE UR QL STRIP: NORMAL
NITRITE UR QL STRIP: NORMAL
PH UR STRIP: 6.5
PROT UR STRIP-MCNC: NORMAL
SP GR UR STRIP: 1.01

## 2024-07-30 PROCEDURE — 76815 OB US LIMITED FETUS(S): CPT

## 2024-07-30 PROCEDURE — 99213 OFFICE O/P EST LOW 20 MIN: CPT

## 2024-07-30 PROCEDURE — 0502F SUBSEQUENT PRENATAL CARE: CPT

## 2024-08-01 LAB
C TRACH RRNA SPEC QL NAA+PROBE: NOT DETECTED
N GONORRHOEA RRNA SPEC QL NAA+PROBE: NOT DETECTED
SOURCE AMPLIFICATION: NORMAL

## 2024-08-02 NOTE — OB PROVIDER TRIAGE NOTE - NSRISKFACTORS_OBGYN_ALL_OB
Team Note Due:  Tuesday    Assessment/Intervention/Current Symtoms and Care Coordination:  Chart review and met with team, discussed pt progress, symptomology, and response to treatment.  Discussed the discharge plan and any potential impediments to discharge.    CTC met with pt and discussed discharge plan. Pt in agreement with leaving Monday morning. CTC discussed needing to wait for case management referral and how to complete this when able to complete referral.    Discharge Plan or Goal:  Continue stabilization of mental health symptoms and establish a safe discharge plan.      Dispo Plan: [facility]  on 8/5/24  Transportation: Medical cab ride to be scheduled to  at 9am.   Provisional discharge required: No   AVS complete: Yes    Barriers to Discharge:  Medication management and discharge medications.     Referral Status:  CTC sent referrals to IRTS placements on 7/24:    [facility] - 7/29 confirmed they received the referral- been assigned to [facility]. 7/31- Pt accepted to and going to [facility] on 8/5     Legal Status:  Voluntary      Contacts:  GRIS FRIAS - Mom- Ph [phone]- KVNG signed- Pt's mom is also deaf. This phone number calls an  to connect to Светлана Barrett -  - [phone] x [phone]      Upcoming Meetings and Dates/Important Information and next steps:  CTC to fax discharge summary to [employer] at [phone] upon discharge.        No

## 2024-08-25 ENCOUNTER — NON-APPOINTMENT (OUTPATIENT)
Age: 29
End: 2024-08-25

## 2024-08-26 ENCOUNTER — APPOINTMENT (OUTPATIENT)
Dept: OBGYN | Facility: CLINIC | Age: 29
End: 2024-08-26
Payer: MEDICAID

## 2024-08-26 PROCEDURE — 0502F SUBSEQUENT PRENATAL CARE: CPT

## 2024-08-26 PROCEDURE — 99213 OFFICE O/P EST LOW 20 MIN: CPT

## 2024-08-28 ENCOUNTER — NON-APPOINTMENT (OUTPATIENT)
Age: 29
End: 2024-08-28

## 2024-08-29 LAB
BASOPHILS # BLD AUTO: 0.04 K/UL
BASOPHILS NFR BLD AUTO: 0.6 %
EOSINOPHIL # BLD AUTO: 0.13 K/UL
EOSINOPHIL NFR BLD AUTO: 1.9 %
GLUCOSE 1H P 50 G GLC PO SERPL-MCNC: 89 MG/DL
HCT VFR BLD CALC: 30.6 %
HGB BLD-MCNC: 9.5 G/DL
IMM GRANULOCYTES NFR BLD AUTO: 0.3 %
LYMPHOCYTES # BLD AUTO: 2.65 K/UL
LYMPHOCYTES NFR BLD AUTO: 38.2 %
MAN DIFF?: NORMAL
MCHC RBC-ENTMCNC: 26 PG
MCHC RBC-ENTMCNC: 31 G/DL
MCV RBC AUTO: 83.6 FL
MONOCYTES # BLD AUTO: 0.46 K/UL
MONOCYTES NFR BLD AUTO: 6.6 %
NEUTROPHILS # BLD AUTO: 3.64 K/UL
NEUTROPHILS NFR BLD AUTO: 52.4 %
PLATELET # BLD AUTO: 235 K/UL
PMV BLD AUTO: 0 /100 WBCS
RBC # BLD: 3.66 M/UL
RBC # FLD: 13.4 %
WBC # FLD AUTO: 6.94 K/UL

## 2024-09-23 ENCOUNTER — APPOINTMENT (OUTPATIENT)
Dept: OBGYN | Facility: CLINIC | Age: 29
End: 2024-09-23

## 2024-09-24 ENCOUNTER — APPOINTMENT (OUTPATIENT)
Dept: OBGYN | Facility: CLINIC | Age: 29
End: 2024-09-24

## 2024-10-01 ENCOUNTER — APPOINTMENT (OUTPATIENT)
Dept: OBGYN | Facility: CLINIC | Age: 29
End: 2024-10-01
Payer: MEDICAID

## 2024-10-01 PROCEDURE — 99213 OFFICE O/P EST LOW 20 MIN: CPT

## 2024-10-01 PROCEDURE — 0502F SUBSEQUENT PRENATAL CARE: CPT

## 2024-10-28 ENCOUNTER — APPOINTMENT (OUTPATIENT)
Dept: OBGYN | Facility: CLINIC | Age: 29
End: 2024-10-28
Payer: MEDICAID

## 2024-10-28 PROCEDURE — 99213 OFFICE O/P EST LOW 20 MIN: CPT

## 2024-10-28 PROCEDURE — 0502F SUBSEQUENT PRENATAL CARE: CPT

## 2024-11-04 ENCOUNTER — APPOINTMENT (OUTPATIENT)
Dept: OBGYN | Facility: CLINIC | Age: 29
End: 2024-11-04
Payer: MEDICAID

## 2024-11-04 PROBLEM — Z34.90 PREGNANCY, UNSPECIFIED GESTATIONAL AGE: Status: ACTIVE | Noted: 2024-11-04

## 2024-11-04 PROCEDURE — 0502F SUBSEQUENT PRENATAL CARE: CPT

## 2024-11-04 PROCEDURE — 99213 OFFICE O/P EST LOW 20 MIN: CPT

## 2024-11-07 LAB — B-HEM STREP SPEC QL CULT: NORMAL

## 2024-11-08 ENCOUNTER — NON-APPOINTMENT (OUTPATIENT)
Age: 29
End: 2024-11-08

## 2024-11-11 ENCOUNTER — APPOINTMENT (OUTPATIENT)
Dept: OBGYN | Facility: CLINIC | Age: 29
End: 2024-11-11
Payer: MEDICAID

## 2024-11-11 DIAGNOSIS — Z34.90 ENCOUNTER FOR SUPERVISION OF NORMAL PREGNANCY, UNSPECIFIED, UNSPECIFIED TRIMESTER: ICD-10-CM

## 2024-11-11 PROCEDURE — 76805 OB US >/= 14 WKS SNGL FETUS: CPT

## 2024-11-11 PROCEDURE — 0502F SUBSEQUENT PRENATAL CARE: CPT

## 2024-11-11 PROCEDURE — 99213 OFFICE O/P EST LOW 20 MIN: CPT | Mod: 25

## 2024-11-18 ENCOUNTER — APPOINTMENT (OUTPATIENT)
Dept: OBGYN | Facility: CLINIC | Age: 29
End: 2024-11-18
Payer: MEDICAID

## 2024-11-18 PROCEDURE — 0502F SUBSEQUENT PRENATAL CARE: CPT

## 2024-11-22 ENCOUNTER — INPATIENT (INPATIENT)
Facility: HOSPITAL | Age: 29
LOS: 2 days | Discharge: HOME | DRG: 560 | End: 2024-11-25
Attending: OBSTETRICS & GYNECOLOGY | Admitting: OBSTETRICS & GYNECOLOGY
Payer: MEDICAID

## 2024-11-22 ENCOUNTER — NON-APPOINTMENT (OUTPATIENT)
Age: 29
End: 2024-11-22

## 2024-11-22 VITALS — HEART RATE: 89 BPM | SYSTOLIC BLOOD PRESSURE: 123 MMHG | DIASTOLIC BLOOD PRESSURE: 88 MMHG

## 2024-11-22 DIAGNOSIS — O26.899 OTHER SPECIFIED PREGNANCY RELATED CONDITIONS, UNSPECIFIED TRIMESTER: ICD-10-CM

## 2024-11-22 DIAGNOSIS — Z98.890 OTHER SPECIFIED POSTPROCEDURAL STATES: Chronic | ICD-10-CM

## 2024-11-22 LAB
APPEARANCE UR: ABNORMAL
BASOPHILS # BLD AUTO: 0.03 K/UL — SIGNIFICANT CHANGE UP (ref 0–0.2)
BASOPHILS NFR BLD AUTO: 0.3 % — SIGNIFICANT CHANGE UP (ref 0–1)
BILIRUB UR-MCNC: NEGATIVE — SIGNIFICANT CHANGE UP
COLOR SPEC: SIGNIFICANT CHANGE UP
DIFF PNL FLD: NEGATIVE — SIGNIFICANT CHANGE UP
EOSINOPHIL # BLD AUTO: 0.05 K/UL — SIGNIFICANT CHANGE UP (ref 0–0.7)
EOSINOPHIL NFR BLD AUTO: 0.6 % — SIGNIFICANT CHANGE UP (ref 0–8)
GLUCOSE UR QL: NEGATIVE MG/DL — SIGNIFICANT CHANGE UP
HCT VFR BLD CALC: 30.3 % — LOW (ref 37–47)
HGB BLD-MCNC: 9.3 G/DL — LOW (ref 12–16)
HIV 1 & 2 AB SERPL IA.RAPID: SIGNIFICANT CHANGE UP
IMM GRANULOCYTES NFR BLD AUTO: 0.8 % — HIGH (ref 0.1–0.3)
KETONES UR-MCNC: 15 MG/DL
LEUKOCYTE ESTERASE UR-ACNC: ABNORMAL
LYMPHOCYTES # BLD AUTO: 2.23 K/UL — SIGNIFICANT CHANGE UP (ref 1.2–3.4)
LYMPHOCYTES # BLD AUTO: 25.3 % — SIGNIFICANT CHANGE UP (ref 20.5–51.1)
MCHC RBC-ENTMCNC: 23.1 PG — LOW (ref 27–31)
MCHC RBC-ENTMCNC: 30.7 G/DL — LOW (ref 32–37)
MCV RBC AUTO: 75.4 FL — LOW (ref 81–99)
MONOCYTES # BLD AUTO: 0.5 K/UL — SIGNIFICANT CHANGE UP (ref 0.1–0.6)
MONOCYTES NFR BLD AUTO: 5.7 % — SIGNIFICANT CHANGE UP (ref 1.7–9.3)
NEUTROPHILS # BLD AUTO: 5.94 K/UL — SIGNIFICANT CHANGE UP (ref 1.4–6.5)
NEUTROPHILS NFR BLD AUTO: 67.3 % — SIGNIFICANT CHANGE UP (ref 42.2–75.2)
NITRITE UR-MCNC: NEGATIVE — SIGNIFICANT CHANGE UP
NRBC # BLD: 0 /100 WBCS — SIGNIFICANT CHANGE UP (ref 0–0)
PH UR: 6.5 — SIGNIFICANT CHANGE UP (ref 5–8)
PLATELET # BLD AUTO: 316 K/UL — SIGNIFICANT CHANGE UP (ref 130–400)
PMV BLD: 10.8 FL — HIGH (ref 7.4–10.4)
PRENATAL SYPHILIS TEST: SIGNIFICANT CHANGE UP
PROT UR-MCNC: 100 MG/DL
RBC # BLD: 4.02 M/UL — LOW (ref 4.2–5.4)
RBC # FLD: 13.5 % — SIGNIFICANT CHANGE UP (ref 11.5–14.5)
SP GR SPEC: >1.03 — HIGH (ref 1–1.03)
UROBILINOGEN FLD QL: 1 MG/DL — SIGNIFICANT CHANGE UP (ref 0.2–1)
WBC # BLD: 8.82 K/UL — SIGNIFICANT CHANGE UP (ref 4.8–10.8)
WBC # FLD AUTO: 8.82 K/UL — SIGNIFICANT CHANGE UP (ref 4.8–10.8)

## 2024-11-22 PROCEDURE — 84550 ASSAY OF BLOOD/URIC ACID: CPT

## 2024-11-22 PROCEDURE — 83615 LACTATE (LD) (LDH) ENZYME: CPT

## 2024-11-22 PROCEDURE — 86901 BLOOD TYPING SEROLOGIC RH(D): CPT

## 2024-11-22 PROCEDURE — 86703 HIV-1/HIV-2 1 RESULT ANTBDY: CPT

## 2024-11-22 PROCEDURE — 86850 RBC ANTIBODY SCREEN: CPT

## 2024-11-22 PROCEDURE — 85025 COMPLETE CBC W/AUTO DIFF WBC: CPT

## 2024-11-22 PROCEDURE — 86900 BLOOD TYPING SEROLOGIC ABO: CPT

## 2024-11-22 PROCEDURE — 86592 SYPHILIS TEST NON-TREP QUAL: CPT

## 2024-11-22 PROCEDURE — 81001 URINALYSIS AUTO W/SCOPE: CPT

## 2024-11-22 PROCEDURE — 80354 DRUG SCREENING FENTANYL: CPT

## 2024-11-22 PROCEDURE — 59025 FETAL NON-STRESS TEST: CPT

## 2024-11-22 PROCEDURE — 36415 COLL VENOUS BLD VENIPUNCTURE: CPT

## 2024-11-22 PROCEDURE — 80307 DRUG TEST PRSMV CHEM ANLYZR: CPT

## 2024-11-22 PROCEDURE — 80053 COMPREHEN METABOLIC PANEL: CPT

## 2024-11-22 RX ORDER — INFLUENZA VIRUS VACCINE 15; 15; 15; 15 UG/.5ML; UG/.5ML; UG/.5ML; UG/.5ML
0.5 SUSPENSION INTRAMUSCULAR ONCE
Refills: 0 | Status: DISCONTINUED | OUTPATIENT
Start: 2024-11-22 | End: 2024-11-23

## 2024-11-22 RX ORDER — TRISODIUM CITRATE DIHYDRATE AND CITRIC ACID MONOHYDRATE 500; 334 MG/5ML; MG/5ML
30 SOLUTION ORAL ONCE
Refills: 0 | Status: DISCONTINUED | OUTPATIENT
Start: 2024-11-22 | End: 2024-11-23

## 2024-11-22 RX ORDER — ACETAMINOPHEN 500MG 500 MG/1
1000 TABLET, COATED ORAL ONCE
Refills: 0 | Status: DISCONTINUED | OUTPATIENT
Start: 2024-11-22 | End: 2024-11-22

## 2024-11-22 RX ORDER — 0.9 % SODIUM CHLORIDE 0.9 %
1000 INTRAVENOUS SOLUTION INTRAVENOUS
Refills: 0 | Status: DISCONTINUED | OUTPATIENT
Start: 2024-11-22 | End: 2024-11-23

## 2024-11-22 RX ORDER — CEFAZOLIN SODIUM 10 G
2000 VIAL (EA) INJECTION ONCE
Refills: 0 | Status: COMPLETED | OUTPATIENT
Start: 2024-11-22 | End: 2024-11-22

## 2024-11-22 RX ORDER — FAMOTIDINE 20 MG/1
20 TABLET, FILM COATED ORAL ONCE
Refills: 0 | Status: DISCONTINUED | OUTPATIENT
Start: 2024-11-22 | End: 2024-11-23

## 2024-11-22 RX ADMIN — Medication 100 MILLIGRAM(S): at 23:30

## 2024-11-22 NOTE — OB RN PATIENT PROFILE - CAREGIVER
November 8, 2020        Julianne Rothman  [address] [place] [id number]    To Whom It May Concern:    This is to certify Julianne Rothman was evaluated on 11/08/20 and is unable to return to work.    Julianne Rothman should self-isolate.  ?  CDC guidelines for return to work are as follows:  · At least 24 hours have passed since fever resolution without use of fever reducing medication and   · Symptoms have improved and  · At least 10 days have passed since symptoms first appeared    **The loss of taste and smell may persist for weeks or months after recovery and do not need to delay the end of isolation.     Per CDC recommendations, employers should not require a COVID-19 test result or a healthcare provider’s note for employees who are sick to validate their illness, qualify for sick leave, or to return to work.    The Coronavirus is a rapidly evolving situation and recommendations are changing regularly to prevent spread of the disease and further loss of life.    Thank you for your understanding during these unusual times.     Electronically signed by:     King Wilson PA-C                 [id number]  [facility] [id number]     Declines

## 2024-11-22 NOTE — OB PROVIDER H&P - HISTORY OF PRESENT ILLNESS
29y  at 39w2d by stated EDWARD w/ hx of c/s 2/2 preeclamspia with severe features presenting with contractions and feeling like she needs to push. She states that her contractions started at 1530 and are more intense and more painful. She denies any vaginal bleeding or leakage of fluid. She reports fetal movement. GBS neg.     Prenatal course complicated by:   #obesity - normal 1hr gct   #hx of  section  #hx of preeclampsia with severe features - normal BP at all prenatal visits

## 2024-11-22 NOTE — OB PROVIDER H&P - NS_OBGYNHISTORY_OBGYN_ALL_OB_FT
OB Hx: eTOPx2 with D&Cx2, SABx1, c/sx1 2/2 severe preeclampsia    Gyn Hx: Remote hx of chlamydia, denies hx of ovarian cysts, uterine fibroids, abnormal pap tests

## 2024-11-22 NOTE — OB RN PATIENT PROFILE - FALL HARM RISK - UNIVERSAL INTERVENTIONS
Bed in lowest position, wheels locked, appropriate side rails in place/Call bell, personal items and telephone in reach/Instruct patient to call for assistance before getting out of bed or chair/Non-slip footwear when patient is out of bed/[place] to call system/Purposeful Proactive Rounding/Room/bathroom lighting operational, light cord in reach

## 2024-11-22 NOTE — OB PROVIDER H&P - NSHPPHYSICALEXAM_GEN_ALL_CORE
T(C): 36.8 (11-22-24 @ 22:17), Max: 36.8 (11-22-24 @ 22:17)  HR: 89 (11-22-24 @ 22:17) (89 - 89)  BP: 123/88 (11-22-24 @ 22:17) (123/88 - 123/88)  RR: 17 (11-22-24 @ 22:17) (17 - 17)  SpO2: --  BMI (kg/m2): 45.1 (11-22-24 @ 22:17)    Gen: A+OX3. NAD  Abd: Soft, Nontender. Gravid.  SVE: 3/50/-3    FHR: 140s/mod/+accels  TOCO: q-5 mins      EFW by Le[place]: 3600

## 2024-11-22 NOTE — OB PROVIDER H&P - ASSESSMENT
29y  at 39w2d w/ hx of c/s in labor, GBS neg, desires rltcs    - admit to LD  - cEFM and toco  - IVF and NPO  - ancef 2g ordered  - anticipate rltcs    D/w Dr. Hatfield and Dr. Barreto

## 2024-11-22 NOTE — OB PROVIDER H&P - NSHPLABSRESULTS_GEN_ALL_CORE
May 2024  Type and Screen: B pos   Antibody screen: neg   Rubella: immune  Varicella: immune   RPR: neg  HbSAg: neg  HIV: NR    Second Trimester: 8/26/24   1 hour Glucola: 89    Third Trimester:  GBS: neg     Sonogram:  38w3d 3604grm 80%, TAI 6.39cm  23w2d 613gr, 56%  11w1d CRL 4.23cm

## 2024-11-22 NOTE — OB PROVIDER H&P - NS_GBS_INFANT_INVASIVE_OBGYN_ALL_OB_FT
ACUTE/SICK VISIT:    ASSESSMENT/PLAN:  Cristiane was seen today for vaginal discharge.    Diagnoses and all orders for this visit:    Acute vaginitis  Vaginal discharge  Discussed vaginitis and common symptoms including non-specific vaginal discharge. Cristiane is not having any discomfort but recommend the following supportive cares to help the symptoms:   Avoid sleeper pajamas. Nightgowns, underwear free allow air to circulate.    Cotton underpants. Double-rinse underwear after washing to avoid residual irritants. Do not use fabric softeners for underwear and swimsuits.    Avoid tights, leotards, and leggings. Skirts and loose-fitting pants allow air to circulate.    Daily warm bathing is helpful as follows:  Allow the child to soak in clean water (no soap) for 10 to 15 minutes. Adding baking soda to the water may or may not be more beneficial.     Use soap to wash regions other than the genital area just before taking the child out of the tub. Do not use any soap on genital areas.    Rinse the genital area well and gently pat dry. A hair dryer on the cool setting may be helpful to assist with drying the genital region.    Do not use bubble baths or perfumed soaps.    If the vulvar area is tender or swollen, cool compresses may relieve the discomfort. Wet wipes can be used instead of toilet paper for wiping as long as they don't cause a \"stinging\" sensation. Emollients may help protect skin.    Review hygiene with the child. Emphasize wiping front-to-back after bowel movements. Have her sit with knees apart to reduce reflux of urine into the vagina. If she has trouble with this position because of small size, she can use a smaller detachable seat or sit backwards on the toilet (facing the toilet). Children younger than five should be supervised or assisted in toilet hygiene.    Avoid letting children sit in wet swimsuits for long periods of time after swimming.    These techniques usually result in resolution of  symptoms within two to three weeks.     If not improving in a few weeks (2-3) or if worsening then she should be seen.     Mari Conner PA-C, CAQ-Peds        CC: Vaginal Discharge      SUBJECTIVE:  HPI:  Cristiane Higuera is a 2 year old female who presents with Father.     For 2 weeks, Cristiane Higuera has had vaginal discharge. They have noticed some yellowish/white discharge dried in her underwear throughout the day. Dad says some days are worse than other. She does take bubble baths so they stopped those last week and the discharge went away for a few days but then came back. Cristiane is not bothered by this at all. She is not having itchy, pain, dysuria. Denies fevers, belly pain, vomiting, back pain, vaginal odor, vaginal bleeding. She denies a history of any other symptoms.      Appetite has been unchanged.  Cristiane Higuera has been sleeping well.  Activity is unchanged.    Social Hx:  /School: No: family; grandmother  Exposure to someone at /school with similar symptoms: No  Exposure to someone else at home with similar symptoms:  No    REVIEW OF SYSTEMS see HPI; otherwise denies HEENT, NECK, RESP, CARDIAC, GI, , NEURO or PSYCH Sx      Current Outpatient Medications   Medication Sig Dispense Refill    cetirizine (ZyrTEC) 5 MG/5ML solution Take 2.5 mLs by mouth daily for 7 days. 17.5 mL 0    atropine (ISOPTO ATROPINE) 1 % ophthalmic solution Place 1 drop into left eye 3 days a week. (Patient not taking: Reported on 2/11/2024) 5 mL 0    hydroCORTisone (CORTIZONE) 2.5 % ointment Apply 1 application. topically in the morning and 1 application. in the evening. Use until rash resolves and then as needed. (Patient not taking: Reported on 2/11/2024) 20 g 1    cetirizine (ZyrTEC Childrens Allergy) 5 MG/5ML solution Take 2.5 mLs by mouth daily as needed (itching). (Patient not taking: Reported on 2/11/2024) 118 mL 1     No current facility-administered medications for this visit.      ALLERGIES:  No Known Allergies    OBJECTIVE:  PHYSICAL EXAM:  Visit Vitals  Pulse 90   Temp 97.8 °F (36.6 °C) (Temporal)   Resp 26   Wt 16 kg (35 lb 6.1 oz)       General appearance: alert, in no acute distress. Active and interactive. Well appearing.   Lungs: clear to auscultation bilaterally. No wheezes or rhonchi. Normal work of breathing.   Heart: RRR. No murmurs, clicks, or gallops  Abdomen: BS normoactive x 4 quadrants, soft, non-distended, non-tender.   : celsa stage 1 female, no rash or lesions. No active vaginal discharge. Mild erythema at the vaginal opening. No swelling or signs of trauma.          Mari Conner PA-C, CAQ-Peds       N/A

## 2024-11-22 NOTE — OB PROVIDER H&P - PRO PRENATAL LABS ORI SOURCE RUBELLA
CC:   Chief Complaint   Patient presents with   • Other     Feels hard to breathe.  Cough. - Entered by patient   • Cough   • Shortness of Breath     Started with a cough yesterday. Today also feeling short of breath.           HPI:    ATTENDING/COLLABERATING PHYSICIAN DR. Jm Clemens is a 25 year old year old female who presents to the urgent care for evaluation of upper respiratory symptoms over the last 2 days. Symptoms include cough, sore throat, SOB, headache and fatigue. Patient denies  remarkable  fever.     Current Outpatient Medications   Medication Sig   • albuterol 108 (90 Base) MCG/ACT inhaler Inhale 2 puffs into the lungs every 4 hours as needed for Shortness of Breath or Wheezing.   • predniSONE (DELTASONE) 20 MG tablet Take 1 tablet by mouth 2 times daily. Use for 5 days   • lisdexamfetamine (Vyvanse) 40 MG capsule Take 1 capsule by mouth every morning. DX: F90.0   • lisdexamfetamine (Vyvanse) 20 MG capsule Take 1 capsule by mouth every afternoon. DX: F90.0   • hydroCORTisone (CORTIZONE) 2.5 % cream Apply 1 application. topically 3 times daily. UNTIL REDNESS DISAPPEARS   • SUMAtriptan (Imitrex) 50 MG tablet Take 1 tablet by mouth at onset of migraine. May repeat after 2 hours if needed.   • hydrOXYzine (Vistaril) 25 MG capsule Take 1 capsule by mouth 4 times daily as needed for Anxiety.   • diclofenac (VOLTAREN) 50 MG EC tablet Take 1 tablet by mouth in the morning and 1 tablet at noon and 1 tablet in the evening.   • cyclobenzaprine (FLEXERIL) 10 MG tablet Take 1 tablet by mouth 3 times daily as needed for Muscle spasms.   • mupirocin (BACTROBAN) 2 % ointment Apply topically 3 times daily.   • ondansetron (Zofran ODT) 4 MG disintegrating tablet Place 1 tablet onto the tongue every 8 hours as needed for Nausea.   • ibuprofen (MOTRIN) 400 MG tablet Take 1 tablet by mouth every 6 hours as needed for Pain.   • acetaminophen (TYLENOL) 500 MG tablet Take 500 mg by mouth every 6 hours as needed  for Pain.     No current facility-administered medications for this visit.       ALLERGIES:   Allergen Reactions   • Kiwi Other (See Comments)     \"tingling in throat and mouth\"   • Seasonal Other (See Comments)     sinus   • Tape [Adhesive   (Environmental)] RASH       Past Medical History:   Diagnosis Date   • Anxiety    • Depression    • Frequent headaches    • Irregular menstrual cycle    • Polycystic disease, ovaries    • Pre-diabetes        REVIEW OF SYSTEMS    See history of present illness otherwise 10 point review of systems is negative      PHYSICAL EXAM    Vital Signs:    Vitals:    06/27/23 1511   BP: 121/66   Pulse: 63   Resp: 16   Temp: 97.8 °F (36.6 °C)   TempSrc: Temporal   SpO2: 99%   Weight: 100 kg (220 lb 5.6 oz)   Height: 5' 7\" (1.702 m)   LMP: 06/03/2023     Constitutional:  No acute distress. Acting and behaving appropriately.   ENT:  TMs pearly gray. External auditory canals clear. Nares patent, no obvious rhinorrhea. Posterior nasopharynx and oropharynx are clear without exudates. Uvula midline. Oral mucosa pink moist intact without lesions.    Neck: Supple, nontender. No lymphadenopathy.    Respiratory:   Lungs clear to auscultation bilaterally equal rise and fall. Respirations nonlabored  Cardiovascular:  S1, S2, regular rate and rhythm. No murmurs, rubs, or gallops.        ASSESSMENT & PLAN    1. Acute upper respiratory infection        Consistent with viral upper respiratory infection. Did prescribe prednisone and albuterol. Recommending supportive care at this time to include OTC medication, rest, fluids etc. If any new or worsening issues contact clinic or seek medical attention.  All questions and concerns addressed in clinic today understood instructions clearly patient discharged in stable condition.        Orders Placed This Encounter   • albuterol 108 (90 Base) MCG/ACT inhaler   • predniSONE (DELTASONE) 20 MG tablet     See Patient Instruction section  No follow-ups on file.     SCM

## 2024-11-23 LAB
ALBUMIN SERPL ELPH-MCNC: 3 G/DL — LOW (ref 3.5–5.2)
ALP SERPL-CCNC: 164 U/L — HIGH (ref 30–115)
ALT FLD-CCNC: 8 U/L — SIGNIFICANT CHANGE UP (ref 0–41)
ANION GAP SERPL CALC-SCNC: 12 MMOL/L — SIGNIFICANT CHANGE UP (ref 7–14)
AST SERPL-CCNC: 28 U/L — SIGNIFICANT CHANGE UP (ref 0–41)
BASOPHILS # BLD AUTO: 0.01 K/UL — SIGNIFICANT CHANGE UP (ref 0–0.2)
BASOPHILS NFR BLD AUTO: 0.1 % — SIGNIFICANT CHANGE UP (ref 0–1)
BILIRUB SERPL-MCNC: 0.3 MG/DL — SIGNIFICANT CHANGE UP (ref 0.2–1.2)
BUN SERPL-MCNC: 8 MG/DL — LOW (ref 10–20)
CALCIUM SERPL-MCNC: 8.2 MG/DL — LOW (ref 8.4–10.5)
CHLORIDE SERPL-SCNC: 100 MMOL/L — SIGNIFICANT CHANGE UP (ref 98–110)
CO2 SERPL-SCNC: 21 MMOL/L — SIGNIFICANT CHANGE UP (ref 17–32)
CREAT SERPL-MCNC: 0.6 MG/DL — LOW (ref 0.7–1.5)
EGFR: 124 ML/MIN/1.73M2 — SIGNIFICANT CHANGE UP
EOSINOPHIL # BLD AUTO: 0.01 K/UL — SIGNIFICANT CHANGE UP (ref 0–0.7)
EOSINOPHIL NFR BLD AUTO: 0.1 % — SIGNIFICANT CHANGE UP (ref 0–8)
GLUCOSE SERPL-MCNC: 109 MG/DL — HIGH (ref 70–99)
HCT VFR BLD CALC: 23.1 % — LOW (ref 37–47)
HGB BLD-MCNC: 7.1 G/DL — LOW (ref 12–16)
IMM GRANULOCYTES NFR BLD AUTO: 0.6 % — HIGH (ref 0.1–0.3)
L&D DRUG SCREEN, URINE: SIGNIFICANT CHANGE UP
LDH SERPL L TO P-CCNC: 302 — HIGH (ref 50–242)
LYMPHOCYTES # BLD AUTO: 18 % — LOW (ref 20.5–51.1)
LYMPHOCYTES # BLD AUTO: 2.12 K/UL — SIGNIFICANT CHANGE UP (ref 1.2–3.4)
MCHC RBC-ENTMCNC: 23.1 PG — LOW (ref 27–31)
MCHC RBC-ENTMCNC: 30.7 G/DL — LOW (ref 32–37)
MCV RBC AUTO: 75 FL — LOW (ref 81–99)
MONOCYTES # BLD AUTO: 0.97 K/UL — HIGH (ref 0.1–0.6)
MONOCYTES NFR BLD AUTO: 8.2 % — SIGNIFICANT CHANGE UP (ref 1.7–9.3)
NEUTROPHILS # BLD AUTO: 8.59 K/UL — HIGH (ref 1.4–6.5)
NEUTROPHILS NFR BLD AUTO: 73 % — SIGNIFICANT CHANGE UP (ref 42.2–75.2)
NRBC # BLD: 0 /100 WBCS — SIGNIFICANT CHANGE UP (ref 0–0)
PLATELET # BLD AUTO: 207 K/UL — SIGNIFICANT CHANGE UP (ref 130–400)
PMV BLD: 10.8 FL — HIGH (ref 7.4–10.4)
POTASSIUM SERPL-MCNC: 4.4 MMOL/L — SIGNIFICANT CHANGE UP (ref 3.5–5)
POTASSIUM SERPL-SCNC: 4.4 MMOL/L — SIGNIFICANT CHANGE UP (ref 3.5–5)
PROT SERPL-MCNC: 5.7 G/DL — LOW (ref 6–8)
RBC # BLD: 3.08 M/UL — LOW (ref 4.2–5.4)
RBC # FLD: 13.4 % — SIGNIFICANT CHANGE UP (ref 11.5–14.5)
SODIUM SERPL-SCNC: 133 MMOL/L — LOW (ref 135–146)
URATE SERPL-MCNC: 3.4 MG/DL — SIGNIFICANT CHANGE UP (ref 2.5–7)
WBC # BLD: 11.77 K/UL — HIGH (ref 4.8–10.8)
WBC # FLD AUTO: 11.77 K/UL — HIGH (ref 4.8–10.8)

## 2024-11-23 PROCEDURE — 59510 CESAREAN DELIVERY: CPT | Mod: U9

## 2024-11-23 RX ORDER — 0.9 % SODIUM CHLORIDE 0.9 %
1000 INTRAVENOUS SOLUTION INTRAVENOUS
Refills: 0 | Status: DISCONTINUED | OUTPATIENT
Start: 2024-11-23 | End: 2024-11-25

## 2024-11-23 RX ORDER — ACETAMINOPHEN 500MG 500 MG/1
1000 TABLET, COATED ORAL ONCE
Refills: 0 | Status: COMPLETED | OUTPATIENT
Start: 2024-11-23 | End: 2024-11-23

## 2024-11-23 RX ORDER — ACETAMINOPHEN 500MG 500 MG/1
1000 TABLET, COATED ORAL EVERY 6 HOURS
Refills: 0 | Status: DISCONTINUED | OUTPATIENT
Start: 2024-11-23 | End: 2024-11-23

## 2024-11-23 RX ORDER — OXYCODONE HYDROCHLORIDE 30 MG/1
5 TABLET ORAL ONCE
Refills: 0 | Status: DISCONTINUED | OUTPATIENT
Start: 2024-11-23 | End: 2024-11-25

## 2024-11-23 RX ORDER — DIPHENHYDRAMINE HCL 25 MG
25 CAPSULE ORAL EVERY 6 HOURS
Refills: 0 | Status: DISCONTINUED | OUTPATIENT
Start: 2024-11-23 | End: 2024-11-25

## 2024-11-23 RX ORDER — OXYCODONE HYDROCHLORIDE 30 MG/1
5 TABLET ORAL
Refills: 0 | Status: DISCONTINUED | OUTPATIENT
Start: 2024-11-23 | End: 2024-11-23

## 2024-11-23 RX ORDER — OXYCODONE HYDROCHLORIDE 30 MG/1
5 TABLET ORAL ONCE
Refills: 0 | Status: DISCONTINUED | OUTPATIENT
Start: 2024-11-23 | End: 2024-11-23

## 2024-11-23 RX ORDER — IBUPROFEN 200 MG
600 TABLET ORAL EVERY 6 HOURS
Refills: 0 | Status: COMPLETED | OUTPATIENT
Start: 2024-11-23 | End: 2025-10-22

## 2024-11-23 RX ORDER — OXYCODONE HYDROCHLORIDE 30 MG/1
5 TABLET ORAL
Refills: 0 | Status: COMPLETED | OUTPATIENT
Start: 2024-11-23 | End: 2024-11-30

## 2024-11-23 RX ORDER — TETANUS TOXOID, REDUCED DIPHTHERIA TOXOID AND ACELLULAR PERTUSSIS VACCINE, ADSORBED 5; 2.5; 8; 8; 2.5 [IU]/.5ML; [IU]/.5ML; UG/.5ML; UG/.5ML; UG/.5ML
0.5 SUSPENSION INTRAMUSCULAR ONCE
Refills: 0 | Status: DISCONTINUED | OUTPATIENT
Start: 2024-11-23 | End: 2024-11-25

## 2024-11-23 RX ORDER — SIMETHICONE 125 MG
80 CAPSULE ORAL EVERY 4 HOURS
Refills: 0 | Status: DISCONTINUED | OUTPATIENT
Start: 2024-11-23 | End: 2024-11-25

## 2024-11-23 RX ORDER — .BETA.-CAROTENE, SODIUM ACETATE, ASCORBIC ACID, CHOLECALCIFEROL, .ALPHA.-TOCOPHEROL ACETATE, DL-, THIAMINE MONONITRATE, RIBOFLAVIN, NIACINAMIDE, PYRIDOXINE HYDROCHLORIDE, FOLIC ACID, CYANOCOBALAMIN, CALCIUM CARBONATE, FERROUS FUMARATE, ZINC OXIDE AND CUPRIC OXIDE 2000; 2000; 120; 400; 22; 1.84; 3; 20; 10; 1; 12; 200; 27; 25; 2 [IU]/1; [IU]/1; MG/1; [IU]/1; MG/1; MG/1; MG/1; MG/1; MG/1; MG/1; UG/1; MG/1; MG/1; MG/1; MG/1
1 TABLET ORAL DAILY
Refills: 0 | Status: DISCONTINUED | OUTPATIENT
Start: 2024-11-23 | End: 2024-11-25

## 2024-11-23 RX ORDER — IBUPROFEN 200 MG
600 TABLET ORAL EVERY 6 HOURS
Refills: 0 | Status: DISCONTINUED | OUTPATIENT
Start: 2024-11-23 | End: 2024-11-25

## 2024-11-23 RX ORDER — ACETAMINOPHEN 500MG 500 MG/1
975 TABLET, COATED ORAL EVERY 6 HOURS
Refills: 0 | Status: DISCONTINUED | OUTPATIENT
Start: 2024-11-23 | End: 2024-11-23

## 2024-11-23 RX ORDER — ENOXAPARIN SODIUM 30 MG/.3ML
40 INJECTION SUBCUTANEOUS EVERY 24 HOURS
Refills: 0 | Status: DISCONTINUED | OUTPATIENT
Start: 2024-11-23 | End: 2024-11-25

## 2024-11-23 RX ORDER — IRON SUCROSE 20 MG/ML
200 INJECTION, SOLUTION INTRAVENOUS ONCE
Refills: 0 | Status: COMPLETED | OUTPATIENT
Start: 2024-11-23 | End: 2024-11-23

## 2024-11-23 RX ORDER — ACETAMINOPHEN 500MG 500 MG/1
975 TABLET, COATED ORAL
Refills: 0 | Status: DISCONTINUED | OUTPATIENT
Start: 2024-11-23 | End: 2024-11-25

## 2024-11-23 RX ORDER — LANOLIN 72 %
1 OINTMENT (GRAM) TOPICAL EVERY 6 HOURS
Refills: 0 | Status: DISCONTINUED | OUTPATIENT
Start: 2024-11-23 | End: 2024-11-25

## 2024-11-23 RX ADMIN — IRON SUCROSE 100 MILLIGRAM(S): 20 INJECTION, SOLUTION INTRAVENOUS at 23:42

## 2024-11-23 RX ADMIN — Medication 80 MILLIGRAM(S): at 10:28

## 2024-11-23 RX ADMIN — ACETAMINOPHEN 500MG 400 MILLIGRAM(S): 500 TABLET, COATED ORAL at 05:51

## 2024-11-23 RX ADMIN — .BETA.-CAROTENE, SODIUM ACETATE, ASCORBIC ACID, CHOLECALCIFEROL, .ALPHA.-TOCOPHEROL ACETATE, DL-, THIAMINE MONONITRATE, RIBOFLAVIN, NIACINAMIDE, PYRIDOXINE HYDROCHLORIDE, FOLIC ACID, CYANOCOBALAMIN, CALCIUM CARBONATE, FERROUS FUMARATE, ZINC OXIDE AND CUPRIC OXIDE 1 TABLET(S): 2000; 2000; 120; 400; 22; 1.84; 3; 20; 10; 1; 12; 200; 27; 25; 2 TABLET ORAL at 14:49

## 2024-11-23 RX ADMIN — Medication 80 MILLIGRAM(S): at 21:06

## 2024-11-23 RX ADMIN — Medication 80 MILLIGRAM(S): at 04:32

## 2024-11-23 RX ADMIN — ACETAMINOPHEN 500MG 975 MILLIGRAM(S): 500 TABLET, COATED ORAL at 10:23

## 2024-11-23 RX ADMIN — Medication 600 MILLIGRAM(S): at 23:05

## 2024-11-23 RX ADMIN — Medication 80 MILLIGRAM(S): at 18:46

## 2024-11-23 RX ADMIN — ACETAMINOPHEN 500MG 1000 MILLIGRAM(S): 500 TABLET, COATED ORAL at 06:21

## 2024-11-23 RX ADMIN — Medication 80 MILLIGRAM(S): at 14:49

## 2024-11-23 RX ADMIN — ENOXAPARIN SODIUM 40 MILLIGRAM(S): 30 INJECTION SUBCUTANEOUS at 14:48

## 2024-11-23 RX ADMIN — Medication 600 MILLIGRAM(S): at 18:46

## 2024-11-23 NOTE — OB RN INTRAOPERATIVE NOTE - NS_SKININCISION_OBGYN_ALL_OB_DT
OUTPATIENT OFFICE VISIT EXAM NOTE        HISTORY    Lynette Barraza is a 20 year old female who presents to establish care and for the following issues:    1. Establish care:  Patient previously seen at [facility], last visit 3/2019.     - PMHx:   Depression, social anxiety: Patient has experienced symptoms of anxiety and depression on and off for 15 years. Previously on sertraline. Patient currently feels she is controlled; practices her own coping skills.  Oral contraceptive pills: Patient has been using Oral contraceptive pills since 2016. Patient now has more cramping than prior to her delivery, but feels she does well with use. No contraindications to use, including blood clots, bleeding disorders, migraine's, tobacco use.    - PSHx:   C section    - FHx:    - Mother: alive, mental health issues    - Father: alive, diabetes type 1 diagnosis age 3-4, mental health issues     - Maternal grandmother:  age 87 , G.I. bleed, stroke, mental health issues, Osteoarthritis     - Maternal grandfather: , unknown issues     - Paternal grandmother: , smoker, lung disease    - Paternal grandfather: , smoker, lung disease    - Siblings: sister - mental health, depression     - Children: 1 daughter - 14 months - healthy   - Soc: Lives with fiance and daughter; feels safe. Completed college. Works as PSR; CNA previously. Denies history of violence or abuse.     2. Annual  Diet: Getting better  Calcium: Good  Exercise: daily, 10-15 minutes daily  Alcohol: 1-2 drinks every couple of months   Tobacco/Illicit drug Use:  Denies   Relationships: Feels safe, fiance  Immunizations: Up To Date   Hearing:  Good  Vision: Good  Smoke Detectors: Yes  Carbon Monoxide Detectors: Yes  Seatbelt: Yes  Dentist: Needs appointment, yearly visits  Sunscreen use: Yes     Other Concerns:  Urticaria: previously with exercise Patient would break out in hives. No current issues.   Rash to arm: flexor area of right  elbow, dry patches to bilateral upper extremities. Noticed it after delivery of child 13 months ago. Recently noted dry spots about 1 month ago. Denies area being itchy, painful, and no fluid collection. Patient tried topical antibiotic once but no improvement really. Denies changes of home products, new foods or any other changes.       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       MEDICAL HISTORY    Past Medical History:   Diagnosis Date   • Depression        FAMILY HISTORY    Family History   Problem Relation Age of Onset   • Diabetes Father    • Psychiatric Father    • Depression Mother    • Psychiatric Mother    • Depression Sister    • Psychiatric Sister    • Patient is unaware of any medical problems Daughter    • Stroke Maternal Grandmother    • Psychiatric Maternal Grandmother    • Patient is unaware of any medical problems Maternal Grandfather    • Lung Disease Paternal Grandmother         smoker   • Lung Disease Paternal Grandfather         smoker   • Clotting Disorder Neg Hx    • Myocardial Infarction Neg Hx    • Cancer, Breast Neg Hx    • Cancer, Colon Neg Hx        SOCIAL HISTORY    Social History     Tobacco Use   • Smoking status: Never Smoker   • Smokeless tobacco: Never Used   Substance Use Topics   • Alcohol use: No     Frequency: Never     Drinks per session: 1 or 2     Binge frequency: Never   • Drug use: No       SURGICAL HISTORY    Past Surgical History:   Procedure Laterality Date   •  section, low transverse  2018       CURRENT MEDICATIONS    Current Outpatient Medications   Medication Sig Dispense Refill   • Norgestimate-Ethinyl Estradiol 0.18/0.215/0.25 MG-25 MCG tablet Take 1 tablet by mouth daily. 84 tablet 4     No current facility-administered medications for this visit.        ALLERGIES    ALLERGIES:  No Known  Allergies    REVIEW OF SYSTEMS    GENERAL:  Denies fever, chills, tiredness, malaise, or unintentional weight changes.  HEENT:  Denies eye drainage, rhinorrhea, ear pain, or sore throat.  NEUROLOGIC:  Denies tremors, dizzy spells, numbness, or tingling.  ENDOCRINE:  Denies excessive thirst, heat intolerance, or tired/sluggishness.  CARDIOVASCULAR:  Denies chest pain, shortness of breath, or palpitations.  RESPIRATORY:  Denies wheezing, frequent cough, or shortness of breath.  GASTROINTESTINAL:  Denies abdominal pain, nausea/vomiting, indigestion/heartburn, constipation, or diarrhea.  :  Denies urine retention, painful urination, urinary frequency, or blood in urine.  SKIN:  + skin rash  MUSCULOSKELETAL:  Denies joint pain, neck pain, or back pain.  All other review of symptoms negative     PHYSICAL EXAM    Visit Vitals  /82 (BP Location: RUE - Right upper extremity, Patient Position: Sitting, Cuff Size: Regular)   Pulse 90   Temp 98.2 °F (36.8 °C) (Oral)   Ht 5' 6\" (1.676 m)   Wt 73.1 kg   LMP 01/13/2020   SpO2 99%   BMI 25.99 kg/m²     GENERAL:  Alert, no acute distress, appropriately dressed.  Head:  Normocephalic, atraumatic  Eyes:  Pupils equal, round, reactive to light and accommodation.  Extraocular movements intact.  Anicteric sclerae.  ENT:  Moist buccal mucosa, no oropharyngeal exudate or posterior pharynx erythema.  Right and left tympanic membrane and canal normal.  Pinna normal appearing bilaterally.  NECK:  Supple, no cervical lymphadenopathy.  No masses upon palpation.   CHEST/LUNG:  No respiratory distress, good air flow to bases, clear to auscultation bilaterally.  No wheezes/rales/rhonchi.   CARDIOVASCULAR:  Regular rate and rhythm, S1, S2 normal; no murmurs.  ABDOMEN:  Soft, + bowel sounds, nontender, no rebound or guarding.  MUSCULOSKELETAL:  Moves all extremities well.  Strength grossly normal in bilateral upper and lower extremities.   EXTREMITIES:  No edema or cyanosis.  SKIN:  Warm,  dry. Small area of erythema, plaque like over right upper extremity, noted dry patches with scaling  NEURO:  Gait normal, coordination grossly intact, CN II-XII grossly intact.  PSYCH:  Mood and affect appropriate.    LAB RESULTS:    Lab Results   Component Value Date    WBC 10.4 11/18/2018    RBC 3.46 (L) 11/18/2018    HCT 31.6 (L) 11/18/2018    HGB 10.6 (L) 11/18/2018     (L) 11/18/2018     Lab Results   Component Value Date    COL YELLOW 09/28/2018    UAPP CLOUDY 09/28/2018    USPG 1.015 09/28/2018    UPH 8.5 (H) 09/28/2018    UPROT TRACE (A) 09/28/2018    UGLU NEGATIVE 09/28/2018    UKET NEGATIVE 09/28/2018    UBILI NEGATIVE 09/28/2018    URBC NEGATIVE 09/28/2018    UNITR NEGATIVE 09/28/2018    UROB 0.2 09/28/2018    UWBC MODERATE (A) 09/28/2018     TSH (mcUnits/mL)   Date Value   04/30/2018 2.129        ASSESSMENT & PLAN    Diagnoses and all orders for this visit:    Encounter to establish care with new doctor : reviewed past medical history and updated.     Annual physical exam  Anticipatory guidance provided (SEE After visit summary and note).  Immunizations: Up To Date  Pap Smear: up to date  Colonoscopy: Not indicated at this time.  Mammogram: Not indicated.  Labs: None  Follow-Up: Annually or sooner as needed for illness/concerns.  Body mass index is 25.99 kg/m².: Patient is overweight.    30minutes of physical activity a day     Surveillance of previously prescribed contraceptive pill  Discussed risks/benefits. Patient wishes to continue therapy. No contraindications as stated above. Refilled Oral contraceptive pills    Rash  Discussed likely eczema. Encourage emollient daily. Watch for triggers. Patient may try over-the-counter topical steroids. If no improvement, alert provider. May consider Rx topical steroid, further evaluation.     Other orders  -     Norgestimate-Ethinyl Estradiol 0.18/0.215/0.25 MG-25 MCG tablet; Take 1 tablet by mouth daily.    Return in about 1 year (around 1/30/2021) for  annual.    Sherri Harmon MD       23-Nov-2024 00:00

## 2024-11-23 NOTE — OB RN INTRAOPERATIVE NOTE - NSSELHIDDEN_OBGYN_ALL_OB_FT
[NS_[email or web address]_ALL_OB_FT:[id number]],[NS_DeliveryAssist1_OBGYN_ALL_OB_FT:[id number]=],[NS_DeliveryRN_OBGYN_ALL_OB_FT:[id number]]

## 2024-11-23 NOTE — BRIEF OPERATIVE NOTE - NSICDXBRIEFPREOP_GEN_ALL_CORE_FT
PRE-OP DIAGNOSIS:  History of  section 2024 01:16:07  Justin Powell  39 weeks gestation of pregnancy 2024 01:16:16  Justin Powell

## 2024-11-23 NOTE — CHART NOTE - NSCHARTNOTEFT_GEN_A_CORE
Called at request of Dr. Barreto to attend Csection in setting of preeclampsia.   vigorous at birth.  [place] with strong, spontaneous cry, and displaying adequate color and tone.  Delayed cord clamping performed.  [place] brought to warmer, dried, and hat placed on head.  Bulb suction to mouth and nose for retained amnitoic fluid.   well appearing.  No further intevention needed.  Will be admitted to [facility]

## 2024-11-23 NOTE — OB RN DELIVERY SUMMARY - NSSELHIDDEN_OBGYN_ALL_OB_FT
[NS_DeliveryAttending1_OBGYN_ALL_OB_FT:[id number]],[NS_DeliveryAssist1_OBGYN_ALL_OB_FT:[id number]=],[NS_DeliveryRN_OBGYN_ALL_OB_FT:[id number]=]

## 2024-11-23 NOTE — OB RN DELIVERY SUMMARY - NS_BABYDISPOA_OBGYN_ALL_OB
Outreach attempt was made to schedule a Medicare Wellness Visit. This was the second attempt. Contact was not made, left message. 2nd letter    
Mother's Bedside/Non-

## 2024-11-23 NOTE — PROGRESS NOTE ADULT - SUBJECTIVE AND OBJECTIVE BOX
PGY2 note  Chief Complaint: Post  section    Patient seen and examined. Pain well controlled at this time. No complaints at this time. Denies fever, chills, nausea, vomiting, chest pain, shortness of breath, severe abdominal pain, heavy vaginal bleeding. Patient has not passed flatus, is tolerating PO, nuñez catheter in place. Nuñez output has been slow, likely due to dehydration. Bolus administered. Pt denies headache, scotomata, and RUQ pain. She desires to breast and bottle feed.   PPH: denies dizziness, dyspnea, chest palpitation    PAST MEDICAL & SURGICAL HISTORY:  Obesity  S/P dilation and curettage      MEDICATIONS  (STANDING):  acetaminophen     Tablet .. 975 milliGRAM(s) Oral every 6 hours  acetaminophen   IVPB .. 1000 milliGRAM(s) IV Intermittent every 6 hours  diphtheria/tetanus/pertussis (acellular) Vaccine (Adacel) 0.5 milliLiter(s) IntraMuscular once  enoxaparin Injectable 40 milliGRAM(s) SubCutaneous every 24 hours  lactated ringers. 1000 milliLiter(s) (125 mL/Hr) IV Continuous <Continuous>  oxytocin Infusion 42 milliUNIT(s)/Min (42 mL/Hr) IV Continuous <Continuous>  prenatal multivitamin 1 Tablet(s) Oral daily  simethicone 80 milliGRAM(s) Chew every 4 hours    MEDICATIONS  (PRN):  diphenhydrAMINE 25 milliGRAM(s) Oral every 6 hours PRN Pruritus  lanolin Ointment 1 Application(s) Topical every 6 hours PRN Sore Nipples  magnesium hydroxide Suspension 30 milliLiter(s) Oral two times a day PRN Constipation  oxyCODONE    IR 5 milliGRAM(s) Oral once PRN Moderate to Severe Pain (4-10)  oxyCODONE    IR 5 milliGRAM(s) Oral every 3 hours PRN Moderate to Severe Pain (4-10)      Physical Exam  Vital Signs Last 24 Hrs  T(F): 98.2 (2024 22:17), Max: 98.2 (2024 22:17)  HR: 74 (2024 04:52) (60 - 89)  BP: 122/74 (2024 04:49) (89/57 - 140/76)  RR: 17 (2024 22:17) (17 - 17)    Physical exam:  General - AAOx3, NAD  Heart - S1S2, RRR  Lungs - CTA BL  Abdomen:  - Soft, appropriately tender, mildly distended. Clean and dry abdominal pad placed over pfannenstiel skin incision.  - Fundus firm, appropriately tender, below the umbilicus  - No rebound or guarding  Pelvis/Vagina - Normal Lochia  Extremities - No calf tenderness, no swelling    Labs:                        9.3    8.82  )-----------( 316      ( 2024 22:07 )             30.3     Antibody Screen: NEG (24 @ 22:06)    Prenatal Syphilis Test: Nonreact (24 @ 22:07)    Antibody Screen: NEG (24 @ 22:06)      A/P   Antibody Screen: NEG (24 @ 22:06)    s/p  section # , POD #1, stable  -  PGY2 note  Chief Complaint: Post  section    Patient seen and examined. Pain well controlled at this time. No complaints at this time. Denies fever, chills, nausea, vomiting, chest pain, shortness of breath, severe abdominal pain, heavy vaginal bleeding. Patient has not passed flatus, is tolerating PO, nuñez catheter in place. Nuñez output has been slow, likely due to dehydration. Bolus administered. Pt denies headache, scotomata, and RUQ pain. She desires to breast and bottle feed.   PPH: denies dizziness, dyspnea, chest palpitation    PAST MEDICAL & SURGICAL HISTORY:  Obesity  S/P dilation and curettage      MEDICATIONS  (STANDING):  acetaminophen     Tablet .. 975 milliGRAM(s) Oral every 6 hours  acetaminophen   IVPB .. 1000 milliGRAM(s) IV Intermittent every 6 hours  diphtheria/tetanus/pertussis (acellular) Vaccine (Adacel) 0.5 milliLiter(s) IntraMuscular once  enoxaparin Injectable 40 milliGRAM(s) SubCutaneous every 24 hours  lactated ringers. 1000 milliLiter(s) (125 mL/Hr) IV Continuous <Continuous>  oxytocin Infusion 42 milliUNIT(s)/Min (42 mL/Hr) IV Continuous <Continuous>  prenatal multivitamin 1 Tablet(s) Oral daily  simethicone 80 milliGRAM(s) Chew every 4 hours    MEDICATIONS  (PRN):  diphenhydrAMINE 25 milliGRAM(s) Oral every 6 hours PRN Pruritus  lanolin Ointment 1 Application(s) Topical every 6 hours PRN Sore Nipples  magnesium hydroxide Suspension 30 milliLiter(s) Oral two times a day PRN Constipation  oxyCODONE    IR 5 milliGRAM(s) Oral once PRN Moderate to Severe Pain (4-10)  oxyCODONE    IR 5 milliGRAM(s) Oral every 3 hours PRN Moderate to Severe Pain (4-10)      Physical Exam  Vital Signs Last 24 Hrs  T(F): 98.2 (2024 22:17), Max: 98.2 (2024 22:17)  HR: 74 (2024 04:52) (60 - 89)  BP: 122/74 (2024 04:49) (89/57 - 140/76)  RR: 17 (2024 22:17) (17 - 17)    Physical exam:  General - AAOx3, NAD  Heart - S1S2, RRR  Lungs - CTA BL  Abdomen:  - Soft, appropriately tender, mildly distended. Clean and dry abdominal pad placed over pfannenstiel skin incision.  - Fundus firm, appropriately tender, below the umbilicus  - No rebound or guarding  Pelvis/Vagina - Normal Lochia  Extremities - No calf tenderness, no swelling    Labs:                        9.3    8.82  )-----------( 316      ( 2024 22:07 )             30.3     Antibody Screen: NEG (24 @ 22:06)    Prenatal Syphilis Test: Nonreact (24 @ 22:07)    Antibody Screen: NEG (24 @ 22:06)      A/P   Antibody Screen: NEG (24 @ 22:06)

## 2024-11-23 NOTE — PROCEDURE NOTE - ADDITIONAL PROCEDURE DETAILS
Thorough discussion of patient's history, as indicated above.  Discussed risks of spinal/epidural, including PDPH, inadequate analgesia occasionally requiring epidural catheter replacement/ general anesthesia, bleeding, infection and spinal cord injury. hypotension and nausea. Patient expressed understanding of these risks, signed informed consent and wishes to proceed with spinal/epidural    Patient sitting. Lumbar epidural performed at L2-3 Standard ASA monitors including FHR. Sterile gloves, Chloro-prep. 1% lidocaine for local infiltration. 17g Touhy. RAE with air at 8 cm. 27g Nader used to make a dural puncture with + CSF. Nader needle removed and epidural catheter threaded easily. Touhy needle removed. Catheter secured in place at 12.5  cm. Negative aspiration. Test dose consisting of 3ml 1.5% lidocaine with epinephrine 1:200k was negative. 10cc .125% Bupivacaine in two divided doses of 5cc. Patient tolerated procedure and was hemodynamically stable throughout. T10 level bilaterally.     Patient hx notable for prior  in setting of severe preeclampsia. Pre-op evaluation occurred at 1030PM. Patient had heavy meal around 0830PM. Ob team said they wanted to perform  section as early as possible. Relayed to them concern over aspiration risk given insufficient NPO time. Plan made to place epidural as soon as possible and wait as long as safely possible from obstetrician's perspective to allow for adequate gastric emptying. Performed epidural (DPE) and procedure finished at 2300. Around 2310, member of obstetric team expressed they would like  section to be performed as soon as possible as patient is already in labor and they did not want to delay "for too long" given concern for possible complications such as uterine rupture. I expressed that patient remains at high risk for aspiration but would be ok with providing anesthesia for the case if obstetricians deemed it emergently necessary. Patient taken to the OR for  at 2335, at which time epidural was incrementally loaded with lido 2% with epi. T4 level achieved and case proceeded.

## 2024-11-23 NOTE — CHART NOTE - NSCHARTNOTEFT_GEN_A_CORE
PACU ANESTHESIA ADMISSION NOTE      Procedure: Repeat  section      Post op diagnosis:  39 weeks gestation of pregnancy        ____  Intubated  TV:______       Rate: ______      FiO2: ______    __x__  Patent Airway    __x__  Full return of protective reflexes    __x__  Full recovery from anesthesia / back to baseline status    Vitals:  T(C): 36.8 (24 @ 22:17), Max: 36.8 (24 @ 22:17)  HR: 69 (24 @ 01:42) (68 - 89)  BP: 115/71 (24 @ 01:35) (89/57 - 140/76)  RR: 17 (24 @ 22:17) (17 - 17)  SpO2: 100% (24 @ 01:42) (92% - 100%)    Mental Status:  __x__ Awake   ___x__ Alert   _____ Drowsy   _____ Sedated    Nausea/Vomiting:  __x__ NO  ______Yes,   See Post - Op Orders          Pain Scale (0-10):  _____    Treatment: ____ None    __x__ See Post - Op/PCA Orders    Post - Operative Fluids:   ____ Oral   __x__ See Post - Op Orders    Plan: Discharge:   ____Home       _____Floor     _____Critical Care    _____  Other:_________________    Comments: Patient had smooth intraoperative event, no anesthesia complication.  PACU Vital signs: HR:     80       BP:    125    /  80        RR:   12          O2 Sat:    100   %     Temp 98F

## 2024-11-23 NOTE — OB RN DELIVERY SUMMARY - NS_SEPSISRSKCALC_OBGYN_ALL_OB_FT
EOS calculated successfully. EOS Risk Factor: 0.5/1000 live births ([facility] national incidence); GA=39w3d; Temp=98.2; ROM=0; GBS='Negative'; Antibiotics='No antibiotics or any antibiotics < 2 hrs prior to birth'

## 2024-11-24 LAB
BASOPHILS # BLD AUTO: 0.03 K/UL — SIGNIFICANT CHANGE UP (ref 0–0.2)
BASOPHILS NFR BLD AUTO: 0.3 % — SIGNIFICANT CHANGE UP (ref 0–1)
EOSINOPHIL # BLD AUTO: 0.07 K/UL — SIGNIFICANT CHANGE UP (ref 0–0.7)
EOSINOPHIL NFR BLD AUTO: 0.8 % — SIGNIFICANT CHANGE UP (ref 0–8)
HCT VFR BLD CALC: 24.7 % — LOW (ref 37–47)
HGB BLD-MCNC: 7.5 G/DL — LOW (ref 12–16)
IMM GRANULOCYTES NFR BLD AUTO: 0.6 % — HIGH (ref 0.1–0.3)
LYMPHOCYTES # BLD AUTO: 3.05 K/UL — SIGNIFICANT CHANGE UP (ref 1.2–3.4)
LYMPHOCYTES # BLD AUTO: 32.9 % — SIGNIFICANT CHANGE UP (ref 20.5–51.1)
MCHC RBC-ENTMCNC: 22.9 PG — LOW (ref 27–31)
MCHC RBC-ENTMCNC: 30.4 G/DL — LOW (ref 32–37)
MCV RBC AUTO: 75.3 FL — LOW (ref 81–99)
MONOCYTES # BLD AUTO: 0.61 K/UL — HIGH (ref 0.1–0.6)
MONOCYTES NFR BLD AUTO: 6.6 % — SIGNIFICANT CHANGE UP (ref 1.7–9.3)
NEUTROPHILS # BLD AUTO: 5.44 K/UL — SIGNIFICANT CHANGE UP (ref 1.4–6.5)
NEUTROPHILS NFR BLD AUTO: 58.8 % — SIGNIFICANT CHANGE UP (ref 42.2–75.2)
NRBC # BLD: 0 /100 WBCS — SIGNIFICANT CHANGE UP (ref 0–0)
PLATELET # BLD AUTO: 242 K/UL — SIGNIFICANT CHANGE UP (ref 130–400)
PMV BLD: 10.9 FL — HIGH (ref 7.4–10.4)
RBC # BLD: 3.28 M/UL — LOW (ref 4.2–5.4)
RBC # FLD: 13.5 % — SIGNIFICANT CHANGE UP (ref 11.5–14.5)
WBC # BLD: 9.26 K/UL — SIGNIFICANT CHANGE UP (ref 4.8–10.8)
WBC # FLD AUTO: 9.26 K/UL — SIGNIFICANT CHANGE UP (ref 4.8–10.8)

## 2024-11-24 PROCEDURE — 99231 SBSQ HOSP IP/OBS SF/LOW 25: CPT

## 2024-11-24 RX ORDER — OXYCODONE HYDROCHLORIDE 30 MG/1
5 TABLET ORAL ONCE
Refills: 0 | Status: DISCONTINUED | OUTPATIENT
Start: 2024-11-24 | End: 2024-11-25

## 2024-11-24 RX ORDER — OXYCODONE HYDROCHLORIDE 30 MG/1
5 TABLET ORAL
Refills: 0 | Status: DISCONTINUED | OUTPATIENT
Start: 2024-11-24 | End: 2024-11-25

## 2024-11-24 RX ADMIN — ACETAMINOPHEN 500MG 975 MILLIGRAM(S): 500 TABLET, COATED ORAL at 10:16

## 2024-11-24 RX ADMIN — Medication 80 MILLIGRAM(S): at 17:02

## 2024-11-24 RX ADMIN — .BETA.-CAROTENE, SODIUM ACETATE, ASCORBIC ACID, CHOLECALCIFEROL, .ALPHA.-TOCOPHEROL ACETATE, DL-, THIAMINE MONONITRATE, RIBOFLAVIN, NIACINAMIDE, PYRIDOXINE HYDROCHLORIDE, FOLIC ACID, CYANOCOBALAMIN, CALCIUM CARBONATE, FERROUS FUMARATE, ZINC OXIDE AND CUPRIC OXIDE 1 TABLET(S): 2000; 2000; 120; 400; 22; 1.84; 3; 20; 10; 1; 12; 200; 27; 25; 2 TABLET ORAL at 12:05

## 2024-11-24 RX ADMIN — ACETAMINOPHEN 500MG 975 MILLIGRAM(S): 500 TABLET, COATED ORAL at 07:48

## 2024-11-24 RX ADMIN — Medication 30 MILLILITER(S): at 01:33

## 2024-11-24 RX ADMIN — OXYCODONE HYDROCHLORIDE 5 MILLIGRAM(S): 30 TABLET ORAL at 23:31

## 2024-11-24 RX ADMIN — Medication 600 MILLIGRAM(S): at 12:05

## 2024-11-24 RX ADMIN — Medication 80 MILLIGRAM(S): at 10:16

## 2024-11-24 RX ADMIN — ACETAMINOPHEN 500MG 975 MILLIGRAM(S): 500 TABLET, COATED ORAL at 15:39

## 2024-11-24 RX ADMIN — Medication 600 MILLIGRAM(S): at 23:48

## 2024-11-24 RX ADMIN — Medication 30 MILLILITER(S): at 20:44

## 2024-11-24 RX ADMIN — Medication 80 MILLIGRAM(S): at 06:16

## 2024-11-24 RX ADMIN — ACETAMINOPHEN 500MG 975 MILLIGRAM(S): 500 TABLET, COATED ORAL at 11:00

## 2024-11-24 RX ADMIN — ACETAMINOPHEN 500MG 975 MILLIGRAM(S): 500 TABLET, COATED ORAL at 20:44

## 2024-11-24 RX ADMIN — ACETAMINOPHEN 500MG 975 MILLIGRAM(S): 500 TABLET, COATED ORAL at 02:44

## 2024-11-24 RX ADMIN — Medication 80 MILLIGRAM(S): at 22:33

## 2024-11-24 RX ADMIN — Medication 80 MILLIGRAM(S): at 15:39

## 2024-11-24 RX ADMIN — OXYCODONE HYDROCHLORIDE 5 MILLIGRAM(S): 30 TABLET ORAL at 22:31

## 2024-11-24 RX ADMIN — Medication 600 MILLIGRAM(S): at 12:53

## 2024-11-24 RX ADMIN — ACETAMINOPHEN 500MG 975 MILLIGRAM(S): 500 TABLET, COATED ORAL at 16:23

## 2024-11-24 RX ADMIN — Medication 80 MILLIGRAM(S): at 02:44

## 2024-11-24 RX ADMIN — Medication 600 MILLIGRAM(S): at 17:01

## 2024-11-24 RX ADMIN — ENOXAPARIN SODIUM 40 MILLIGRAM(S): 30 INJECTION SUBCUTANEOUS at 15:38

## 2024-11-24 RX ADMIN — ACETAMINOPHEN 500MG 975 MILLIGRAM(S): 500 TABLET, COATED ORAL at 21:44

## 2024-11-24 NOTE — PROGRESS NOTE ADULT - SUBJECTIVE AND OBJECTIVE BOX
PGY1 note  Chief Complaint: Post  section    Patient seen and examined. Pain well controlled at this time. No complaints at this time. Denies fever, chills, nausea, vomiting, chest pain, shortness of breath, severe abdominal pain, heavy vaginal bleeding. Patient is ambulating, passing flatus.   Diet: Regular, tolerating PO  Voiding: Voiding without difficulty, no dysuria   Denies palpitations, SOB, dizziness, syncope.     PAST MEDICAL & SURGICAL HISTORY:  Obesity      S/P dilation and curettage          MEDICATIONS  (STANDING):  acetaminophen     Tablet .. 975 milliGRAM(s) Oral <User Schedule>  diphtheria/tetanus/pertussis (acellular) Vaccine (Adacel) 0.5 milliLiter(s) IntraMuscular once  enoxaparin Injectable 40 milliGRAM(s) SubCutaneous every 24 hours  ibuprofen  Tablet. 600 milliGRAM(s) Oral every 6 hours  lactated ringers. 1000 milliLiter(s) (125 mL/Hr) IV Continuous <Continuous>  lactated ringers. 1000 milliLiter(s) (125 mL/Hr) IV Continuous <Continuous>  oxytocin Infusion 42 milliUNIT(s)/Min (42 mL/Hr) IV Continuous <Continuous>  prenatal multivitamin 1 Tablet(s) Oral daily  simethicone 80 milliGRAM(s) Chew every 4 hours    MEDICATIONS  (PRN):  diphenhydrAMINE 25 milliGRAM(s) Oral every 6 hours PRN Pruritus  lanolin Ointment 1 Application(s) Topical every 6 hours PRN Sore Nipples  magnesium hydroxide Suspension 30 milliLiter(s) Oral two times a day PRN Constipation  oxyCODONE    IR 5 milliGRAM(s) Oral every 3 hours PRN Moderate to Severe Pain (4-10)  oxyCODONE    IR 5 milliGRAM(s) Oral once PRN Moderate to Severe Pain (4-10)      Physical Exam  Vital Signs Last 24 Hrs  T(F): 97.9 (2024 03:29), Max: 98.6 (2024 15:02)  HR: 55 (2024 03:29) (55 - 66)  BP: 119/77 (2024 03:29) (109/71 - 139/84)  RR: 18 (2024 03:29) (18 - 18)    Physical exam:  General - AAOx3, NAD  Heart - S1S2, RRR  Lungs - CTA BL  Abdomen:  - Soft, appropriately tender, mildly distended, BS+. Clean, dry, intact steri strips in place over pfannenstiel skin incision.  - Fundus firm, appropriately tender, below the umbilicus  - No rebound or guarding  Pelvis/Vagina - Normal Lochia  Extremities - No calf tenderness, no swelling    Labs:                        7.5    9.26  )-----------( 242      ( 2024 06:02 )             24.7                         7.1    11.77 )-----------( 207      ( 2024 16:54 )             23.1     Antibody Screen: NEG (24 @ 22:06)      Antibody Screen: NEG (24 @ 22:06)      A/P   Antibody Screen: NEG (24 @ 22:06)

## 2024-11-25 ENCOUNTER — TRANSCRIPTION ENCOUNTER (OUTPATIENT)
Age: 29
End: 2024-11-25

## 2024-11-25 VITALS
RESPIRATION RATE: 18 BRPM | DIASTOLIC BLOOD PRESSURE: 83 MMHG | HEART RATE: 62 BPM | SYSTOLIC BLOOD PRESSURE: 150 MMHG | TEMPERATURE: 98 F | OXYGEN SATURATION: 100 %

## 2024-11-25 PROCEDURE — 99238 HOSP IP/OBS DSCHRG MGMT 30/<: CPT | Mod: 24

## 2024-11-25 RX ORDER — IBUPROFEN 200 MG
1 TABLET ORAL
Qty: 56 | Refills: 0
Start: 2024-11-25 | End: 2024-12-08

## 2024-11-25 RX ORDER — ACETAMINOPHEN 500MG 500 MG/1
2 TABLET, COATED ORAL
Qty: 112 | Refills: 0
Start: 2024-11-25 | End: 2024-12-08

## 2024-11-25 RX ORDER — SIMETHICONE 125 MG
1 CAPSULE ORAL
Qty: 0 | Refills: 0 | DISCHARGE
Start: 2024-11-25

## 2024-11-25 RX ORDER — OXYCODONE HYDROCHLORIDE 30 MG/1
1 TABLET ORAL
Qty: 4 | Refills: 0
Start: 2024-11-25 | End: 2024-11-28

## 2024-11-25 RX ORDER — .BETA.-CAROTENE, SODIUM ACETATE, ASCORBIC ACID, CHOLECALCIFEROL, .ALPHA.-TOCOPHEROL ACETATE, DL-, THIAMINE MONONITRATE, RIBOFLAVIN, NIACINAMIDE, PYRIDOXINE HYDROCHLORIDE, FOLIC ACID, CYANOCOBALAMIN, CALCIUM CARBONATE, FERROUS FUMARATE, ZINC OXIDE AND CUPRIC OXIDE 2000; 2000; 120; 400; 22; 1.84; 3; 20; 10; 1; 12; 200; 27; 25; 2 [IU]/1; [IU]/1; MG/1; [IU]/1; MG/1; MG/1; MG/1; MG/1; MG/1; MG/1; UG/1; MG/1; MG/1; MG/1; MG/1
1 TABLET ORAL
Qty: 0 | Refills: 0 | DISCHARGE
Start: 2024-11-25

## 2024-11-25 RX ORDER — OXYCODONE HYDROCHLORIDE 30 MG/1
1 TABLET ORAL
Qty: 4 | Refills: 0
Start: 2024-11-25

## 2024-11-25 RX ORDER — NIFEDIPINE 10 MG
1 CAPSULE ORAL
Qty: 60 | Refills: 0
Start: 2024-11-25 | End: 2025-01-23

## 2024-11-25 RX ADMIN — Medication 600 MILLIGRAM(S): at 05:49

## 2024-11-25 RX ADMIN — Medication 80 MILLIGRAM(S): at 10:00

## 2024-11-25 RX ADMIN — .BETA.-CAROTENE, SODIUM ACETATE, ASCORBIC ACID, CHOLECALCIFEROL, .ALPHA.-TOCOPHEROL ACETATE, DL-, THIAMINE MONONITRATE, RIBOFLAVIN, NIACINAMIDE, PYRIDOXINE HYDROCHLORIDE, FOLIC ACID, CYANOCOBALAMIN, CALCIUM CARBONATE, FERROUS FUMARATE, ZINC OXIDE AND CUPRIC OXIDE 1 TABLET(S): 2000; 2000; 120; 400; 22; 1.84; 3; 20; 10; 1; 12; 200; 27; 25; 2 TABLET ORAL at 12:46

## 2024-11-25 RX ADMIN — Medication 600 MILLIGRAM(S): at 00:48

## 2024-11-25 RX ADMIN — ACETAMINOPHEN 500MG 975 MILLIGRAM(S): 500 TABLET, COATED ORAL at 10:29

## 2024-11-25 RX ADMIN — ACETAMINOPHEN 500MG 975 MILLIGRAM(S): 500 TABLET, COATED ORAL at 09:59

## 2024-11-25 RX ADMIN — Medication 600 MILLIGRAM(S): at 06:49

## 2024-11-25 RX ADMIN — ACETAMINOPHEN 500MG 975 MILLIGRAM(S): 500 TABLET, COATED ORAL at 02:54

## 2024-11-25 RX ADMIN — Medication 600 MILLIGRAM(S): at 12:47

## 2024-11-25 RX ADMIN — Medication 80 MILLIGRAM(S): at 05:49

## 2024-11-25 RX ADMIN — OXYCODONE HYDROCHLORIDE 5 MILLIGRAM(S): 30 TABLET ORAL at 05:51

## 2024-11-25 RX ADMIN — ACETAMINOPHEN 500MG 975 MILLIGRAM(S): 500 TABLET, COATED ORAL at 03:54

## 2024-11-25 RX ADMIN — Medication 80 MILLIGRAM(S): at 02:57

## 2024-11-25 NOTE — DISCHARGE NOTE OB - FINANCIAL ASSISTANCE
[facility] provides services at a reduced cost to those who are determined to be eligible through [facility]’s financial assistance program. Information regarding [facility]’s financial assistance program can be found by going to [email or web address] or by calling [phone].

## 2024-11-25 NOTE — DISCHARGE NOTE OB - PLAN OF CARE
Nothing in the vagina for 6 weeks (no sex, no tampons, no douching). Avoid tub baths, you may shower. Avoid heavy lifting (nothing more than 10lb) for the first 4 weeks after surgery.  If you have a fever of 100.4F or greater, severe vaginal bleeding, or severe abdominal pain, call your Ob/Gyn or come to the emergency department immediately.      FOLLOW UP  - Follow up with your OB/GYN provider in 1 week for an incision check and 6 weeks for your routine post partum visit    PAIN MANAGEMENT:   Alternate Acetaminophen/Tylenol and Ibuprofen/Motrin (if you are eligible). Each of these medications can be taken every six hours. Try to stagger them so that you are taking something for pain every three hours (ex. Take Motrin at 12:00, Tylenol at 3:00, Motrin at 6:00, etc.) to maximize pain relief.  o Dosages       - Tylenol – 500-650 mg every 6 hours as needed       - Motrin/Ibuprofen - 600 mg every 6 hours as needed  o The maximum dose of Tylenol is 4000 mg in 24 hours, the maximum dose of Motrin/ibuprofen is 2400mg in 24 hours  A warm shower or heating pad may also help.    Some patient may recieve a presciption for oxycodone. If you are one of those patients, follow the below instructions  -Oxycodone is a narcotic, therefore it can be highly addictive and excessive or prolonged use should be avoided.  -Use for SEVERE PAIN only. You should continue to take Tylenol and Motrin and only supplement with oxycodone as needed for breakthrough pain.  -The maximum daily dose is 4 pills in a 24 hour period.  -You should not take oxycodone for more than 2-3 days after surgery. If you continue to have severe pain after this time requiring narcotic pain medicine, call your doctor. If you have a severe headache, blurry vision, chest pain, shortness of breath, severe abdominal pain or body swelling, please call your doctor or come to the emergency room. If the top number of your blood pressure is >160 and/or the bottom number of your blood pressure is >110, then call your doctor or come to the emergency department.

## 2024-11-25 NOTE — DISCHARGE NOTE OB - MEDICATION SUMMARY - MEDICATIONS TO TAKE
I will START or STAY ON the medications listed below when I get home from the hospital:    ibuprofen 600 mg oral tablet  -- 1 tab(s) by mouth every 6 hours  -- Indication: For pain    acetaminophen 325 mg oral tablet  -- 2 tab(s) by mouth every 6 hours as needed for , Moderate Pain (4 - 6), Severe Pain (7 - 10)  -- Indication: For pain    NIFEdipine 60 mg oral tablet, extended release  -- 1 tab(s) by mouth every 24 hours  -- Indication: For hypertension    Prenatal Multivitamins with Folic Acid 1 mg oral tablet  -- 1 tab(s) by mouth once a day  -- Indication: For postpartum    senna leaf extract oral tablet  -- 2 tab(s) by mouth once a day (at bedtime)  -- Indication: For constipation    simethicone 80 mg oral tablet, chewable  -- 1 tab(s) by mouth every 4 hours  -- Indication: For gas   I will START or STAY ON the medications listed below when I get home from the hospital:    ibuprofen 600 mg oral tablet  -- 1 tab(s) by mouth every 6 hours  -- Indication: For pain    acetaminophen 325 mg oral tablet  -- 2 tab(s) by mouth every 6 hours as needed for , Moderate Pain (4 - 6), Severe Pain (7 - 10)  -- Indication: For pain    Prenatal Multivitamins with Folic Acid 1 mg oral tablet  -- 1 tab(s) by mouth once a day  -- Indication: For postpartum    senna leaf extract oral tablet  -- 2 tab(s) by mouth once a day (at bedtime)  -- Indication: For constipation    simethicone 80 mg oral tablet, chewable  -- 1 tab(s) by mouth every 4 hours  -- Indication: For gas   I will START or STAY ON the medications listed below when I get home from the hospital:    ibuprofen 600 mg oral tablet  -- 1 tab(s) by mouth every 6 hours  -- Indication: For pain    acetaminophen 325 mg oral tablet  -- 2 tab(s) by mouth every 6 hours as needed for , Moderate Pain (4 - 6), Severe Pain (7 - 10)  -- Indication: For pain    oxyCODONE 5 mg oral tablet  -- 1 tab(s) by mouth once a day MDD: 1 tablet per day  -- Indication: For intense pain    Prenatal Multivitamins with Folic Acid 1 mg oral tablet  -- 1 tab(s) by mouth once a day  -- Indication: For postpartum    senna leaf extract oral tablet  -- 2 tab(s) by mouth once a day (at bedtime)  -- Indication: For constipation    simethicone 80 mg oral tablet, chewable  -- 1 tab(s) by mouth every 4 hours  -- Indication: For gas

## 2024-11-25 NOTE — PROGRESS NOTE ADULT - ASSESSMENT
30yo now P2  s/p Low transverse  section #2, POD #1, hx of preeclampsia, obesity, stable    #Post  Section  -  Pain control per routine  -  encourage ambulation  -  encourage incentive spirometry  -  PO hydration  -  Continue diet as tolerated   -  voiding appropriately  -  DVT prophylaxis: ambulation, SCDs, Lovenox  -  breastfeeding counselling provided   -  to discuss birth control with PMD    #Acute Blood Loss Anemia  - s/p venofer  - hemoglobin stable (7.5 <-- 7.1)   - continues to be asymptomatic 
29y now P2 s/p rLTCS POD#0, hx of preeclampsia, obesity, stable    - Pain control per routine  -  encourage incentive spirometry  -  PO hydration  -  Continue diet as tolerated   -  nuñez in situ, bolus administered  -  DVT prophylaxis:  SCDs, Lovenox
28yo now P2  s/p Low transverse  section #2, POD #2, hx of preeclampsia, obesity, recovering appropriately    #Post  Section  -  Pain control per routine  -  encourage ambulation  -  encourage incentive spirometry  -  PO hydration  -  Continue diet as tolerated   -  voiding appropriately  -  DVT prophylaxis: ambulation, SCDs, Lovenox  -  breastfeeding counselling provided   -  to discuss birth control with PMD  - Anticipate d/c home today with instructions to f/u with OBGYN in 1 week for incision check and in 6 weeks for postpartum visit      #Acute Blood Loss Anemia  - s/p venofer  - hemoglobin stable (7.5 <-- 7.1)   - continues to be asymptomatic

## 2024-11-25 NOTE — DISCHARGE NOTE OB - HOSPITAL COURSE
30y/o now P2 s/p repeat CS, EBL 785cc, complicated by gHTN, obesity, h/o sPEC now on nifedipine recovering well with routine PP care.

## 2024-11-25 NOTE — PROGRESS NOTE ADULT - SUBJECTIVE AND OBJECTIVE BOX
PGY1 NOTE  Chief Complaint: s/p  section    HPI: No overnight events, no acute complaints. Pt assessed at bedside, explains she is doing well. Pain well controlled with tylenol, ibuprofen, and narcotic medication. Pt has been ambulating, voiding, passing gas, and tolerating regular diet without difficulty. Pt is breastfeeding without issue.   Denies HA, lightheadedness, palpitations, N/V, fevers, chills, CP, SOB, LE edema, heavy vaginal bleeding.   Pt would like to discuss birth control with PMD.     PAST MEDICAL & SURGICAL HISTORY:  Obesity  S/P dilation and curettage      Physical Exam  Vital Signs Last 24 Hrs  T(F): 98.1 (2024 03:22), Max: 98.1 (2024 15:25)  HR: 85 (2024 03:22) (64 - 87)  BP: 128/78 (2024 03:22) (118/81 - 145/81)  RR: 18 (2024 03:22) (18 - 18)    Physical exam:  General - AAOx3, NAD  Heart - S1S2, RRR  Lungs - CTA BL. Breathing unlabored. Speaking in clear complete sentences.   Abdomen:  - Soft, nontender, mildly distended, BS+  - Fundus firm, nontender, below the umbilicus  Incision - Clean, dry, intact abd pad covering steri-strips in place over pfannenstiel skin incision. No surrounding erythema, edema, drainage  Pelvis/Vagina - Normal rubra lochia  Extremities - No calf tenderness, no swelling bilaterally.     Labs:                        7.5    9.26  )-----------( 242      ( 2024 06:02 )             24.7                         7.1    11.77 )-----------( 207      ( 2024 16:54 )             23.1     Antibody Screen: NEG (24 @ 22:06)    acetaminophen     Tablet .. 975 milliGRAM(s) Oral <User Schedule>, Routine  diphenhydrAMINE 25 milliGRAM(s) Oral every 6 hours, Routine PRN Pruritus  diphtheria/tetanus/pertussis (acellular) Vaccine (Adacel) 0.5 milliLiter(s) IntraMuscular once, Now  enoxaparin Injectable 40 milliGRAM(s) SubCutaneous every 24 hours, 14:00  ibuprofen  Tablet. 600 milliGRAM(s) Oral every 6 hours, Routine  lactated ringers. 1000 milliLiter(s) IV Continuous <Continuous>, Routine  lactated ringers. 1000 milliLiter(s) IV Continuous <Continuous>, Routine  lanolin Ointment 1 Application(s) Topical every 6 hours, Routine PRN Sore Nipples  magnesium hydroxide Suspension 30 milliLiter(s) Oral two times a day, Routine PRN Constipation  oxyCODONE    IR 5 milliGRAM(s) Oral once, Routine PRN Moderate to Severe Pain (4-10)  oxyCODONE    IR 5 milliGRAM(s) Oral every 3 hours, Routine PRN Moderate to Severe Pain (4-10)  oxyCODONE    IR 5 milliGRAM(s) Oral once, Routine PRN Moderate to Severe Pain (4-10)  oxytocin Infusion 42 milliUNIT(s)/Min IV Continuous <Continuous>, Routine  prenatal multivitamin 1 Tablet(s) Oral daily, Routine  simethicone 80 milliGRAM(s) Chew every 4 hours, Routine

## 2024-11-25 NOTE — DISCHARGE NOTE OB - CARE PROVIDER_API CALL
Junior Lee  Obstetrics and Gynecology  [address] [place] [address]  Phone: [phone]  Fax: [phone]  Follow Up Time:

## 2024-11-25 NOTE — DISCHARGE NOTE OB - CARE PLAN
1 Principal Discharge DX:	 delivery delivered  Assessment and plan of treatment:	Nothing in the vagina for 6 weeks (no sex, no tampons, no douching). Avoid tub baths, you may shower. Avoid heavy lifting (nothing more than 10lb) for the first 4 weeks after surgery.  If you have a fever of 100.4F or greater, severe vaginal bleeding, or severe abdominal pain, call your Ob/Gyn or come to the emergency department immediately.      FOLLOW UP  - Follow up with your OB/GYN provider in 1 week for an incision check and 6 weeks for your routine post partum visit    PAIN MANAGEMENT:   Alternate Acetaminophen/Tylenol and Ibuprofen/Motrin (if you are eligible). Each of these medications can be taken every six hours. Try to stagger them so that you are taking something for pain every three hours (ex. Take Motrin at 12:00, Tylenol at 3:00, Motrin at 6:00, etc.) to maximize pain relief.  o Dosages       - Tylenol – 500-650 mg every 6 hours as needed       - Motrin/Ibuprofen - 600 mg every 6 hours as needed  o The maximum dose of Tylenol is 4000 mg in 24 hours, the maximum dose of Motrin/ibuprofen is 2400mg in 24 hours  A warm shower or heating pad may also help.    Some patient may recieve a presciption for oxycodone. If you are one of those patients, follow the below instructions  -Oxycodone is a narcotic, therefore it can be highly addictive and excessive or prolonged use should be avoided.  -Use for SEVERE PAIN only. You should continue to take Tylenol and Motrin and only supplement with oxycodone as needed for breakthrough pain.  -The maximum daily dose is 4 pills in a 24 hour period.  -You should not take oxycodone for more than 2-3 days after surgery. If you continue to have severe pain after this time requiring narcotic pain medicine, call your doctor.  Secondary Diagnosis:	Gestational hypertension  Assessment and plan of treatment:	If you have a severe headache, blurry vision, chest pain, shortness of breath, severe abdominal pain or body swelling, please call your doctor or come to the emergency room. If the top number of your blood pressure is >160 and/or the bottom number of your blood pressure is >110, then call your doctor or come to the emergency department.

## 2024-11-25 NOTE — DISCHARGE NOTE OB - PATIENT PORTAL LINK FT
You can access the FollowMyHealth Patient Portal offered by [facility] by registering at the following website: [email or web address]. By joining [email or web address] FollowMyHealth portal, you will also be able to view your health information using other applications (apps) compatible with our system.

## 2024-12-02 ENCOUNTER — APPOINTMENT (OUTPATIENT)
Dept: OBGYN | Facility: CLINIC | Age: 29
End: 2024-12-02
Payer: MEDICAID

## 2024-12-02 DIAGNOSIS — R10.2 PELVIC AND PERINEAL PAIN: ICD-10-CM

## 2024-12-02 PROCEDURE — 99213 OFFICE O/P EST LOW 20 MIN: CPT

## 2024-12-06 DIAGNOSIS — Z3A.39 39 WEEKS GESTATION OF PREGNANCY: ICD-10-CM

## 2024-12-06 DIAGNOSIS — Z87.59 PERSONAL HISTORY OF OTHER COMPLICATIONS OF PREGNANCY, CHILDBIRTH AND THE PUERPERIUM: ICD-10-CM

## 2024-12-06 DIAGNOSIS — O34.211 MATERNAL CARE FOR LOW TRANSVERSE SCAR FROM PREVIOUS CESAREAN DELIVERY: ICD-10-CM

## 2024-12-06 DIAGNOSIS — D62 ACUTE POSTHEMORRHAGIC ANEMIA: ICD-10-CM

## 2024-12-06 DIAGNOSIS — Z28.21 IMMUNIZATION NOT CARRIED OUT BECAUSE OF PATIENT REFUSAL: ICD-10-CM

## 2024-12-06 DIAGNOSIS — Z86.19 PERSONAL HISTORY OF OTHER INFECTIOUS AND PARASITIC DISEASES: ICD-10-CM

## 2024-12-06 DIAGNOSIS — Z28.09 IMMUNIZATION NOT CARRIED OUT BECAUSE OF OTHER CONTRAINDICATION: ICD-10-CM

## 2024-12-16 ENCOUNTER — APPOINTMENT (OUTPATIENT)
Dept: OBGYN | Facility: CLINIC | Age: 29
End: 2024-12-16
Payer: MEDICAID

## 2024-12-16 DIAGNOSIS — R10.2 PELVIC AND PERINEAL PAIN: ICD-10-CM

## 2024-12-16 PROCEDURE — 99213 OFFICE O/P EST LOW 20 MIN: CPT

## 2024-12-16 PROCEDURE — 0503F POSTPARTUM CARE VISIT: CPT

## 2025-01-14 ENCOUNTER — APPOINTMENT (OUTPATIENT)
Dept: OBGYN | Facility: CLINIC | Age: 30
End: 2025-01-14

## 2025-03-04 NOTE — ED ADULT NURSE NOTE - HIV OFFER
The results will be discussed with the patient during the upcoming appointment with Dr. Nelson.
Opt out

## 2025-06-30 ENCOUNTER — NON-APPOINTMENT (OUTPATIENT)
Age: 30
End: 2025-06-30

## 2025-07-03 ENCOUNTER — APPOINTMENT (OUTPATIENT)
Dept: OBGYN | Facility: CLINIC | Age: 30
End: 2025-07-03

## 2025-07-03 ENCOUNTER — LABORATORY RESULT (OUTPATIENT)
Age: 30
End: 2025-07-03

## 2025-07-03 ENCOUNTER — OUTPATIENT (OUTPATIENT)
Dept: OUTPATIENT SERVICES | Facility: HOSPITAL | Age: 30
LOS: 1 days | End: 2025-07-03
Payer: MEDICAID

## 2025-07-03 VITALS
OXYGEN SATURATION: 99 % | WEIGHT: 235.19 LBS | HEART RATE: 84 BPM | HEIGHT: 62 IN | BODY MASS INDEX: 43.28 KG/M2 | SYSTOLIC BLOOD PRESSURE: 122 MMHG | DIASTOLIC BLOOD PRESSURE: 79 MMHG

## 2025-07-03 DIAGNOSIS — Z64.0 PROBLEMS RELATED TO UNWANTED PREGNANCY: ICD-10-CM

## 2025-07-03 DIAGNOSIS — Z98.890 OTHER SPECIFIED POSTPROCEDURAL STATES: Chronic | ICD-10-CM

## 2025-07-03 PROCEDURE — 99215 OFFICE O/P EST HI 40 MIN: CPT

## 2025-07-03 PROCEDURE — 87389 HIV-1 AG W/HIV-1&-2 AB AG IA: CPT

## 2025-07-03 PROCEDURE — 86850 RBC ANTIBODY SCREEN: CPT

## 2025-07-03 PROCEDURE — 81025 URINE PREGNANCY TEST: CPT

## 2025-07-03 PROCEDURE — 87340 HEPATITIS B SURFACE AG IA: CPT

## 2025-07-03 PROCEDURE — 81513 NFCT DS BV RNA VAG FLU ALG: CPT

## 2025-07-03 PROCEDURE — 85027 COMPLETE CBC AUTOMATED: CPT

## 2025-07-03 PROCEDURE — 86701 HIV-1ANTIBODY: CPT

## 2025-07-03 PROCEDURE — 86702 HIV-2 ANTIBODY: CPT

## 2025-07-03 PROCEDURE — 87491 CHLMYD TRACH DNA AMP PROBE: CPT

## 2025-07-03 PROCEDURE — 86901 BLOOD TYPING SEROLOGIC RH(D): CPT

## 2025-07-03 PROCEDURE — 86900 BLOOD TYPING SEROLOGIC ABO: CPT

## 2025-07-03 PROCEDURE — 87481 CANDIDA DNA AMP PROBE: CPT

## 2025-07-03 PROCEDURE — 36415 COLL VENOUS BLD VENIPUNCTURE: CPT

## 2025-07-03 PROCEDURE — 86803 HEPATITIS C AB TEST: CPT

## 2025-07-03 PROCEDURE — 87661 TRICHOMONAS VAGINALIS AMPLIF: CPT

## 2025-07-03 PROCEDURE — 87591 N.GONORRHOEAE DNA AMP PROB: CPT

## 2025-07-03 PROCEDURE — 86780 TREPONEMA PALLIDUM: CPT

## 2025-07-03 PROCEDURE — 76815 OB US LIMITED FETUS(S): CPT | Mod: 26

## 2025-07-03 RX ORDER — PROMETHAZINE HYDROCHLORIDE 25 MG/1
25 TABLET ORAL
Qty: 5 | Refills: 0 | Status: ACTIVE | COMMUNITY
Start: 2025-07-03 | End: 1900-01-01

## 2025-07-03 RX ORDER — IBUPROFEN 800 MG/1
800 TABLET, FILM COATED ORAL
Qty: 15 | Refills: 3 | Status: ACTIVE | COMMUNITY
Start: 2025-07-03 | End: 1900-01-01

## 2025-07-03 RX ORDER — MISOPROSTOL 200 UG/1
200 TABLET ORAL
Qty: 4 | Refills: 0 | Status: ACTIVE | COMMUNITY
Start: 2025-07-03 | End: 1900-01-01

## 2025-07-06 LAB
ABORH: NORMAL
ANTIBODY SCREEN: NORMAL
HBV SURFACE AG SER QL: NONREACTIVE
HCG UR QL: POSITIVE
HCT VFR BLD CALC: 36 %
HCV AB SER QL: NONREACTIVE
HCV S/CO RATIO: 0.05 COI
HGB BLD-MCNC: 11.5 G/DL
MCHC RBC-ENTMCNC: 26.4 PG
MCHC RBC-ENTMCNC: 31.9 G/DL
MCV RBC AUTO: 82.6 FL
PLATELET # BLD AUTO: 223 K/UL
PMV BLD AUTO: 0 /100 WBCS
PMV BLD: 10.3 FL
QUALITY CONTROL: YES
RBC # BLD: 4.36 M/UL
RBC # FLD: 16.1 %
T PALLIDUM AB SER QL IA: NEGATIVE
WBC # FLD AUTO: 5.6 K/UL

## 2025-07-08 DIAGNOSIS — Z3A.01 LESS THAN 8 WEEKS GESTATION OF PREGNANCY: ICD-10-CM

## 2025-07-08 DIAGNOSIS — Z86.2 PERSONAL HISTORY OF DISEASES OF THE BLOOD AND BLOOD-FORMING ORGANS AND CERTAIN DISORDERS INVOLVING THE IMMUNE MECHANISM: ICD-10-CM

## 2025-07-08 DIAGNOSIS — Z11.3 ENCOUNTER FOR SCREENING FOR INFECTIONS WITH A PREDOMINANTLY SEXUAL MODE OF TRANSMISSION: ICD-10-CM

## 2025-07-11 LAB
BV BACTERIA RRNA VAG QL NAA+PROBE: DETECTED
C GLABRATA RNA VAG QL NAA+PROBE: NOT DETECTED
C TRACH RRNA SPEC QL NAA+PROBE: NOT DETECTED
CANDIDA RRNA VAG QL PROBE: NOT DETECTED
N GONORRHOEA RRNA SPEC QL NAA+PROBE: NOT DETECTED
T VAGINALIS RRNA SPEC QL NAA+PROBE: NOT DETECTED

## 2025-07-11 RX ORDER — METRONIDAZOLE 7.5 MG/G
0.75 GEL VAGINAL
Qty: 5 | Refills: 3 | Status: ACTIVE | COMMUNITY
Start: 2025-07-11 | End: 1900-01-01

## 2025-07-14 ENCOUNTER — APPOINTMENT (OUTPATIENT)
Dept: OBGYN | Facility: CLINIC | Age: 30
End: 2025-07-14